# Patient Record
Sex: MALE | Race: WHITE | Employment: FULL TIME | URBAN - METROPOLITAN AREA
[De-identification: names, ages, dates, MRNs, and addresses within clinical notes are randomized per-mention and may not be internally consistent; named-entity substitution may affect disease eponyms.]

---

## 2020-02-10 ENCOUNTER — APPOINTMENT (OUTPATIENT)
Dept: GENERAL RADIOLOGY | Age: 50
End: 2020-02-10
Payer: COMMERCIAL

## 2020-02-10 ENCOUNTER — HOSPITAL ENCOUNTER (EMERGENCY)
Age: 50
Discharge: HOME | End: 2020-02-10
Attending: EMERGENCY MEDICINE
Payer: COMMERCIAL

## 2020-02-10 VITALS
DIASTOLIC BLOOD PRESSURE: 78 MMHG | BODY MASS INDEX: 25.77 KG/M2 | TEMPERATURE: 98 F | WEIGHT: 180 LBS | HEIGHT: 70 IN | OXYGEN SATURATION: 96 % | HEART RATE: 84 BPM | SYSTOLIC BLOOD PRESSURE: 128 MMHG | RESPIRATION RATE: 21 BRPM

## 2020-02-10 LAB
ALBUMIN SERPL-MCNC: 4.1 G/DL (ref 3.4–5)
ALP BLD-CCNC: 93 U/L (ref 40–129)
ALT SERPL-CCNC: 14 U/L (ref 10–40)
ANION GAP SERPL CALCULATED.3IONS-SCNC: 13 MMOL/L (ref 3–16)
AST SERPL-CCNC: 25 U/L (ref 15–37)
BASOPHILS ABSOLUTE: 0 K/UL (ref 0–0.2)
BASOPHILS RELATIVE PERCENT: 1 %
BILIRUB SERPL-MCNC: 0.9 MG/DL (ref 0–1)
BILIRUBIN DIRECT: <0.2 MG/DL (ref 0–0.3)
BILIRUBIN, INDIRECT: NORMAL MG/DL (ref 0–1)
BUN BLDV-MCNC: 12 MG/DL (ref 7–20)
CALCIUM SERPL-MCNC: 8.8 MG/DL (ref 8.3–10.6)
CHLORIDE BLD-SCNC: 103 MMOL/L (ref 99–110)
CO2: 21 MMOL/L (ref 21–32)
CREAT SERPL-MCNC: 1.1 MG/DL (ref 0.9–1.3)
D DIMER: 218 NG/ML DDU (ref 0–229)
EKG ATRIAL RATE: 85 BPM
EKG DIAGNOSIS: NORMAL
EKG P AXIS: 77 DEGREES
EKG P-R INTERVAL: 108 MS
EKG Q-T INTERVAL: 360 MS
EKG QRS DURATION: 96 MS
EKG QTC CALCULATION (BAZETT): 428 MS
EKG R AXIS: 78 DEGREES
EKG T AXIS: 73 DEGREES
EKG VENTRICULAR RATE: 85 BPM
EOSINOPHILS ABSOLUTE: 0 K/UL (ref 0–0.6)
EOSINOPHILS RELATIVE PERCENT: 0.4 %
GFR AFRICAN AMERICAN: >60
GFR NON-AFRICAN AMERICAN: >60
GLUCOSE BLD-MCNC: 109 MG/DL (ref 70–99)
HCT VFR BLD CALC: 48.3 % (ref 40.5–52.5)
HEMATOLOGY PATH CONSULT: YES
HEMOGLOBIN: 17 G/DL (ref 13.5–17.5)
LIPASE: 19 U/L (ref 13–60)
LYMPHOCYTES ABSOLUTE: 0.8 K/UL (ref 1–5.1)
LYMPHOCYTES RELATIVE PERCENT: 44.2 %
MCH RBC QN AUTO: 31.6 PG (ref 26–34)
MCHC RBC AUTO-ENTMCNC: 35.2 G/DL (ref 31–36)
MCV RBC AUTO: 89.7 FL (ref 80–100)
MONOCYTES ABSOLUTE: 0.3 K/UL (ref 0–1.3)
MONOCYTES RELATIVE PERCENT: 17.8 %
NEUTROPHILS ABSOLUTE: 0.7 K/UL (ref 1.7–7.7)
NEUTROPHILS RELATIVE PERCENT: 36.6 %
PDW BLD-RTO: 13.1 % (ref 12.4–15.4)
PLATELET # BLD: 151 K/UL (ref 135–450)
PMV BLD AUTO: 7.5 FL (ref 5–10.5)
POTASSIUM SERPL-SCNC: 3.7 MMOL/L (ref 3.5–5.1)
PRO-BNP: 19 PG/ML (ref 0–124)
RAPID INFLUENZA  B AGN: POSITIVE
RAPID INFLUENZA A AGN: NEGATIVE
RBC # BLD: 5.38 M/UL (ref 4.2–5.9)
SLIDE REVIEW: ABNORMAL
SODIUM BLD-SCNC: 137 MMOL/L (ref 136–145)
TOTAL PROTEIN: 7.4 G/DL (ref 6.4–8.2)
TROPONIN: <0.01 NG/ML
WBC # BLD: 1.9 K/UL (ref 4–11)

## 2020-02-10 PROCEDURE — 93005 ELECTROCARDIOGRAM TRACING: CPT | Performed by: EMERGENCY MEDICINE

## 2020-02-10 PROCEDURE — 83880 ASSAY OF NATRIURETIC PEPTIDE: CPT

## 2020-02-10 PROCEDURE — 83690 ASSAY OF LIPASE: CPT

## 2020-02-10 PROCEDURE — 93010 ELECTROCARDIOGRAM REPORT: CPT | Performed by: INTERNAL MEDICINE

## 2020-02-10 PROCEDURE — 85025 COMPLETE CBC W/AUTO DIFF WBC: CPT

## 2020-02-10 PROCEDURE — 2580000003 HC RX 258: Performed by: PHYSICIAN ASSISTANT

## 2020-02-10 PROCEDURE — 84484 ASSAY OF TROPONIN QUANT: CPT

## 2020-02-10 PROCEDURE — 85379 FIBRIN DEGRADATION QUANT: CPT

## 2020-02-10 PROCEDURE — 87804 INFLUENZA ASSAY W/OPTIC: CPT

## 2020-02-10 PROCEDURE — 99285 EMERGENCY DEPT VISIT HI MDM: CPT

## 2020-02-10 PROCEDURE — 80048 BASIC METABOLIC PNL TOTAL CA: CPT

## 2020-02-10 PROCEDURE — 71046 X-RAY EXAM CHEST 2 VIEWS: CPT

## 2020-02-10 PROCEDURE — 80076 HEPATIC FUNCTION PANEL: CPT

## 2020-02-10 RX ORDER — DEXTROMETHORPHAN HYDROBROMIDE AND PROMETHAZINE HYDROCHLORIDE 15; 6.25 MG/5ML; MG/5ML
5 SYRUP ORAL 4 TIMES DAILY PRN
Qty: 100 ML | Refills: 0 | Status: SHIPPED | OUTPATIENT
Start: 2020-02-10 | End: 2020-02-17

## 2020-02-10 RX ORDER — 0.9 % SODIUM CHLORIDE 0.9 %
1000 INTRAVENOUS SOLUTION INTRAVENOUS ONCE
Status: COMPLETED | OUTPATIENT
Start: 2020-02-10 | End: 2020-02-10

## 2020-02-10 RX ORDER — OSELTAMIVIR PHOSPHATE 75 MG/1
75 CAPSULE ORAL 2 TIMES DAILY
Qty: 10 CAPSULE | Refills: 0 | Status: SHIPPED | OUTPATIENT
Start: 2020-02-10 | End: 2020-02-15

## 2020-02-10 RX ORDER — IBUPROFEN 200 MG
600 TABLET ORAL EVERY 8 HOURS PRN
Qty: 60 TABLET | Refills: 0 | Status: SHIPPED | OUTPATIENT
Start: 2020-02-10 | End: 2021-12-24

## 2020-02-10 RX ADMIN — SODIUM CHLORIDE 1000 ML: 9 INJECTION, SOLUTION INTRAVENOUS at 14:38

## 2020-02-10 ASSESSMENT — PAIN DESCRIPTION - LOCATION: LOCATION: CHEST

## 2020-02-10 ASSESSMENT — ENCOUNTER SYMPTOMS
COUGH: 0
SHORTNESS OF BREATH: 0
ABDOMINAL PAIN: 0
DIARRHEA: 0
NAUSEA: 0
VOMITING: 0
RHINORRHEA: 0

## 2020-02-10 ASSESSMENT — PAIN SCALES - GENERAL: PAINLEVEL_OUTOF10: 5

## 2020-02-10 ASSESSMENT — PAIN DESCRIPTION - PAIN TYPE: TYPE: ACUTE PAIN

## 2020-02-10 NOTE — ED NOTES
Bed: 01  Expected date:   Expected time:   Means of arrival: Walk In  Comments:     Erica Cazares RN  02/10/20 1075

## 2020-02-10 NOTE — ED PROVIDER NOTES
I independently performed a history and physical on Florentin Garcia. All diagnostic, treatment, and disposition decisions were made by myself in conjunction with the advanced practice provider. I have participated in the medical decision making and directed the treatment plan and disposition of the patient. For further details of [facility] emergency department encounter, please see the advanced practice provider's documentation. CHIEF COMPLAINT  Chief Complaint   Patient presents with    Chest Pain     pt has had fever, body aches since Thurs. cp that began yesterday, feels like he is gonna pass out     Briefly, Florentin Garcia is a 51 y.o. male  who presents to the ED complaining of fevers body aches chills and malaise. He has felt a little lightheaded. He has some chest pain with coughing. He had symptoms onset on Thursday. No history of immunosuppression. He had tactile/subjective but no objective fevers. Cough is been nonproductive. FOCUSED PHYSICAL EXAMINATION  /78   Pulse 84   Temp 98 °F (36.7 °C) (Oral)   Resp 21   Ht 5' 10\" (1.778 m)   Wt 180 lb (81.6 kg)   SpO2 96%   BMI 25.83 kg/m²    Focused physical examination notable for no acute distress, well-appearing, well-nourished, normal speech and mentation without obvious facial droop, no obvious rash. No obvious cranial nerve deficits on my initial exam.  Regular rate and rhythm. Mild scattered rhonchi, overall no wheezes or rales.     The 12 lead EKG was interpreted by me as follows:  Rate: normal with a rate of 85  Rhythm: sinus  Axis: normal  Intervals: narrow QRS, normal QTc  ST segments: no ST elevations or depressions  T waves: no abnormal inversions  Non-specific T wave changes: present  Prior EKG comparison: No prior is currently available for comparison    MDM:  Diagnostic considerations included pulmonary embolism, COPD/asthma, pneumonia, viral URI, nasal congestion, bacterial sinusitis, viral/strep pharyngitis, otitis media, otitis externa    ED course was notable for influenza B with neutropenia, which could be secondary to his infection. His vitals are otherwise reassuring and he is not hypoxic or in respiratory distress. Given the neutropenia despite being outside of the Tamiflu window we did discuss this and he will be started on this antiviral medicine as well as primary care follow-up in the next 2 days for recheck CBC and chest x-ray is clear. No evidence of bacterial superinfection. Low threshold to return to the ED for new or worsening symptoms extensively discussed considering the neutropenia. During the patient's ED course, the patient was given:  Medications   0.9 % sodium chloride bolus (0 mLs Intravenous Stopped 2/10/20 1602)        CLINICAL IMPRESSION  1. Influenza B    2. Lightheadedness    3. Neutropenia, unspecified type ([address]        DISPOSITION  Laddavi Black was discharged to home in stable condition. I have discussed the findings of today's workup with the patient and addressed the patient's questions and concerns. Important warning signs as well as new or worsening symptoms which would necessitate immediate return to the ED were discussed. The plan is to discharge from the ED at this time, and the patient is in stable condition. The patient acknowledged understanding is agreeable with this plan. Patient was given scripts for the following medications. I counseled patient how to take these medications.    New Prescriptions    IBUPROFEN (ADVIL) 200 MG TABLET    Take 3 tablets by mouth every 8 hours as needed for Pain    OSELTAMIVIR (TAMIFLU) 75 MG CAPSULE    Take 1 capsule by mouth 2 times daily for 5 days    PROMETHAZINE-DEXTROMETHORPHAN (PROMETHAZINE-DM) 6.25-15 MG/5ML SYRUP    Take 5 mLs by mouth 4 times daily as needed for Cough     Follow-up with:  Juan Diego Negrete [facility] [address] [address]  [phone]    Schedule an appointment as soon as possible for a visit in 2

## 2020-02-10 NOTE — ED NOTES
Pt alert and oriented, complaints of chest pain, chills, and body aches, heart rate regular, S1, S2 heard. Cap refill less than three seconds, radial pulse 2+, no signs of edema or JVD and lungs sounds clear. Patient had panic labs in waiting room. Patient is stable at this time. IV established. SARBJIT Reaves at bedside for orders    Family at bedside, agreeable to plan of care, denies needs at this time. Will continue to monitor.       Tonia Lanza RN  02/10/20 3954

## 2020-02-10 NOTE — ED PROVIDER NOTES
addressed in the HPI. REVIEW OF SYSTEMS    (2-9 systems for level 4, 10 or more for level 5)     Review of Systems   Constitutional: Negative for activity change, appetite change, chills and fever. HENT: Positive for congestion. Negative for rhinorrhea. Respiratory: Negative for cough and shortness of breath. Cardiovascular: Negative for chest pain. Gastrointestinal: Negative for abdominal pain, diarrhea, nausea and vomiting. Genitourinary: Negative for difficulty urinating, dysuria and hematuria. Musculoskeletal: Positive for myalgias. Neurological: Positive for light-headedness. Negative for dizziness, weakness and headaches. Positives and Pertinent negatives as per HPI. Except as noted above in the ROS, all other systems were reviewed and negative. PAST MEDICAL HISTORY   History reviewed. No pertinent past medical history. SURGICAL HISTORY   History reviewed. No pertinent surgical history. Bhupinder Khan [address]       Discharge Medication List as of 2/10/2020  4:55 PM            ALLERGIES     Iodine    FAMILYHISTORY     History reviewed. No pertinent family history. SOCIAL HISTORY       Social History     Tobacco Use    Smoking status: Former Smoker     Types: Cigarettes     Last attempt to quit: 2010     Years since quittin.5   Substance Use Topics    Alcohol use: Yes     Comment: social    Drug use: No       SCREENINGS             PHYSICAL EXAM    (up to 7 for level 4, 8 or more for level 5)     ED Triage Vitals [02/10/20 1215]   BP Temp Temp Source Pulse Resp SpO2 Height Weight   125/83 98 °F (36.7 °C) Oral 82 18 98 % 5' 10\" (1.778 m) 180 lb (81.6 kg)       Physical Exam  Vitals signs and nursing note reviewed. Constitutional:       Appearance: He is well-developed. He is not diaphoretic. HENT:      Head: Normocephalic and atraumatic.       Right Ear: External ear normal.      Left Ear: External ear normal.      Nose: Nose normal.   Eyes:      General: Right eye: No discharge. Left eye: No discharge. Neck:      Musculoskeletal: Normal range of motion and neck supple. Trachea: No tracheal deviation. Cardiovascular:      Rate and Rhythm: Normal rate and regular rhythm. Heart sounds: No murmur. Pulmonary:      Effort: Pulmonary effort is normal. No respiratory distress. Breath sounds: Normal breath sounds. No wheezing or rales. Abdominal:      General: Bowel sounds are normal. There is no distension. Tenderness: There is no abdominal tenderness. There is no guarding. Musculoskeletal: Normal range of motion. Skin:     General: Skin is warm and dry. Neurological:      Mental Status: He is alert and oriented to person, place, and time.    Psychiatric:         Behavior: Behavior normal.         DIAGNOSTIC RESULTS   LABS:    Labs Reviewed   RAPID INFLUENZA A/B ANTIGENS - Abnormal; Notable for the following components:       Result Value    Rapid Influenza B Ag POSITIVE (*)     All other components within normal limits    Narrative:     Performed at:  [facility] [address], [place], [facility] [employer]   Phone [phone]   BASIC METABOLIC PANEL - Abnormal; Notable for the following components:    Glucose 109 (*)     All other components within normal limits    Narrative:     Performed at:  [facility] [address], [place], [address] [place]   Phone [phone]   CBC WITH AUTO DIFFERENTIAL - Abnormal; Notable for the following components:    WBC 1.9 (*)     Neutrophils Absolute 0.7 (*)     Lymphocytes Absolute 0.8 (*)     All other components within normal limits    Narrative:     Alfonzo Maradiaga  [facility] tel. [phone],  Coag results called to and read back by RUTH Salamanca, 02/10/2020 12:57,  by [facility]  Performed at:  [facility] [address], [place], [address]   Phone [phone]   TROPONIN    Narrative:     Performed at:  [facility]  [address], [address]   Phone [phone]   HEPATIC FUNCTION PANEL    Narrative:     Performed at:  [facility]  [address], [address]   Phone [phone]   LIPASE    Narrative:     Performed at:  [facility]  [address],  [place], [address]   Phone [phone] PEPTIDE    Narrative:     Performed at:  [facility]  [address],  [place], [address]   Phone [phone]   D-DIMER, QUANTITATIVE    Narrative:     Performed at:  [facility] [address], [address]   Phone [phone]   URINE RT REFLEX TO CULTURE       All other labs were within normal range or not returned as of this dictation. EKG: All EKG's are interpreted by the Emergency Department Physician in the absence of a cardiologist.  Please see their note for interpretation of EKG. RADIOLOGY:   Non-plain film images such as CT, Ultrasound and MRI are read by the radiologist. Plain radiographic images are visualized and preliminarily interpreted by the ED Provider with the below findings:        Interpretation per the Radiologist below, if available at the time of this note:    XR CHEST STANDARD (2 VW)   Final Result   1. No acute abnormality. Xr Chest Standard (2 Vw)    Result Date: 2/10/2020  EXAMINATION: TWO XRAY VIEWS OF THE CHEST 2/10/2020 1:20 pm COMPARISON: 02/29/2016 HISTORY: ORDERING SYSTEM PROVIDED HISTORY: cp TECHNOLOGIST PROVIDED HISTORY: Reason for exam:->cp Reason for Exam: Chest Pain (pt has had fever, body aches since Thurs. cp that began yesterday, feels like he is gonna pass out Acuity: Acute Type of Exam: Initial FINDINGS: The lungs are clear. The cardiac silhouette is within normal limits. There is no pneumothorax or pleural effusion.      1.  No acute abnormality. PROCEDURES   Unless otherwise noted below, none     Procedures    CRITICAL CARE TIME   N/A    CONSULTS:  None      EMERGENCY DEPARTMENT COURSE and DIFFERENTIAL DIAGNOSIS/MDM:   Vitals:    Vitals:    02/10/20 1215 02/10/20 1430 02/10/20 1433   BP: 125/83  128/78   Pulse: 82 84    Resp: 18 21    Temp: 98 °F (36.7 °C)     TempSrc: Oral     SpO2: 98% 96%    Weight: 180 lb (81.6 kg)     Height: 5' 10\" (1.778 m)         Patient was given the following medications:  Medications   0.9 % sodium chloride bolus (0 mLs Intravenous Stopped 2/10/20 1602)       The patient  presents to the emergency department today for evaluation for chest pain, body aches, nasal congestion, and fevers. The patient states that he started to have the body aches, lightheadedness, congestion and tactile fevers and he states that this has been Thursday. The patient is noted to be afebrile when he arrives to the ED. The patient states that he has not really had much of a cough. The patient states that since yesterday he has been experiencing intermittent substernal chest pain, he denies any known alleviating or aggravating factors. He is rating his pain as a 5/10. He states his pain is dull. And is nonradiating. The patient denies feeling diaphoretic, dizzy, lightheaded or nauseous with the pain. The patient denies any history of hypertension, diabetes or hyperlipidemia. No family history of any cardiac events. Non-smoker. The patient denies any recent travels, immobilizations or surgeries. No history of DVT or PE. No lower leg pain or swelling. He has no abdominal pain. He has no nausea, vomiting or diarrhea. He states that overall he is feeling a little better, but continued to have symptoms,which prompts his visit to the ED     Physical exam is unremarkable. EKG documented by my attending, please see his note for further details. CBC shows a leukopenia and neutropenia which is new.   His BMP is unremarkable hepatic function panel. Lipase is normal.  Troponin negative. D-dimer negative. BNP is normal.  X-ray shows no acute process    Patient was given fluids in the ED, and is overall feeling much better. I feel that his symptoms today are likely due to the flu as he is positive for influenza B. Although he is outside the window for Tamiflu he does have neutropenia and therefore this will be prescribed. The patient is otherwise stable for discharge and I feel that he can be managed as an outpatient. He is to obtain follow-up within 2 to 3 days for reevaluation. He is to return to the ED for any new or worsening symptoms. Patient voiced understanding and is agreeable plan. Stable for discharge. My suspicion is low at this time for ACS, pneumonia, pleural effusion, pneumothorax, myocarditis, pericarditis, cardiac tamponade, life-threatening arrhythmia, TAA, acute failure, respiratory distress, acute dissection or other emergent etiology at this time. FINAL IMPRESSION      1. Influenza B    2. Lightheadedness    3.  Neutropenia, unspecified type [facility])          DISPOSITION/PLAN   DISPOSITION Decision To Discharge 02/10/2020 04:26:31 PM      PATIENT REFERREDTO:  Pratibha Ayon  [address] [facility]  [phone]    Schedule an appointment as soon as possible for a visit in 2 days      [facility] Emergency Department  [address] [facility]  [phone]    As needed, If symptoms worsen      DISCHARGE MEDICATIONS:  Discharge Medication List as of 2/10/2020  4:55 PM      START taking these medications    Details   oseltamivir (TAMIFLU) 75 MG capsule Take 1 capsule by mouth 2 times daily for 5 days, Disp-10 capsule, R-0Print      promethazine-dextromethorphan (PROMETHAZINE-DM) 6.25-15 MG/5ML syrup Take 5 mLs by mouth 4 times daily as needed for Cough, Disp-100 mL, R-0Print      ibuprofen (ADVIL) 200 MG tablet Take 3 tablets by mouth every 8 hours as needed for

## 2020-02-10 NOTE — LETTER
[facility] Emergency Department  [address] [address]  Phone: [phone]               February 10, 2020    Patient: Brianna Huber   YOB: 1970   Date of Visit: 2/10/2020       To Whom It May Concern:    Brianna Huber was seen and treated in our emergency department on 2/10/2020. He may return to work on 02/13/20.       Sincerely,       Javier Keen RN         Signature:__________________________________

## 2020-02-11 LAB — HEMATOLOGY PATH CONSULT: NORMAL

## 2021-12-24 ENCOUNTER — APPOINTMENT (OUTPATIENT)
Dept: GENERAL RADIOLOGY | Age: 51
End: 2021-12-24
Payer: COMMERCIAL

## 2021-12-24 ENCOUNTER — HOSPITAL ENCOUNTER (EMERGENCY)
Age: 51
Discharge: HOME | End: 2021-12-24
Payer: COMMERCIAL

## 2021-12-24 VITALS
TEMPERATURE: 97.2 F | BODY MASS INDEX: 25.56 KG/M2 | HEIGHT: 70 IN | HEART RATE: 62 BPM | WEIGHT: 178.5 LBS | SYSTOLIC BLOOD PRESSURE: 118 MMHG | OXYGEN SATURATION: 97 % | RESPIRATION RATE: 16 BRPM | DIASTOLIC BLOOD PRESSURE: 82 MMHG

## 2021-12-24 DIAGNOSIS — S62.632B OPEN DISPLACED FRACTURE OF DISTAL PHALANX OF RIGHT MIDDLE FINGER, INITIAL ENCOUNTER: Primary | ICD-10-CM

## 2021-12-24 PROCEDURE — 6360000002 HC RX W HCPCS: Performed by: PHYSICIAN ASSISTANT

## 2021-12-24 PROCEDURE — 73140 X-RAY EXAM OF FINGER(S): CPT

## 2021-12-24 PROCEDURE — 96372 THER/PROPH/DIAG INJ SC/IM: CPT

## 2021-12-24 PROCEDURE — 90715 TDAP VACCINE 7 YRS/> IM: CPT

## 2021-12-24 PROCEDURE — 12001 RPR S/N/AX/GEN/TRNK 2.5CM/<: CPT

## 2021-12-24 PROCEDURE — 90471 IMMUNIZATION ADMIN: CPT

## 2021-12-24 PROCEDURE — 6360000002 HC RX W HCPCS

## 2021-12-24 PROCEDURE — 99283 EMERGENCY DEPT VISIT LOW MDM: CPT

## 2021-12-24 RX ORDER — IBUPROFEN 800 MG/1
800 TABLET ORAL EVERY 8 HOURS PRN
Qty: 20 TABLET | Refills: 0 | Status: SHIPPED | OUTPATIENT
Start: 2021-12-24 | End: 2022-01-03

## 2021-12-24 RX ORDER — HYDROCODONE BITARTRATE AND ACETAMINOPHEN 5; 325 MG/1; MG/1
1 TABLET ORAL EVERY 6 HOURS PRN
Qty: 10 TABLET | Refills: 0 | Status: SHIPPED | OUTPATIENT
Start: 2021-12-24 | End: 2021-12-27

## 2021-12-24 RX ORDER — CEFAZOLIN SODIUM 1 G/3ML
1000 INJECTION, POWDER, FOR SOLUTION INTRAMUSCULAR; INTRAVENOUS ONCE
Status: COMPLETED | OUTPATIENT
Start: 2021-12-24 | End: 2021-12-24

## 2021-12-24 RX ORDER — CEPHALEXIN 500 MG/1
500 CAPSULE ORAL 4 TIMES DAILY
Qty: 28 CAPSULE | Refills: 0 | Status: SHIPPED | OUTPATIENT
Start: 2021-12-24 | End: 2021-12-31

## 2021-12-24 RX ADMIN — TETANUS TOXOID, REDUCED DIPHTHERIA TOXOID AND ACELLULAR PERTUSSIS VACCINE, ADSORBED 0.5 ML: 5; 2.5; 8; 8; 2.5 SUSPENSION INTRAMUSCULAR at 08:58

## 2021-12-24 RX ADMIN — CEFAZOLIN SODIUM 1000 MG: 1 INJECTION, POWDER, FOR SOLUTION INTRAMUSCULAR; INTRAVENOUS at 08:58

## 2021-12-24 ASSESSMENT — PAIN SCALES - GENERAL: PAINLEVEL_OUTOF10: 5

## 2021-12-24 NOTE — ED PROVIDER NOTES
**ADVANCED PRACTICE PROVIDER, I HAVE EVALUATED THIS PATIENT**        [address] ENCOUNTER      Pt Name: Zach Mail  RNQ:[id number]  Nando 1970  Date of evaluation: 12/24/2021  Provider: Meagan Khan PA-C      Chief Complaint:    Chief Complaint   Patient presents with    Trauma     to right 3rd finger in car door at 0200         Nursing Notes, Past Medical Hx, Past Surgical Hx, Social Hx, Allergies, and Family Hx were all reviewed and agreed with or any disagreements were addressed in the HPI.    HPI: (Location, Duration, Timing, Severity, Quality, Assoc Sx, Context, Modifying factors)    Chief Complaint of third finger pain    This is a  49 y.o. male who presents to the emergency department, patient states that it was a door that came down onto the top of his finger, and he could not get his finger out of the way and he smashed it. This happened at 2:00 in the morning. He admits to pain and disfigurement. He wrapped the area up, he states his pain level is 5/10, less than last night. States his tetanus is not up-to-date. Denies any other injury to his hand. PastMedical/Surgical History:  History reviewed. No pertinent past medical history. History reviewed. No pertinent surgical history. Medications:  Discharge Medication List as of 12/24/2021  8:59 AM            Review of Systems:  (2-9 systems needed)  Review of Systems    \"Positives and Pertinent negatives as per HPI\"    Physical Exam:  Physical Exam  Vitals and nursing note reviewed. Constitutional:       Appearance: Normal appearance. He is well-developed. He is not ill-appearing or diaphoretic. HENT:      Head: Normocephalic and atraumatic. Right Ear: External ear normal.      Left Ear: External ear normal.      Nose: Nose normal.   Eyes:      General:         Right eye: No discharge. Left eye: No discharge.    Pulmonary:      Effort: Pulmonary effort is normal. No respiratory distress. Breath sounds: No stridor. Musculoskeletal:      Left hand: Swelling, deformity, laceration, tenderness and bony tenderness present. Decreased range of motion. Hands:       Cervical back: Normal range of motion and neck supple. Skin:     General: Skin is warm and dry. Coloration: Skin is not pale. Neurological:      General: No focal deficit present. Mental Status: He is alert and oriented to person, place, and time. Psychiatric:         Mood and Affect: Mood normal.         Behavior: Behavior normal.         MEDICAL DECISION MAKING    Vitals:    Vitals:    12/24/21 0738   BP: 118/82   Pulse: 62   Resp: 16   Temp: 97.2 °F (36.2 °C)   TempSrc: Temporal   SpO2: 97%   Weight: 178 lb 8 oz (81 kg)   Height: 5' 10\" (1.778 m)       LABS:Labs Reviewed - No data to display     Remainder of labs reviewed and were negative at this time or not returned at the time of this note. RADIOLOGY:   Non-plain film images such as CT, Ultrasound and MRI are read by the radiologist. Libra Tafoya PA-C have directly visualized the radiologic plain film image(s) with the below findings:      Interpretation per the Radiologist below, if available at the time of this note:    XR FINGER RIGHT (MIN 2 VIEWS)   Final Result   Comminuted distal 3rd phalangeal tuft fracture with dorsal displacement and   overlying soft tissue injury. XR FINGER RIGHT (MIN 2 VIEWS)    Result Date: 12/24/2021  EXAMINATION: THREE XRAY VIEWS OF THE RIGHT FINGERS 12/24/2021 7:42 am COMPARISON: None. HISTORY: ORDERING SYSTEM PROVIDED HISTORY: injury. 3rd finger TECHNOLOGIST PROVIDED HISTORY: Reason for exam:->injury. 3rd finger Reason for Exam: Trauma (to right 3rd finger in car door at 0200) FINDINGS: Crush injury of the 3rd distal phalanx through the tuft is evident with soft tissue injury and surrounding bandage.   There is comminuted fracture involving the tuft extending through the base of the tuft primarily. No additional fracture. No dislocation. On the lateral projection, tuft fragment is displaced dorsally. Comminuted distal 3rd phalangeal tuft fracture with dorsal displacement and overlying soft tissue injury. MEDICAL DECISION MAKING / ED COURSE:      PROCEDURES:   Lac Repair    Date/Time: 12/24/2021 5:51 PM  Performed by: Isabell Recio PA-C  Authorized by: Isabell Recio PA-C     Consent:     Consent obtained:  Verbal    Consent given by:  Patient    Risks discussed:  Pain  Anesthesia (see MAR for exact dosages): Anesthesia method:  Nerve block    Block needle gauge:  27 G    Block anesthetic:  Bupivacaine 0.5% w/o epi    Block injection procedure:  Anatomic landmarks identified, introduced needle, incremental injection and anatomic landmarks palpated    Block outcome:  Anesthesia achieved  Laceration details:     Location:  Finger    Finger location:  R long finger    Length (cm):  2    Depth (mm):  1  Repair type:     Repair type:   Intermediate  Pre-procedure details:     Preparation:  Patient was prepped and draped in usual sterile fashion  Exploration:     Hemostasis achieved with:  Direct pressure    Wound exploration: entire depth of wound probed and visualized      Wound extent: no fascia violation noted, no foreign bodies/material noted, no muscle damage noted, no nerve damage noted, no tendon damage noted and no vascular damage noted    Treatment:     Area cleansed with:  Saline and Hibiclens    Amount of cleaning:  Standard    Irrigation solution:  Sterile saline    Irrigation volume:  1000    Irrigation method:  Tap    Visualized foreign bodies/material removed: no    Skin repair:     Repair method:  Sutures    Suture size:  4-0    Suture material:  Nylon    Suture technique:  Simple interrupted    Number of sutures:  2  Approximation:     Approximation:  Close  Post-procedure details:     Dressing:  Bulky dressing, splint for protection, non-adherent dressing and sterile dressing    Patient tolerance of procedure: Tolerated well, no immediate complications  Comments:      Fingernail was tucked back in an stone. None    Patient was given:  Medications   Tetanus-Diphth-Acell Pertussis (BOOSTRIX) injection 0.5 mL (0.5 mLs IntraMUSCular Given 12/24/21 0858)   ceFAZolin (ANCEF) injection 1,000 mg (1,000 mg IntraMUSCular Given 12/24/21 0858)       This is an open fracture, antibiotics and tetanus were given, patient will be given the name of the hand surgeon. The patient tolerated their visit well. I evaluated the patient. The physician was available for consultation as needed. The patient and / or the family were informed of the results of any tests, a time was given to answer questions, a plan was proposed and they agreed with plan. CLINICAL IMPRESSION:  1. Open displaced fracture of distal phalanx of right middle finger, initial encounter        DISPOSITION Decision To Discharge 12/24/2021 08:51:43 AM      PATIENT REFERRED TO:  Nilton Simon MD  [address] [place] [address], [address] [place] [address],[address]  [address] [place] [address]  [phone]    Call today  to be seen by this MD or one of their partners, For a recheck in 5-7 days      DISCHARGE MEDICATIONS:  Discharge Medication List as of 12/24/2021  8:59 AM      START taking these medications    Details   HYDROcodone-acetaminophen (NORCO) 5-325 MG per tablet Take 1 tablet by mouth every 6 hours as needed for Pain for up to 3 days. , Disp-10 tablet, R-0Print      cephALEXin (KEFLEX) 500 MG capsule Take 1 capsule by mouth 4 times daily for 7 days, Disp-28 capsule, R-0Print             DISCONTINUED MEDICATIONS:  Discharge Medication List as of 12/24/2021  8:59 AM                 (Please note the MDM and HPI sections of this note were completed with a voice recognition program.  Efforts were made to edit the dictations but occasionally words are mis-transcribed.)    Electronically signed, Abhi Ledezma PA-C,          Tonia Lemnos Yamila Doyle, [place]  12/24/21 4017

## 2021-12-27 ENCOUNTER — TELEPHONE (OUTPATIENT)
Dept: ORTHOPEDIC SURGERY | Age: 51
End: 2021-12-27

## 2022-01-07 ENCOUNTER — OFFICE VISIT (OUTPATIENT)
Dept: ORTHOPEDIC SURGERY | Age: 52
End: 2022-01-07
Payer: COMMERCIAL

## 2022-01-07 VITALS — BODY MASS INDEX: 25.48 KG/M2 | RESPIRATION RATE: 15 BRPM | HEIGHT: 70 IN | WEIGHT: 178 LBS

## 2022-01-07 DIAGNOSIS — S69.91XA INJURY OF TIP OF FINGER OF RIGHT HAND, INITIAL ENCOUNTER: Primary | ICD-10-CM

## 2022-01-07 PROCEDURE — G8484 FLU IMMUNIZE NO ADMIN: HCPCS | Performed by: ORTHOPAEDIC SURGERY

## 2022-01-07 PROCEDURE — G8419 CALC BMI OUT NRM PARAM NOF/U: HCPCS | Performed by: ORTHOPAEDIC SURGERY

## 2022-01-07 PROCEDURE — 99204 OFFICE O/P NEW MOD 45 MIN: CPT | Performed by: ORTHOPAEDIC SURGERY

## 2022-01-07 PROCEDURE — 26750 TREAT FINGER FRACTURE EACH: CPT | Performed by: ORTHOPAEDIC SURGERY

## 2022-01-07 PROCEDURE — G8427 DOCREV CUR MEDS BY ELIG CLIN: HCPCS | Performed by: ORTHOPAEDIC SURGERY

## 2022-01-07 RX ORDER — HYDROCODONE BITARTRATE AND ACETAMINOPHEN 5; 325 MG/1; MG/1
1 TABLET ORAL EVERY 6 HOURS PRN
Qty: 10 TABLET | Refills: 0 | Status: SHIPPED | OUTPATIENT
Start: 2022-01-07 | End: 2022-01-14

## 2022-01-07 NOTE — Clinical Note
Dear  Parth Bender,    Thank you very much for your referral or . Jacob Hendricks to me for evaluation and treatment of his Hand & Wrist condition. I appreciate your confidence in me and thank you for allowing me the opportunity to care for your patients. If I can be of any further assistance to you on this or any other patient, please do not hesitate to contact me. Sincerely,    Silvio Nava.  Stewart Wright MD

## 2022-01-07 NOTE — PROGRESS NOTES
Mr. Luh Carlin is a 49 y.o. right handed man  who is seen today in Hand Surgical Consultation at the request of Armando Davis. He is seen today regarding an injury occurring on December 24th, 2021. He reports injuring his right Middle Finger, having slammed it in a door . At the time of injury, there was clear dislocation or malposition of the finger. He was seen for Emergency evaluation elsewhere, radiographs were obtained & he has been immobilized. By report, his skin injury was repaired after the wound was thoroughly cleansed. The nail structures were involved in the injury and did require repair at the time. He reports moderate pain located in the Distal aspect of the Middle Finger, no tenderness of the wrist or elbow. He notes today, moderate neurologic symptoms in the Middle Finger tip. Symptoms show no change over time. I have today reviewed with Luh Carlin the clinically relevant, past medical history, medications, allergies,  family history, social history, and Review Of Systems & I have documented any details relevant to today's presenting complaints in my history above. Mr. Perry Khan self-reported past medical history, medications, allergies,  family history, social history, and Review Of Systems have been scanned into the chart under the \"Media\" tab. Physical Exam:  Mr. Perry Khan most recent vitals:  Vitals  Resp: 15  Height: 5' 10\" (177.8 cm)  Weight: 178 lb (80.7 kg)    He is well nourished, oriented to person, place & time. He demonstrates appropriate mood and affect as well as normal gait and station. Skin: There is Crescentic 1.5cm laceration on the Dorsal right Middle Finger closest to the paronychial margin which has been sutured. The nail plate is present. No other digits show sign of skin injury bilaterally. Digital range of motion is limited by pain in the injured digit on the Right, normal on the Left.  FDS function is Intact, FDP function is Intact, common extensor function is Intact. Wrist range of motion is without significant limitation bilaterally. Sensation is subjectively decreased in the zone of injury, objectively present. All other digits demonstrate normal sensation bilaterally. Vascular examination reveals normal and good capillary refill bilaterally. There is moderate acute ecchymosis. Swelling is moderate in the Middle Finger, all other digits demonstrate no evidence of swelling bilaterally. There is no evidence of gross joint instability bilaterally. Muscular strength is clinically appropriate bilaterally. Maximal pain is elicited with palpation of the distal region of the Middle Finger about the Distal Phalanx . The base of the hand & wrist are not tender to palpation. There is a 10 degree angular deformity and no clinical evidence of  mal-rotation of the injured digit. Radiographic Evaluation:  Radiographs, taken From a Hospital location outside of my practice were Personally Reviewed & Interpreted by myself today (2 views of the right Middle Finger). They do demonstrate evidence of an acute fracture of the Distal Phalanx of the Middle Finger. There is moderate comminution, 10 degrees of angular deformity and no  mal-rotation. The distal fragment is dorsally displaced. There is not evidence of other injury or bony fracture. Impression:  Mr. Phillip Hinds has sustained recent right Middle Finger injury with associated fracture. He  presents requesting further treatment. Plan:    I  have discussed with Mr. Phillip Hinds the various treatment options for treatment of his right Middle Finger tip injury.   We discussed the options of Conservative management allowing the soft tissues and bony structures to heal as well as possible and the functional consequences thereof, and Surgical Management in the form of Irrigation and debridement of the wound and associated injuries, and the repair of injured structures as possible (including skin, bone, nail structures, etc.). He has elected to proceed conservatively, voicing an understanding of the other options available to him. I have explained the complications, limitations, expectations, alternatives, & risks of his chosen treatment. We discussed the possibility of residual symptoms as may be related to conservative treatment of fractures including the possibilities of: infection, poor healing of bone, skin, nail bed, and other structures, persistant deformity, persistant pain, persistent sensory loss, limitation of motion, future arthritic symptoms & the possible need for further treatment. He understood our discussion and was comfortable with his decision; he was provided with appropriate expectations. He is today fitted with a carefully applied, well padded & appropriately molded STACK SPLINT. He was given a Patient Instruction Sheet related to cast care. I have asked him to schedule a follow-up appointment for 3 weeks from now at which time we will obtain repeat radiographs of the injured finger out of splint/cast.    He is specifically instructed to contact the office between now & his scheduled appointment if he has concerns related to the underlying injury. He is welcome to call for an appointment sooner if he has any additional concerns or questions.

## 2023-09-05 ENCOUNTER — HOSPITAL ENCOUNTER (INPATIENT)
Age: 53
LOS: 2 days | Discharge: HOME | End: 2023-09-07
Attending: EMERGENCY MEDICINE | Admitting: STUDENT IN AN ORGANIZED HEALTH CARE EDUCATION/TRAINING PROGRAM
Payer: COMMERCIAL

## 2023-09-05 ENCOUNTER — APPOINTMENT (OUTPATIENT)
Dept: GENERAL RADIOLOGY | Age: 53
End: 2023-09-05
Payer: COMMERCIAL

## 2023-09-05 ENCOUNTER — APPOINTMENT (OUTPATIENT)
Dept: CT IMAGING | Age: 53
End: 2023-09-05
Payer: COMMERCIAL

## 2023-09-05 ENCOUNTER — APPOINTMENT (OUTPATIENT)
Dept: MRI IMAGING | Age: 53
End: 2023-09-05
Payer: COMMERCIAL

## 2023-09-05 DIAGNOSIS — E87.20 LACTIC ACIDOSIS: ICD-10-CM

## 2023-09-05 DIAGNOSIS — R56.9 SEIZURE (HCC): ICD-10-CM

## 2023-09-05 DIAGNOSIS — R41.82 ALTERED MENTAL STATUS, UNSPECIFIED ALTERED MENTAL STATUS TYPE: Primary | ICD-10-CM

## 2023-09-05 PROBLEM — N17.9 AKI (ACUTE KIDNEY INJURY) (HCC): Status: ACTIVE | Noted: 2023-09-05

## 2023-09-05 PROBLEM — G93.41 ACUTE METABOLIC ENCEPHALOPATHY: Status: ACTIVE | Noted: 2023-09-05

## 2023-09-05 LAB
ALBUMIN SERPL-MCNC: 4.1 G/DL (ref 3.4–5)
ALBUMIN SERPL-MCNC: 4.7 G/DL (ref 3.4–5)
ALBUMIN/GLOB SERPL: 1.4 {RATIO} (ref 1.1–2.2)
ALBUMIN/GLOB SERPL: 1.5 {RATIO} (ref 1.1–2.2)
ALP SERPL-CCNC: 102 U/L (ref 40–129)
ALP SERPL-CCNC: 115 U/L (ref 40–129)
ALT SERPL-CCNC: 14 U/L (ref 10–40)
ALT SERPL-CCNC: 20 U/L (ref 10–40)
AMMONIA PLAS-SCNC: 33 UMOL/L (ref 16–60)
AMPHETAMINES UR QL SCN>1000 NG/ML: ABNORMAL
ANION GAP SERPL CALCULATED.3IONS-SCNC: 12 MMOL/L (ref 3–16)
ANION GAP SERPL CALCULATED.3IONS-SCNC: 25 MMOL/L (ref 3–16)
AST SERPL-CCNC: 18 U/L (ref 15–37)
AST SERPL-CCNC: 25 U/L (ref 15–37)
BACTERIA URNS QL MICRO: NORMAL /HPF
BARBITURATES UR QL SCN>200 NG/ML: ABNORMAL
BASE EXCESS BLDV CALC-SCNC: -10.6 MMOL/L (ref -3–3)
BASE EXCESS BLDV CALC-SCNC: -4.3 MMOL/L (ref -3–3)
BASOPHILS # BLD: 0.1 K/UL (ref 0–0.2)
BASOPHILS NFR BLD: 0.9 %
BENZODIAZ UR QL SCN>200 NG/ML: ABNORMAL
BILIRUB SERPL-MCNC: 1.2 MG/DL (ref 0–1)
BILIRUB SERPL-MCNC: 1.6 MG/DL (ref 0–1)
BILIRUB UR QL STRIP.AUTO: NEGATIVE
BUN SERPL-MCNC: 11 MG/DL (ref 7–20)
BUN SERPL-MCNC: 9 MG/DL (ref 7–20)
CALCIUM SERPL-MCNC: 9.2 MG/DL (ref 8.3–10.6)
CALCIUM SERPL-MCNC: 9.8 MG/DL (ref 8.3–10.6)
CANNABINOIDS UR QL SCN>50 NG/ML: POSITIVE
CHLORIDE SERPL-SCNC: 107 MMOL/L (ref 99–110)
CHLORIDE SERPL-SCNC: 99 MMOL/L (ref 99–110)
CLARITY UR: CLEAR
CO2 BLDV-SCNC: 34 MMOL/L
CO2 BLDV-SCNC: 52 MMOL/L
CO2 SERPL-SCNC: 12 MMOL/L (ref 21–32)
CO2 SERPL-SCNC: 19 MMOL/L (ref 21–32)
COCAINE UR QL SCN: ABNORMAL
COHGB MFR BLDV: 3.3 % (ref 0–1.5)
COHGB MFR BLDV: 3.5 % (ref 0–1.5)
COLOR UR: YELLOW
CREAT SERPL-MCNC: 1 MG/DL (ref 0.9–1.3)
CREAT SERPL-MCNC: 1.4 MG/DL (ref 0.9–1.3)
DEPRECATED RDW RBC AUTO: 12.9 % (ref 12.4–15.4)
DO-HGB MFR BLDV: 6 %
DRUG SCREEN COMMENT UR-IMP: ABNORMAL
EKG ATRIAL RATE: 82 BPM
EKG DIAGNOSIS: NORMAL
EKG P AXIS: 68 DEGREES
EKG P-R INTERVAL: 118 MS
EKG Q-T INTERVAL: 412 MS
EKG QRS DURATION: 102 MS
EKG QTC CALCULATION (BAZETT): 481 MS
EKG R AXIS: 75 DEGREES
EKG T AXIS: 34 DEGREES
EKG VENTRICULAR RATE: 82 BPM
EOSINOPHIL # BLD: 0.3 K/UL (ref 0–0.6)
EOSINOPHIL NFR BLD: 3.3 %
EPI CELLS #/AREA URNS AUTO: 0 /HPF (ref 0–5)
ETHANOLAMINE SERPL-MCNC: NORMAL MG/DL (ref 0–0.08)
FENTANYL SCREEN, URINE: ABNORMAL
FOLATE SERPL-MCNC: 5.41 NG/ML (ref 4.78–24.2)
GFR SERPLBLD CREATININE-BSD FMLA CKD-EPI: >60 ML/MIN/{1.73_M2}
GFR SERPLBLD CREATININE-BSD FMLA CKD-EPI: >60 ML/MIN/{1.73_M2}
GLUCOSE SERPL-MCNC: 130 MG/DL (ref 70–99)
GLUCOSE SERPL-MCNC: 160 MG/DL (ref 70–99)
GLUCOSE UR STRIP.AUTO-MCNC: NEGATIVE MG/DL
HCO3 BLDV-SCNC: 14.3 MMOL/L (ref 23–29)
HCO3 BLDV-SCNC: 21.9 MMOL/L (ref 23–29)
HCT VFR BLD AUTO: 48.8 % (ref 40.5–52.5)
HGB BLD-MCNC: 16.6 G/DL (ref 13.5–17.5)
HGB UR QL STRIP.AUTO: ABNORMAL
HYALINE CASTS #/AREA URNS AUTO: 1 /LPF (ref 0–8)
KETONES UR STRIP.AUTO-MCNC: ABNORMAL MG/DL
LACTATE BLDV-SCNC: 0.9 MMOL/L (ref 0.4–2)
LACTATE BLDV-SCNC: 13 MMOL/L (ref 0.4–2)
LACTATE BLDV-SCNC: 2.2 MMOL/L (ref 0.4–2)
LEUKOCYTE ESTERASE UR QL STRIP.AUTO: NEGATIVE
LYMPHOCYTES # BLD: 3.7 K/UL (ref 1–5.1)
LYMPHOCYTES NFR BLD: 38.8 %
MCH RBC QN AUTO: 31.5 PG (ref 26–34)
MCHC RBC AUTO-ENTMCNC: 34 G/DL (ref 31–36)
MCV RBC AUTO: 92.8 FL (ref 80–100)
METHADONE UR QL SCN>300 NG/ML: ABNORMAL
METHGB MFR BLDV: 0.2 %
METHGB MFR BLDV: 0.4 %
MONOCYTES # BLD: 1 K/UL (ref 0–1.3)
MONOCYTES NFR BLD: 10 %
NEUTROPHILS # BLD: 4.5 K/UL (ref 1.7–7.7)
NEUTROPHILS NFR BLD: 47 %
NITRITE UR QL STRIP.AUTO: NEGATIVE
O2 CT VFR BLDV CALC: 20 VOL %
O2 CT VFR BLDV CALC: 23 VOL %
O2 THERAPY: ABNORMAL
O2 THERAPY: ABNORMAL
OPIATES UR QL SCN>300 NG/ML: ABNORMAL
OXYCODONE UR QL SCN: ABNORMAL
PCO2 BLDV: 29.4 MMHG (ref 40–50)
PCO2 BLDV: 43.4 MMHG (ref 40–50)
PCP UR QL SCN>25 NG/ML: ABNORMAL
PH BLDV: 7.3 [PH] (ref 7.35–7.45)
PH BLDV: 7.31 [PH] (ref 7.35–7.45)
PH UR STRIP.AUTO: 5.5 [PH] (ref 5–8)
PH UR STRIP: 5.5 [PH]
PLATELET # BLD AUTO: 368 K/UL (ref 135–450)
PLATELET BLD QL SMEAR: ADEQUATE
PMV BLD AUTO: 7.3 FL (ref 5–10.5)
PO2 BLDV: 166 MMHG (ref 25–40)
PO2 BLDV: 70.9 MMHG (ref 25–40)
POTASSIUM SERPL-SCNC: 3.8 MMOL/L (ref 3.5–5.1)
POTASSIUM SERPL-SCNC: 3.9 MMOL/L (ref 3.5–5.1)
PROT SERPL-MCNC: 7 G/DL (ref 6.4–8.2)
PROT SERPL-MCNC: 7.8 G/DL (ref 6.4–8.2)
PROT UR STRIP.AUTO-MCNC: ABNORMAL MG/DL
RBC # BLD AUTO: 5.26 M/UL (ref 4.2–5.9)
RBC CLUMPS #/AREA URNS AUTO: 0 /HPF (ref 0–4)
SAO2 % BLDV: 100 %
SAO2 % BLDV: 94 %
SLIDE REVIEW: NORMAL
SODIUM SERPL-SCNC: 136 MMOL/L (ref 136–145)
SODIUM SERPL-SCNC: 138 MMOL/L (ref 136–145)
SP GR UR STRIP.AUTO: 1.01 (ref 1–1.03)
TSH SERPL DL<=0.005 MIU/L-ACNC: 0.62 UIU/ML (ref 0.27–4.2)
UA COMPLETE W REFLEX CULTURE PNL UR: ABNORMAL
UA DIPSTICK W REFLEX MICRO PNL UR: YES
URN SPEC COLLECT METH UR: ABNORMAL
UROBILINOGEN UR STRIP-ACNC: 0.2 E.U./DL
VIT B12 SERPL-MCNC: 624 PG/ML (ref 211–911)
WBC # BLD AUTO: 9.6 K/UL (ref 4–11)
WBC #/AREA URNS AUTO: 0 /HPF (ref 0–5)

## 2023-09-05 PROCEDURE — 71045 X-RAY EXAM CHEST 1 VIEW: CPT

## 2023-09-05 PROCEDURE — 6370000000 HC RX 637 (ALT 250 FOR IP): Performed by: EMERGENCY MEDICINE

## 2023-09-05 PROCEDURE — 70450 CT HEAD/BRAIN W/O DYE: CPT

## 2023-09-05 PROCEDURE — 95819 EEG AWAKE AND ASLEEP: CPT

## 2023-09-05 PROCEDURE — 96374 THER/PROPH/DIAG INJ IV PUSH: CPT

## 2023-09-05 PROCEDURE — 2580000003 HC RX 258: Performed by: EMERGENCY MEDICINE

## 2023-09-05 PROCEDURE — 2580000003 HC RX 258: Performed by: STUDENT IN AN ORGANIZED HEALTH CARE EDUCATION/TRAINING PROGRAM

## 2023-09-05 PROCEDURE — 6370000000 HC RX 637 (ALT 250 FOR IP): Performed by: STUDENT IN AN ORGANIZED HEALTH CARE EDUCATION/TRAINING PROGRAM

## 2023-09-05 PROCEDURE — 93010 ELECTROCARDIOGRAM REPORT: CPT | Performed by: INTERNAL MEDICINE

## 2023-09-05 PROCEDURE — 82607 VITAMIN B-12: CPT

## 2023-09-05 PROCEDURE — 80053 COMPREHEN METABOLIC PANEL: CPT

## 2023-09-05 PROCEDURE — 92610 EVALUATE SWALLOWING FUNCTION: CPT

## 2023-09-05 PROCEDURE — 82803 BLOOD GASES ANY COMBINATION: CPT

## 2023-09-05 PROCEDURE — 96361 HYDRATE IV INFUSION ADD-ON: CPT

## 2023-09-05 PROCEDURE — 99223 1ST HOSP IP/OBS HIGH 75: CPT | Performed by: PSYCHIATRY & NEUROLOGY

## 2023-09-05 PROCEDURE — 83036 HEMOGLOBIN GLYCOSYLATED A1C: CPT

## 2023-09-05 PROCEDURE — APPSS45 APP SPLIT SHARED TIME 31-45 MINUTES

## 2023-09-05 PROCEDURE — 80307 DRUG TEST PRSMV CHEM ANLYZR: CPT

## 2023-09-05 PROCEDURE — 82140 ASSAY OF AMMONIA: CPT

## 2023-09-05 PROCEDURE — 92526 ORAL FUNCTION THERAPY: CPT

## 2023-09-05 PROCEDURE — 99285 EMERGENCY DEPT VISIT HI MDM: CPT

## 2023-09-05 PROCEDURE — 2060000000 HC ICU INTERMEDIATE R&B

## 2023-09-05 PROCEDURE — 93005 ELECTROCARDIOGRAM TRACING: CPT | Performed by: EMERGENCY MEDICINE

## 2023-09-05 PROCEDURE — 82077 ASSAY SPEC XCP UR&BREATH IA: CPT

## 2023-09-05 PROCEDURE — 83605 ASSAY OF LACTIC ACID: CPT

## 2023-09-05 PROCEDURE — 36415 COLL VENOUS BLD VENIPUNCTURE: CPT

## 2023-09-05 PROCEDURE — 85025 COMPLETE CBC W/AUTO DIFF WBC: CPT

## 2023-09-05 PROCEDURE — 1200000000 HC SEMI PRIVATE

## 2023-09-05 PROCEDURE — 6360000002 HC RX W HCPCS: Performed by: EMERGENCY MEDICINE

## 2023-09-05 PROCEDURE — APPNB30 APP NON BILLABLE TIME 0-30 MINS

## 2023-09-05 PROCEDURE — 82746 ASSAY OF FOLIC ACID SERUM: CPT

## 2023-09-05 PROCEDURE — 84443 ASSAY THYROID STIM HORMONE: CPT

## 2023-09-05 PROCEDURE — 81001 URINALYSIS AUTO W/SCOPE: CPT

## 2023-09-05 PROCEDURE — 6360000002 HC RX W HCPCS: Performed by: STUDENT IN AN ORGANIZED HEALTH CARE EDUCATION/TRAINING PROGRAM

## 2023-09-05 RX ORDER — SODIUM CHLORIDE 9 MG/ML
INJECTION, SOLUTION INTRAVENOUS PRN
Status: DISCONTINUED | OUTPATIENT
Start: 2023-09-05 | End: 2023-09-07 | Stop reason: HOSPADM

## 2023-09-05 RX ORDER — SODIUM CHLORIDE, SODIUM LACTATE, POTASSIUM CHLORIDE, CALCIUM CHLORIDE 600; 310; 30; 20 MG/100ML; MG/100ML; MG/100ML; MG/100ML
INJECTION, SOLUTION INTRAVENOUS CONTINUOUS
Status: DISCONTINUED | OUTPATIENT
Start: 2023-09-05 | End: 2023-09-06

## 2023-09-05 RX ORDER — 0.9 % SODIUM CHLORIDE 0.9 %
1000 INTRAVENOUS SOLUTION INTRAVENOUS ONCE
Status: COMPLETED | OUTPATIENT
Start: 2023-09-05 | End: 2023-09-05

## 2023-09-05 RX ORDER — SODIUM CHLORIDE 0.9 % (FLUSH) 0.9 %
5-40 SYRINGE (ML) INJECTION EVERY 12 HOURS SCHEDULED
Status: DISCONTINUED | OUTPATIENT
Start: 2023-09-05 | End: 2023-09-07 | Stop reason: HOSPADM

## 2023-09-05 RX ORDER — POTASSIUM CHLORIDE 20 MEQ/1
40 TABLET, EXTENDED RELEASE ORAL PRN
Status: DISCONTINUED | OUTPATIENT
Start: 2023-09-05 | End: 2023-09-07 | Stop reason: HOSPADM

## 2023-09-05 RX ORDER — ACETAMINOPHEN 325 MG/1
650 TABLET ORAL EVERY 6 HOURS PRN
Status: DISCONTINUED | OUTPATIENT
Start: 2023-09-05 | End: 2023-09-07 | Stop reason: HOSPADM

## 2023-09-05 RX ORDER — POTASSIUM CHLORIDE 7.45 MG/ML
10 INJECTION INTRAVENOUS PRN
Status: DISCONTINUED | OUTPATIENT
Start: 2023-09-05 | End: 2023-09-07 | Stop reason: HOSPADM

## 2023-09-05 RX ORDER — ACETAMINOPHEN 650 MG/1
650 SUPPOSITORY RECTAL EVERY 6 HOURS PRN
Status: DISCONTINUED | OUTPATIENT
Start: 2023-09-05 | End: 2023-09-07 | Stop reason: HOSPADM

## 2023-09-05 RX ORDER — LIDOCAINE HYDROCHLORIDE 20 MG/ML
15 SOLUTION OROPHARYNGEAL ONCE
Status: COMPLETED | OUTPATIENT
Start: 2023-09-05 | End: 2023-09-05

## 2023-09-05 RX ORDER — ONDANSETRON 4 MG/1
4 TABLET, ORALLY DISINTEGRATING ORAL EVERY 8 HOURS PRN
Status: DISCONTINUED | OUTPATIENT
Start: 2023-09-05 | End: 2023-09-07 | Stop reason: HOSPADM

## 2023-09-05 RX ORDER — HYDRALAZINE HYDROCHLORIDE 20 MG/ML
20 INJECTION INTRAMUSCULAR; INTRAVENOUS EVERY 6 HOURS PRN
Status: DISCONTINUED | OUTPATIENT
Start: 2023-09-05 | End: 2023-09-07 | Stop reason: HOSPADM

## 2023-09-05 RX ORDER — MAGNESIUM SULFATE IN WATER 40 MG/ML
2000 INJECTION, SOLUTION INTRAVENOUS PRN
Status: DISCONTINUED | OUTPATIENT
Start: 2023-09-05 | End: 2023-09-07 | Stop reason: HOSPADM

## 2023-09-05 RX ORDER — ENOXAPARIN SODIUM 100 MG/ML
40 INJECTION SUBCUTANEOUS DAILY
Status: DISCONTINUED | OUTPATIENT
Start: 2023-09-05 | End: 2023-09-07 | Stop reason: HOSPADM

## 2023-09-05 RX ORDER — POLYETHYLENE GLYCOL 3350 17 G/17G
17 POWDER, FOR SOLUTION ORAL DAILY PRN
Status: DISCONTINUED | OUTPATIENT
Start: 2023-09-05 | End: 2023-09-07 | Stop reason: HOSPADM

## 2023-09-05 RX ORDER — ONDANSETRON 2 MG/ML
4 INJECTION INTRAMUSCULAR; INTRAVENOUS EVERY 6 HOURS PRN
Status: DISCONTINUED | OUTPATIENT
Start: 2023-09-05 | End: 2023-09-07 | Stop reason: HOSPADM

## 2023-09-05 RX ORDER — SODIUM CHLORIDE 0.9 % (FLUSH) 0.9 %
5-40 SYRINGE (ML) INJECTION PRN
Status: DISCONTINUED | OUTPATIENT
Start: 2023-09-05 | End: 2023-09-07 | Stop reason: HOSPADM

## 2023-09-05 RX ADMIN — SODIUM CHLORIDE 1000 ML: 9 INJECTION, SOLUTION INTRAVENOUS at 01:52

## 2023-09-05 RX ADMIN — SODIUM CHLORIDE, PRESERVATIVE FREE 1 MG: 5 INJECTION INTRAVENOUS at 05:09

## 2023-09-05 RX ADMIN — HYDRALAZINE HYDROCHLORIDE 20 MG: 20 INJECTION, SOLUTION INTRAMUSCULAR; INTRAVENOUS at 11:50

## 2023-09-05 RX ADMIN — SODIUM CHLORIDE, PRESERVATIVE FREE 10 ML: 5 INJECTION INTRAVENOUS at 11:10

## 2023-09-05 RX ADMIN — SODIUM CHLORIDE, POTASSIUM CHLORIDE, SODIUM LACTATE AND CALCIUM CHLORIDE: 600; 310; 30; 20 INJECTION, SOLUTION INTRAVENOUS at 22:59

## 2023-09-05 RX ADMIN — ACETAMINOPHEN 650 MG: 325 TABLET ORAL at 18:13

## 2023-09-05 RX ADMIN — ENOXAPARIN SODIUM 40 MG: 100 INJECTION SUBCUTANEOUS at 11:51

## 2023-09-05 RX ADMIN — LIDOCAINE HYDROCHLORIDE 15 ML: 20 SOLUTION ORAL; TOPICAL at 02:27

## 2023-09-05 RX ADMIN — SODIUM CHLORIDE, POTASSIUM CHLORIDE, SODIUM LACTATE AND CALCIUM CHLORIDE: 600; 310; 30; 20 INJECTION, SOLUTION INTRAVENOUS at 10:59

## 2023-09-05 RX ADMIN — SODIUM CHLORIDE 1000 ML: 9 INJECTION, SOLUTION INTRAVENOUS at 02:29

## 2023-09-05 ASSESSMENT — PAIN DESCRIPTION - DESCRIPTORS
DESCRIPTORS: ACHING
DESCRIPTORS: ACHING

## 2023-09-05 ASSESSMENT — PAIN SCALES - GENERAL
PAINLEVEL_OUTOF10: 3
PAINLEVEL_OUTOF10: 3
PAINLEVEL_OUTOF10: 0

## 2023-09-05 ASSESSMENT — PAIN DESCRIPTION - LOCATION
LOCATION: HEAD
LOCATION: HEAD

## 2023-09-05 ASSESSMENT — PAIN DESCRIPTION - ORIENTATION: ORIENTATION: MID

## 2023-09-05 NOTE — H&P
Hospital Medicine History & Physical        Name:  Rick Sousa  /Age/Sex: 1970  (47 y.o. male)  MRN & CSN:  [id number] & [id number]     PCP: Miracle Paul    Date of Admission: 2023    Date of Service: Patient seen/examined on 2023    Patient Status:  Inpatient - Patient will most likely require more than 48 hours of treatment and requires intensive medical treatment/monitoring     Chief Complaint:    Chief Complaint   Patient presents with    Dizziness    Altered Mental Status     Pt not alert to time. Unsure of year and president and does not remember what happened tonight. He was found unresponsive in his bathroom naked by his wife. Complaining of tongue pain         History Of Present Illness:     47 y.o. male with no significant past medical history who presented to [facility] with complaints of syncope and altered mental status. Patient states he felt hot and just passed out. He does not remember any events that brought him to the hospital prior to this event. According to chart, patient was found unresponsive in the bathroom by his wife complaining of tongue pain and passed out. Patient denies any history of seizures or strokes. He has never had this happen to him before. He is feeling some nausea in the ED and did throw up. No blood in vomit. Patient denies chest pain, shortness of breath, GI/ symptoms, edema, fever, or chills. Past Medical History:      No past medical history on file. Past Surgical History:      No past surgical history on file. Medications Prior to Admission:      Prior to Admission medications    Medication Sig Start Date End Date Taking? Authorizing Provider   ibuprofen (IBU) 800 MG tablet Take 1 tablet by mouth every 8 hours as needed for Pain 12/24/21 1/3/22  Maldonado Dubon PA-C       Allergies:  Iodine    Social History:      TOBACCO:   reports that he has been smoking cigarettes.  His smokeless tobacco use includes

## 2023-09-05 NOTE — ED NOTES
ED TO INPATIENT SBAR HANDOFF    Patient Name: Lili Fried   :  1970  47 y.o. MRN:  [id number]  Preferred Name  [address] [place] [address]  ED Room #:  ED-0001/01  Family/Caregiver Present no   Restraints no   Sitter no   Sepsis Risk Score Sepsis Risk Score: 0.66    Situation  Code Status: No Order No additional code details. Allergies: Iodine  Weight: No data found. Arrived from: home  Chief Complaint:   Chief Complaint   Patient presents with    Dizziness    Altered Mental Status     Pt not alert to time. Unsure of year and president and does not remember what happened tonight. He was found unresponsive in his bathroom naked by his wife. Complaining of tongue pain     Hospital Problem/Diagnosis:  Active Problems:    * No active hospital problems. *  Resolved Problems:    * No resolved hospital problems. *    Imaging:   XR CHEST PORTABLE   Final Result   No acute process. CT Head W/O Contrast   Final Result   No acute intracranial abnormality.            Abnormal labs:   Abnormal Labs Reviewed   LACTIC ACID - Abnormal; Notable for the following components:       Result Value    Lactic Acid 13.0 (*)     All other components within normal limits    Narrative:     [facility] tel. [phone],  Chemistry results called to and read back by RUTH Lee, 2023  01:55, by [facility]   COMPREHENSIVE METABOLIC PANEL W/ REFLEX TO MG FOR LOW K - Abnormal; Notable for the following components:    CO2 12 (*)     Anion Gap 25 (*)     Glucose 160 (*)     Creatinine 1.4 (*)     Total Bilirubin 1.2 (*)     All other components within normal limits    Narrative:     [facility] tel. [phone],  Chemistry results called to and read back by RUTH Lee, 2023  01:55, by [address] - Abnormal; Notable for the following components:    Ketones, Urine TRACE (*)     Blood, Urine TRACE (*)     Protein, UA TRACE (*)     All other components within normal limits   URINE DRUG SCREEN

## 2023-09-05 NOTE — PROGRESS NOTES
Patient was unable to hold still for MRI. Can attempt at a later time when the patient is able to cooperate.

## 2023-09-05 NOTE — PROGRESS NOTES
Facility/Department: [address]  Speech Language Pathology  DYSPHAGIA BEDSIDE SWALLOW EVALUATION     Patient: Abhishek Olvera   : 1970   MRN: [id number]      Evaluation Date: 2023   Admitting Diagnosis: Lactic acidosis [E87.20]  Seizure ([address]) [R56.9]  Altered mental status, unspecified altered mental status type [R41.82]  Pain: Denies                                                       H&P: Abhishek Olvera is a 47 y.o. male who presents to the ED for evaluation of altered mental status. Per EMS the patient had been complaining of not feeling very well tonight but not really giving specifics. Wife went to bed and was awoken by the dogs barking. She went into the bathroom and found him unresponsive laying in front of the toilet completely naked. Did have some blood coming from his mouth. She called EMS. On EMS arrival they did notice that he had some blood near his mouth and did have a few episodes of vomiting. He had no blood in the vomit. At that time he was responsive to painful stimuli. In route here he started to become more alert but still fairly confused. Patient is not able to give much history. Per squad he was very diaphoretic the whole time and somewhat agitated and combative initially as well. Patient has no significant medical history as far as they know. Imaging:  Chest X-ray:   IMPRESSION:  No acute process. Head CT: IMPRESSION:  No acute intracranial abnormality. MRI: ordered/pending    History/Prior Level of Function:   Living Status: Home  Prior Dysphagia History: No prior dysphagia history noted per chart review or per Pt report. Currently the Pt does report nausea  Reason for referral: SLP evaluation orders received due to CVA protocol  and altered mental status    Dysphagia Impressions/Diagnosis: Oropharyngeal Dysphagia   Pt alert and verbally responsive on RA. No overt facial asymmetry noted.  Upon oral motor assessment, anterior L tongue laceration and

## 2023-09-05 NOTE — ED PROVIDER NOTES
EMERGENCY MEDICINE ATTENDING NOTE  [place]. Nacho Allen., , Marilynn Martinez        CHIEF COMPLAINT  Chief Complaint   Patient presents with    Dizziness    Altered Mental Status     Pt not alert to time. Unsure of year and president and does not remember what happened tonight. He was found unresponsive in his bathroom naked by his wife. Complaining of tongue pain        HISTORY OF PRESENT ILLNESS  Abhishek Olvera is a 47 y.o. male who presents to the ED for evaluation of altered mental status. Per EMS the patient had been complaining of not feeling very well tonight but not really giving specifics. Wife went to bed and was awoken by the dogs barking. She went into the bathroom and found him unresponsive laying in front of the toilet completely naked. Did have some blood coming from his mouth. She called EMS. On EMS arrival they did notice that he had some blood near his mouth and did have a few episodes of vomiting. He had no blood in the vomit. At that time he was responsive to painful stimuli. In route here he started to become more alert but still fairly confused. Patient is not able to give much history. Per squad he was very diaphoretic the whole time and somewhat agitated and combative initially as well. Patient has no significant medical history as far as they know. Nursing/triage notes reviewed. No other complaints, modifying factors or associated symptoms. REVIEW OF SYSTEMS:  All systems are reviewed and are negative unless noted in the HPI. PAST MEDICAL HISTORY  No past medical history on file. SURGICAL HISTORY  No past surgical history on file. FAMILY HISTORY  No family history on file.     SOCIAL HISTORY  Social History     Socioeconomic History    Marital status: Single     Spouse name: Not on file    Number of children: Not on file    Years of education: Not on file    Highest education level: Not on file   Occupational History    Not on file   Tobacco Use    Smoking

## 2023-09-05 NOTE — ACP (ADVANCE CARE PLANNING)
Advanced Care Planning Note. Purpose of Encounter: Advanced care planning in light of seizure  Parties In Attendance: Patient,   Decisional Capacity: Yes  Subjective: syncope  Objective: Cr 1.4  Goals of Care Determination: Patient/POA wishes to seek full treatment  Plan:  Neurology consult. IVF. Labs. Code Status: Full code    Time spent on Advanced care Plannin minutes  Advanced Care Planning Documents: Completed advanced directives on chart, Kevin Valdez, is the POA.     Piotr Duarte DO  2023 7:43 AM

## 2023-09-06 ENCOUNTER — APPOINTMENT (OUTPATIENT)
Dept: MRI IMAGING | Age: 53
End: 2023-09-06
Payer: COMMERCIAL

## 2023-09-06 PROBLEM — N17.9 AKI (ACUTE KIDNEY INJURY) (HCC): Status: RESOLVED | Noted: 2023-09-05 | Resolved: 2023-09-06

## 2023-09-06 PROBLEM — G93.41 ACUTE METABOLIC ENCEPHALOPATHY: Status: RESOLVED | Noted: 2023-09-05 | Resolved: 2023-09-06

## 2023-09-06 PROBLEM — R41.82 ALTERED MENTAL STATUS: Status: RESOLVED | Noted: 2023-09-05 | Resolved: 2023-09-06

## 2023-09-06 LAB
ALBUMIN SERPL-MCNC: 3.6 G/DL (ref 3.4–5)
ANION GAP SERPL CALCULATED.3IONS-SCNC: 13 MMOL/L (ref 3–16)
BASOPHILS # BLD: 0.1 K/UL (ref 0–0.2)
BASOPHILS NFR BLD: 0.8 %
BUN SERPL-MCNC: 8 MG/DL (ref 7–20)
CALCIUM SERPL-MCNC: 9.4 MG/DL (ref 8.3–10.6)
CHLORIDE SERPL-SCNC: 108 MMOL/L (ref 99–110)
CO2 SERPL-SCNC: 17 MMOL/L (ref 21–32)
CREAT SERPL-MCNC: 0.9 MG/DL (ref 0.9–1.3)
DEPRECATED RDW RBC AUTO: 12.7 % (ref 12.4–15.4)
EOSINOPHIL # BLD: 0 K/UL (ref 0–0.6)
EOSINOPHIL NFR BLD: 0.2 %
EST. AVERAGE GLUCOSE BLD GHB EST-MCNC: 93.9 MG/DL
GFR SERPLBLD CREATININE-BSD FMLA CKD-EPI: >60 ML/MIN/{1.73_M2}
GLUCOSE SERPL-MCNC: 125 MG/DL (ref 70–99)
HBA1C MFR BLD: 4.9 %
HCT VFR BLD AUTO: 43.9 % (ref 40.5–52.5)
HGB BLD-MCNC: 15.3 G/DL (ref 13.5–17.5)
LYMPHOCYTES # BLD: 1 K/UL (ref 1–5.1)
LYMPHOCYTES NFR BLD: 8.9 %
MAGNESIUM SERPL-MCNC: 2.5 MG/DL (ref 1.8–2.4)
MCH RBC QN AUTO: 31.6 PG (ref 26–34)
MCHC RBC AUTO-ENTMCNC: 34.8 G/DL (ref 31–36)
MCV RBC AUTO: 90.8 FL (ref 80–100)
MONOCYTES # BLD: 0.9 K/UL (ref 0–1.3)
MONOCYTES NFR BLD: 8.3 %
NEUTROPHILS # BLD: 8.9 K/UL (ref 1.7–7.7)
NEUTROPHILS NFR BLD: 81.8 %
PHOSPHATE SERPL-MCNC: 2.4 MG/DL (ref 2.5–4.9)
PLATELET # BLD AUTO: 254 K/UL (ref 135–450)
PMV BLD AUTO: 7.6 FL (ref 5–10.5)
POTASSIUM SERPL-SCNC: 3.5 MMOL/L (ref 3.5–5.1)
RBC # BLD AUTO: 4.84 M/UL (ref 4.2–5.9)
REASON FOR REJECTION: NORMAL
REJECTED TEST: NORMAL
SODIUM SERPL-SCNC: 138 MMOL/L (ref 136–145)
WBC # BLD AUTO: 10.9 K/UL (ref 4–11)

## 2023-09-06 PROCEDURE — 1200000000 HC SEMI PRIVATE

## 2023-09-06 PROCEDURE — 97165 OT EVAL LOW COMPLEX 30 MIN: CPT

## 2023-09-06 PROCEDURE — A9577 INJ MULTIHANCE: HCPCS | Performed by: OBSTETRICS & GYNECOLOGY

## 2023-09-06 PROCEDURE — 93306 TTE W/DOPPLER COMPLETE: CPT

## 2023-09-06 PROCEDURE — 80069 RENAL FUNCTION PANEL: CPT

## 2023-09-06 PROCEDURE — 6360000002 HC RX W HCPCS: Performed by: STUDENT IN AN ORGANIZED HEALTH CARE EDUCATION/TRAINING PROGRAM

## 2023-09-06 PROCEDURE — 2580000003 HC RX 258: Performed by: STUDENT IN AN ORGANIZED HEALTH CARE EDUCATION/TRAINING PROGRAM

## 2023-09-06 PROCEDURE — 70552 MRI BRAIN STEM W/DYE: CPT

## 2023-09-06 PROCEDURE — APPNB30 APP NON BILLABLE TIME 0-30 MINS

## 2023-09-06 PROCEDURE — 6360000004 HC RX CONTRAST MEDICATION: Performed by: OBSTETRICS & GYNECOLOGY

## 2023-09-06 PROCEDURE — 70551 MRI BRAIN STEM W/O DYE: CPT

## 2023-09-06 PROCEDURE — 6360000002 HC RX W HCPCS: Performed by: SURGERY

## 2023-09-06 PROCEDURE — 85025 COMPLETE CBC W/AUTO DIFF WBC: CPT

## 2023-09-06 PROCEDURE — 36415 COLL VENOUS BLD VENIPUNCTURE: CPT

## 2023-09-06 PROCEDURE — 99233 SBSQ HOSP IP/OBS HIGH 50: CPT | Performed by: PSYCHIATRY & NEUROLOGY

## 2023-09-06 PROCEDURE — 6370000000 HC RX 637 (ALT 250 FOR IP): Performed by: STUDENT IN AN ORGANIZED HEALTH CARE EDUCATION/TRAINING PROGRAM

## 2023-09-06 PROCEDURE — 97161 PT EVAL LOW COMPLEX 20 MIN: CPT

## 2023-09-06 PROCEDURE — 83735 ASSAY OF MAGNESIUM: CPT

## 2023-09-06 RX ORDER — AMLODIPINE BESYLATE 5 MG/1
5 TABLET ORAL DAILY
Status: DISCONTINUED | OUTPATIENT
Start: 2023-09-06 | End: 2023-09-07 | Stop reason: HOSPADM

## 2023-09-06 RX ORDER — LORAZEPAM 2 MG/ML
0.5 INJECTION INTRAMUSCULAR
Status: COMPLETED | OUTPATIENT
Start: 2023-09-06 | End: 2023-09-06

## 2023-09-06 RX ORDER — AMLODIPINE BESYLATE 5 MG/1
5 TABLET ORAL DAILY
Qty: 30 TABLET | Refills: 3 | Status: ON HOLD | OUTPATIENT
Start: 2023-09-06 | End: 2023-09-14 | Stop reason: HOSPADM

## 2023-09-06 RX ORDER — LORAZEPAM 1 MG/1
1 TABLET ORAL ONCE
Status: COMPLETED | OUTPATIENT
Start: 2023-09-06 | End: 2023-09-06

## 2023-09-06 RX ADMIN — LORAZEPAM 0.5 MG: 2 INJECTION INTRAMUSCULAR; INTRAVENOUS at 06:53

## 2023-09-06 RX ADMIN — ENOXAPARIN SODIUM 40 MG: 100 INJECTION SUBCUTANEOUS at 08:24

## 2023-09-06 RX ADMIN — GADOBENATE DIMEGLUMINE 17 ML: 529 INJECTION, SOLUTION INTRAVENOUS at 15:48

## 2023-09-06 RX ADMIN — LORAZEPAM 1 MG: 1 TABLET ORAL at 15:15

## 2023-09-06 RX ADMIN — HYDRALAZINE HYDROCHLORIDE 20 MG: 20 INJECTION, SOLUTION INTRAMUSCULAR; INTRAVENOUS at 04:52

## 2023-09-06 RX ADMIN — AMLODIPINE BESYLATE 5 MG: 5 TABLET ORAL at 08:27

## 2023-09-06 RX ADMIN — SODIUM CHLORIDE, PRESERVATIVE FREE 10 ML: 5 INJECTION INTRAVENOUS at 21:18

## 2023-09-06 RX ADMIN — ACETAMINOPHEN 650 MG: 325 TABLET ORAL at 06:44

## 2023-09-06 RX ADMIN — HYDRALAZINE HYDROCHLORIDE 20 MG: 20 INJECTION, SOLUTION INTRAMUSCULAR; INTRAVENOUS at 21:18

## 2023-09-06 ASSESSMENT — PAIN DESCRIPTION - ORIENTATION: ORIENTATION: POSTERIOR

## 2023-09-06 ASSESSMENT — PAIN DESCRIPTION - LOCATION: LOCATION: HEAD

## 2023-09-06 ASSESSMENT — PAIN DESCRIPTION - PAIN TYPE: TYPE: ACUTE PAIN

## 2023-09-06 ASSESSMENT — PAIN DESCRIPTION - DESCRIPTORS: DESCRIPTORS: ACHING;HEAVINESS

## 2023-09-06 ASSESSMENT — PAIN SCALES - WONG BAKER: WONGBAKER_NUMERICALRESPONSE: 0

## 2023-09-06 ASSESSMENT — PAIN - FUNCTIONAL ASSESSMENT: PAIN_FUNCTIONAL_ASSESSMENT: PREVENTS OR INTERFERES SOME ACTIVE ACTIVITIES AND ADLS

## 2023-09-06 ASSESSMENT — PAIN DESCRIPTION - ONSET: ONSET: GRADUAL

## 2023-09-06 ASSESSMENT — PAIN SCALES - GENERAL: PAINLEVEL_OUTOF10: 8

## 2023-09-06 ASSESSMENT — PAIN DESCRIPTION - FREQUENCY: FREQUENCY: INTERMITTENT

## 2023-09-06 NOTE — PROGRESS NOTES
[facility] Department   Phone: [phone]    Occupational Therapy    [x] Initial Evaluation            [] Daily Treatment Note         [x] Discharge Summary      Patient: Mala Starks   : 1970   MRN: [id number]   Date of Service:  2023    Admitting Diagnosis:  New onset seizure [facility])  Current Admission Summary: 47y.o.  years old male with minimal past medical history who was brought to the hospital due to altered mental status. Per patient's significant other, patient reported, yesterday evening before bed, he felt lightheaded. Reportedly symptoms resolved and patient went to bed. Later in the evening, patient's wife woken up with the dogs barking and patient not in bed. She noticed bathroom light was on and went to check on patient in the bathroom. Patient was found slumped backwards on commode with saliva coming out of his mouth. She noted patient was breathing heavily and was not responding for her. Degree severe duration unknown could be minutes to hours. Patient has no prior history of such events. no family or personal history of seizures. Significant other also noticed patient bit his tongue and had blood near his mouth. Also noted to have a few episodes of vomiting during this time. EMS was called and patient was brought to the emergency room. Patient was noted to be diaphoretic for EMS. In the emergency room CT head showed no acute intracranial abnormality. Lab work-up showed lactic acid of 13, CO2 of 12, creatinine of 1.4, UDS was positive for cannabinoids. Today patient is drowsy but arousable. He can follow some direction but closes eyes immediately falls back asleep. Past Medical History:  has no past medical history on file. Past Surgical History:  has no past surgical history on file. Discharge Recommendations: Mala Starks scored a 24/24 on the AM-PAC ADL Inpatient form.   At this time, no further OT is recommended upon above.       Therapy Session Time     Individual Group Co-treatment   Time In    4608   Time Out    1147   Minutes    11        Timed Code Treatment Minutes:   0  Total Treatment Minutes:  11       Electronically Signed By: ARACELIS Edwards/REGGIE [id number]

## 2023-09-06 NOTE — CARE COORDINATION
DISCHARGE PLANNING:    Patient is from home. Anticipate no needs at discharge. Neuro is following. CM team available if discharge needs arise.   Lona Bills, RN Case Manager  [phone]

## 2023-09-06 NOTE — PLAN OF CARE
Problem: Pain  Goal: Verbalizes/displays adequate comfort level or baseline comfort level  Outcome: Progressing     Problem: Safety - Adult  Goal: Free from fall injury  Outcome: Progressing     Problem: Discharge Planning  Goal: Discharge to home or other facility with appropriate resources  Outcome: Progressing  Flowsheets (Taken 9/5/2023 1100 by Kalina Lee RN)  Discharge to home or other facility with appropriate resources:   Identify barriers to discharge with patient and caregiver   Arrange for needed discharge resources and transportation as appropriate   Identify discharge learning needs (meds, wound care, etc)

## 2023-09-06 NOTE — PROGRESS NOTES
WFL  ROM:   (B) LE ROM WFL  Strength:   (B) LE gross strength WFL  Therapist Clinical Decision Making (Complexity): low complexity  Clinical Presentation: stable      Subjective  General: Pt asleep in side lying upon arrival, awakes to voice. Pt agreeable to PT/OT evaluation. Pain: 7/10. Location: headache  Pain Interventions: pain medication in place prior to arrival       Functional Mobility  Bed Mobility  Supine to Sit: Independent  Sit to Supine: Independent  Comments: Pt performed bed mobility with HOB flat. Pt returned to supine in bed following amb within the room. Transfers  Sit to stand transfer: Independent  Stand to sit transfer: Independent  Comments: Pt transferred to sit EOB without assistance or device. Ambulation  Surface:level surface  Assistive Device: no device  Assistance: Independent  Distance: 30'  Gait Mechanics: decreased toe clearance on L, step through pattern, slight path deviation  Comments:  Pt amb within room without assistance or device, refusing ambulation in hallway. Stair Mobility  Stair mobility not completed on this date.   Comments:  Balance  Static Sitting Balance: good: independent with functional balance in unsupported position  Dynamic Sitting Balance: good: independent with functional balance in unsupported position  Static Standing Balance: good: independent with functional balance in unsupported position  Dynamic Standing Balance: good: independent with functional balance in unsupported position  Comments:    Other Therapeutic Interventions    Functional Outcomes  AM-PAC Inpatient Mobility Raw Score : 24              Cognition  WFL  Orientation:    A&O x 4    Education  Barriers To Learning: none  Patient Education: patient educated on goals, PT role and benefits, plan of care, discharge recommendations  Learning Assessment:  Pt verbalized and demonstrates understanding    Assessment  Activity Tolerance: Good   Impairments Requiring Therapeutic Intervention: none

## 2023-09-07 VITALS
BODY MASS INDEX: 28.81 KG/M2 | DIASTOLIC BLOOD PRESSURE: 60 MMHG | SYSTOLIC BLOOD PRESSURE: 157 MMHG | HEART RATE: 88 BPM | OXYGEN SATURATION: 65 % | TEMPERATURE: 98.6 F | WEIGHT: 200.8 LBS | RESPIRATION RATE: 16 BRPM

## 2023-09-07 LAB
ALBUMIN SERPL-MCNC: 4 G/DL (ref 3.4–5)
ANION GAP SERPL CALCULATED.3IONS-SCNC: 11 MMOL/L (ref 3–16)
BASOPHILS # BLD: 0 K/UL (ref 0–0.2)
BASOPHILS NFR BLD: 0.3 %
BUN SERPL-MCNC: 8 MG/DL (ref 7–20)
CALCIUM SERPL-MCNC: 9.6 MG/DL (ref 8.3–10.6)
CHLORIDE SERPL-SCNC: 104 MMOL/L (ref 99–110)
CO2 SERPL-SCNC: 23 MMOL/L (ref 21–32)
CREAT SERPL-MCNC: 1 MG/DL (ref 0.9–1.3)
DEPRECATED RDW RBC AUTO: 12.8 % (ref 12.4–15.4)
EOSINOPHIL # BLD: 0 K/UL (ref 0–0.6)
EOSINOPHIL NFR BLD: 0.1 %
GFR SERPLBLD CREATININE-BSD FMLA CKD-EPI: >60 ML/MIN/{1.73_M2}
GLUCOSE SERPL-MCNC: 119 MG/DL (ref 70–99)
HCT VFR BLD AUTO: 45.7 % (ref 40.5–52.5)
HGB BLD-MCNC: 15.7 G/DL (ref 13.5–17.5)
LYMPHOCYTES # BLD: 0.9 K/UL (ref 1–5.1)
LYMPHOCYTES NFR BLD: 10 %
MAGNESIUM SERPL-MCNC: 2 MG/DL (ref 1.8–2.4)
MCH RBC QN AUTO: 31.4 PG (ref 26–34)
MCHC RBC AUTO-ENTMCNC: 34.2 G/DL (ref 31–36)
MCV RBC AUTO: 91.7 FL (ref 80–100)
MONOCYTES # BLD: 0.9 K/UL (ref 0–1.3)
MONOCYTES NFR BLD: 9.6 %
NEUTROPHILS # BLD: 7.6 K/UL (ref 1.7–7.7)
NEUTROPHILS NFR BLD: 80 %
PHOSPHATE SERPL-MCNC: 3.5 MG/DL (ref 2.5–4.9)
PLATELET # BLD AUTO: 250 K/UL (ref 135–450)
PMV BLD AUTO: 7.2 FL (ref 5–10.5)
POTASSIUM SERPL-SCNC: 3.8 MMOL/L (ref 3.5–5.1)
RBC # BLD AUTO: 4.99 M/UL (ref 4.2–5.9)
SODIUM SERPL-SCNC: 138 MMOL/L (ref 136–145)
WBC # BLD AUTO: 9.5 K/UL (ref 4–11)

## 2023-09-07 PROCEDURE — 99232 SBSQ HOSP IP/OBS MODERATE 35: CPT | Performed by: PSYCHIATRY & NEUROLOGY

## 2023-09-07 PROCEDURE — 83735 ASSAY OF MAGNESIUM: CPT

## 2023-09-07 PROCEDURE — 6370000000 HC RX 637 (ALT 250 FOR IP): Performed by: STUDENT IN AN ORGANIZED HEALTH CARE EDUCATION/TRAINING PROGRAM

## 2023-09-07 PROCEDURE — 2580000003 HC RX 258: Performed by: STUDENT IN AN ORGANIZED HEALTH CARE EDUCATION/TRAINING PROGRAM

## 2023-09-07 PROCEDURE — 36415 COLL VENOUS BLD VENIPUNCTURE: CPT

## 2023-09-07 PROCEDURE — 6360000002 HC RX W HCPCS: Performed by: STUDENT IN AN ORGANIZED HEALTH CARE EDUCATION/TRAINING PROGRAM

## 2023-09-07 PROCEDURE — APPNB30 APP NON BILLABLE TIME 0-30 MINS

## 2023-09-07 PROCEDURE — APPSS15 APP SPLIT SHARED TIME 0-15 MINUTES

## 2023-09-07 PROCEDURE — 80069 RENAL FUNCTION PANEL: CPT

## 2023-09-07 PROCEDURE — 85025 COMPLETE CBC W/AUTO DIFF WBC: CPT

## 2023-09-07 RX ADMIN — ENOXAPARIN SODIUM 40 MG: 100 INJECTION SUBCUTANEOUS at 08:01

## 2023-09-07 RX ADMIN — AMLODIPINE BESYLATE 5 MG: 5 TABLET ORAL at 08:01

## 2023-09-07 RX ADMIN — SODIUM CHLORIDE, PRESERVATIVE FREE 10 ML: 5 INJECTION INTRAVENOUS at 08:01

## 2023-09-07 NOTE — DISCHARGE INSTR - DIET

## 2023-09-08 ENCOUNTER — HOSPITAL ENCOUNTER (INPATIENT)
Age: 53
LOS: 6 days | Discharge: HOME | DRG: 101 | End: 2023-09-14
Attending: EMERGENCY MEDICINE | Admitting: INTERNAL MEDICINE
Payer: COMMERCIAL

## 2023-09-08 ENCOUNTER — APPOINTMENT (OUTPATIENT)
Dept: CT IMAGING | Age: 53
DRG: 101 | End: 2023-09-08
Payer: COMMERCIAL

## 2023-09-08 ENCOUNTER — APPOINTMENT (OUTPATIENT)
Dept: MRI IMAGING | Age: 53
DRG: 101 | End: 2023-09-08
Payer: COMMERCIAL

## 2023-09-08 DIAGNOSIS — N17.9 AKI (ACUTE KIDNEY INJURY) (HCC): ICD-10-CM

## 2023-09-08 DIAGNOSIS — R56.9 SEIZURE (HCC): Primary | ICD-10-CM

## 2023-09-08 PROBLEM — F12.20 CANNABIS DEPENDENCE (HCC): Status: ACTIVE | Noted: 2023-09-08

## 2023-09-08 PROBLEM — R90.89 ABNORMAL BRAIN MRI: Status: ACTIVE | Noted: 2023-09-08

## 2023-09-08 PROBLEM — F17.200 SMOKING: Status: ACTIVE | Noted: 2023-09-08

## 2023-09-08 PROBLEM — Z78.9 ALCOHOL USE: Status: ACTIVE | Noted: 2023-09-08

## 2023-09-08 PROBLEM — G04.90 ENCEPHALITIS: Status: ACTIVE | Noted: 2023-09-08

## 2023-09-08 PROBLEM — I10 PRIMARY HYPERTENSION: Status: ACTIVE | Noted: 2023-09-08

## 2023-09-08 PROBLEM — B00.4 ENCEPHALITIS DUE TO HUMAN HERPES SIMPLEX VIRUS (HSV): Status: ACTIVE | Noted: 2023-09-08

## 2023-09-08 LAB
ALBUMIN SERPL-MCNC: 4.5 G/DL (ref 3.4–5)
ALBUMIN/GLOB SERPL: 1.4 {RATIO} (ref 1.1–2.2)
ALP SERPL-CCNC: 111 U/L (ref 40–129)
ALT SERPL-CCNC: 19 U/L (ref 10–40)
ANION GAP SERPL CALCULATED.3IONS-SCNC: 13 MMOL/L (ref 3–16)
AST SERPL-CCNC: 21 U/L (ref 15–37)
BASOPHILS # BLD: 0.1 K/UL (ref 0–0.2)
BASOPHILS NFR BLD: 0.9 %
BILIRUB SERPL-MCNC: 1.5 MG/DL (ref 0–1)
BUN SERPL-MCNC: 13 MG/DL (ref 7–20)
CALCIUM SERPL-MCNC: 9.8 MG/DL (ref 8.3–10.6)
CHLORIDE SERPL-SCNC: 102 MMOL/L (ref 99–110)
CK SERPL-CCNC: 178 U/L (ref 39–308)
CO2 SERPL-SCNC: 23 MMOL/L (ref 21–32)
CREAT SERPL-MCNC: 1 MG/DL (ref 0.9–1.3)
DEPRECATED RDW RBC AUTO: 12.7 % (ref 12.4–15.4)
EOSINOPHIL # BLD: 0.1 K/UL (ref 0–0.6)
EOSINOPHIL NFR BLD: 1.3 %
ETHANOLAMINE SERPL-MCNC: NORMAL MG/DL (ref 0–0.08)
FOLATE SERPL-MCNC: 9.52 NG/ML (ref 4.78–24.2)
GFR SERPLBLD CREATININE-BSD FMLA CKD-EPI: >60 ML/MIN/{1.73_M2}
GLUCOSE SERPL-MCNC: 141 MG/DL (ref 70–99)
HCT VFR BLD AUTO: 48.9 % (ref 40.5–52.5)
HGB BLD-MCNC: 17.6 G/DL (ref 13.5–17.5)
LACTATE BLDV-SCNC: 1.3 MMOL/L (ref 0.4–1.9)
LACTATE BLDV-SCNC: 1.8 MMOL/L (ref 0.4–1.9)
LYMPHOCYTES # BLD: 1.5 K/UL (ref 1–5.1)
LYMPHOCYTES NFR BLD: 18.4 %
MCH RBC QN AUTO: 32.5 PG (ref 26–34)
MCHC RBC AUTO-ENTMCNC: 36 G/DL (ref 31–36)
MCV RBC AUTO: 90.3 FL (ref 80–100)
MONOCYTES # BLD: 0.9 K/UL (ref 0–1.3)
MONOCYTES NFR BLD: 10.6 %
NEUTROPHILS # BLD: 5.7 K/UL (ref 1.7–7.7)
NEUTROPHILS NFR BLD: 68.8 %
PLATELET # BLD AUTO: 271 K/UL (ref 135–450)
PMV BLD AUTO: 7.3 FL (ref 5–10.5)
POTASSIUM SERPL-SCNC: 4 MMOL/L (ref 3.5–5.1)
PROCALCITONIN SERPL IA-MCNC: 0.04 NG/ML (ref 0–0.15)
PROT SERPL-MCNC: 7.7 G/DL (ref 6.4–8.2)
RBC # BLD AUTO: 5.41 M/UL (ref 4.2–5.9)
SODIUM SERPL-SCNC: 138 MMOL/L (ref 136–145)
T4 FREE SERPL-MCNC: 1.5 NG/DL (ref 0.9–1.8)
TSH SERPL DL<=0.005 MIU/L-ACNC: 4.88 UIU/ML (ref 0.27–4.2)
VIT B12 SERPL-MCNC: 616 PG/ML (ref 211–911)
WBC # BLD AUTO: 8.3 K/UL (ref 4–11)

## 2023-09-08 PROCEDURE — 82077 ASSAY SPEC XCP UR&BREATH IA: CPT

## 2023-09-08 PROCEDURE — 6360000002 HC RX W HCPCS: Performed by: INTERNAL MEDICINE

## 2023-09-08 PROCEDURE — 93005 ELECTROCARDIOGRAM TRACING: CPT | Performed by: EMERGENCY MEDICINE

## 2023-09-08 PROCEDURE — A9577 INJ MULTIHANCE: HCPCS | Performed by: PHYSICIAN ASSISTANT

## 2023-09-08 PROCEDURE — 84145 PROCALCITONIN (PCT): CPT

## 2023-09-08 PROCEDURE — 6360000002 HC RX W HCPCS: Performed by: PHYSICIAN ASSISTANT

## 2023-09-08 PROCEDURE — 82746 ASSAY OF FOLIC ACID SERUM: CPT

## 2023-09-08 PROCEDURE — 6360000004 HC RX CONTRAST MEDICATION: Performed by: PHYSICIAN ASSISTANT

## 2023-09-08 PROCEDURE — 70450 CT HEAD/BRAIN W/O DYE: CPT

## 2023-09-08 PROCEDURE — 84443 ASSAY THYROID STIM HORMONE: CPT

## 2023-09-08 PROCEDURE — 2580000003 HC RX 258: Performed by: INTERNAL MEDICINE

## 2023-09-08 PROCEDURE — 36415 COLL VENOUS BLD VENIPUNCTURE: CPT

## 2023-09-08 PROCEDURE — 87529 HSV DNA AMP PROBE: CPT

## 2023-09-08 PROCEDURE — 96365 THER/PROPH/DIAG IV INF INIT: CPT

## 2023-09-08 PROCEDURE — 84157 ASSAY OF PROTEIN OTHER: CPT

## 2023-09-08 PROCEDURE — 2060000000 HC ICU INTERMEDIATE R&B

## 2023-09-08 PROCEDURE — 6360000002 HC RX W HCPCS: Performed by: REGISTERED NURSE

## 2023-09-08 PROCEDURE — 99223 1ST HOSP IP/OBS HIGH 75: CPT | Performed by: INTERNAL MEDICINE

## 2023-09-08 PROCEDURE — 86592 SYPHILIS TEST NON-TREP QUAL: CPT

## 2023-09-08 PROCEDURE — 85025 COMPLETE CBC W/AUTO DIFF WBC: CPT

## 2023-09-08 PROCEDURE — 96375 TX/PRO/DX INJ NEW DRUG ADDON: CPT

## 2023-09-08 PROCEDURE — 70553 MRI BRAIN STEM W/O & W/DYE: CPT

## 2023-09-08 PROCEDURE — 82607 VITAMIN B-12: CPT

## 2023-09-08 PROCEDURE — 84439 ASSAY OF FREE THYROXINE: CPT

## 2023-09-08 PROCEDURE — 83605 ASSAY OF LACTIC ACID: CPT

## 2023-09-08 PROCEDURE — 83916 OLIGOCLONAL BANDS: CPT

## 2023-09-08 PROCEDURE — 2500000003 HC RX 250 WO HCPCS: Performed by: INTERNAL MEDICINE

## 2023-09-08 PROCEDURE — 82042 OTHER SOURCE ALBUMIN QUAN EA: CPT

## 2023-09-08 PROCEDURE — 96376 TX/PRO/DX INJ SAME DRUG ADON: CPT

## 2023-09-08 PROCEDURE — 2500000003 HC RX 250 WO HCPCS: Performed by: REGISTERED NURSE

## 2023-09-08 PROCEDURE — 99285 EMERGENCY DEPT VISIT HI MDM: CPT

## 2023-09-08 PROCEDURE — 2580000003 HC RX 258: Performed by: PHYSICIAN ASSISTANT

## 2023-09-08 PROCEDURE — 82784 ASSAY IGA/IGD/IGG/IGM EACH: CPT

## 2023-09-08 PROCEDURE — 80053 COMPREHEN METABOLIC PANEL: CPT

## 2023-09-08 PROCEDURE — 96361 HYDRATE IV INFUSION ADD-ON: CPT

## 2023-09-08 PROCEDURE — 89050 BODY FLUID CELL COUNT: CPT

## 2023-09-08 PROCEDURE — 82550 ASSAY OF CK (CPK): CPT

## 2023-09-08 PROCEDURE — 82040 ASSAY OF SERUM ALBUMIN: CPT

## 2023-09-08 RX ORDER — 0.9 % SODIUM CHLORIDE 0.9 %
1000 INTRAVENOUS SOLUTION INTRAVENOUS ONCE
Status: COMPLETED | OUTPATIENT
Start: 2023-09-08 | End: 2023-09-08

## 2023-09-08 RX ORDER — ROSUVASTATIN CALCIUM 40 MG/1
40 TABLET, COATED ORAL NIGHTLY
Status: DISCONTINUED | OUTPATIENT
Start: 2023-09-08 | End: 2023-09-13

## 2023-09-08 RX ORDER — ASPIRIN 300 MG/1
300 SUPPOSITORY RECTAL DAILY
Status: DISCONTINUED | OUTPATIENT
Start: 2023-09-09 | End: 2023-09-13

## 2023-09-08 RX ORDER — LEVETIRACETAM 5 MG/ML
500 INJECTION INTRAVASCULAR 2 TIMES DAILY
Status: DISCONTINUED | OUTPATIENT
Start: 2023-09-09 | End: 2023-09-13

## 2023-09-08 RX ORDER — ONDANSETRON 2 MG/ML
4 INJECTION INTRAMUSCULAR; INTRAVENOUS EVERY 6 HOURS PRN
Status: DISCONTINUED | OUTPATIENT
Start: 2023-09-08 | End: 2023-09-14 | Stop reason: HOSPADM

## 2023-09-08 RX ORDER — LORAZEPAM 2 MG/ML
2 INJECTION INTRAMUSCULAR ONCE
Status: COMPLETED | OUTPATIENT
Start: 2023-09-08 | End: 2023-09-08

## 2023-09-08 RX ORDER — ENOXAPARIN SODIUM 100 MG/ML
40 INJECTION SUBCUTANEOUS
Status: DISCONTINUED | OUTPATIENT
Start: 2023-09-08 | End: 2023-09-14 | Stop reason: HOSPADM

## 2023-09-08 RX ORDER — LORAZEPAM 2 MG/ML
1 INJECTION INTRAMUSCULAR ONCE
Status: COMPLETED | OUTPATIENT
Start: 2023-09-08 | End: 2023-09-08

## 2023-09-08 RX ORDER — LORAZEPAM 2 MG/ML
2 INJECTION INTRAMUSCULAR ONCE
Status: DISCONTINUED | OUTPATIENT
Start: 2023-09-08 | End: 2023-09-13

## 2023-09-08 RX ORDER — DIPHENHYDRAMINE HYDROCHLORIDE 50 MG/ML
25 INJECTION INTRAMUSCULAR; INTRAVENOUS ONCE
Status: DISCONTINUED | OUTPATIENT
Start: 2023-09-08 | End: 2023-09-13

## 2023-09-08 RX ORDER — OLANZAPINE 10 MG/1
5 INJECTION, POWDER, LYOPHILIZED, FOR SOLUTION INTRAMUSCULAR ONCE
Status: COMPLETED | OUTPATIENT
Start: 2023-09-08 | End: 2023-09-08

## 2023-09-08 RX ORDER — KETAMINE HCL IN NACL, ISO-OSM 100MG/10ML
50 SYRINGE (ML) INJECTION ONCE
Status: DISCONTINUED | OUTPATIENT
Start: 2023-09-08 | End: 2023-09-08

## 2023-09-08 RX ORDER — LABETALOL HYDROCHLORIDE 5 MG/ML
10 INJECTION, SOLUTION INTRAVENOUS EVERY 10 MIN PRN
Status: DISCONTINUED | OUTPATIENT
Start: 2023-09-08 | End: 2023-09-10

## 2023-09-08 RX ORDER — ONDANSETRON 4 MG/1
4 TABLET, ORALLY DISINTEGRATING ORAL EVERY 8 HOURS PRN
Status: DISCONTINUED | OUTPATIENT
Start: 2023-09-08 | End: 2023-09-14 | Stop reason: HOSPADM

## 2023-09-08 RX ORDER — WATER 1000 ML/1000ML
INJECTION, SOLUTION INTRAVENOUS
Status: DISPENSED
Start: 2023-09-08 | End: 2023-09-09

## 2023-09-08 RX ORDER — SODIUM CHLORIDE 0.9 % (FLUSH) 0.9 %
5-40 SYRINGE (ML) INJECTION PRN
Status: DISCONTINUED | OUTPATIENT
Start: 2023-09-08 | End: 2023-09-14 | Stop reason: HOSPADM

## 2023-09-08 RX ORDER — LORAZEPAM 2 MG/ML
1 INJECTION INTRAMUSCULAR EVERY 4 HOURS PRN
Status: DISCONTINUED | OUTPATIENT
Start: 2023-09-08 | End: 2023-09-13

## 2023-09-08 RX ORDER — LORAZEPAM 2 MG/ML
2 INJECTION INTRAMUSCULAR EVERY 6 HOURS PRN
Status: DISCONTINUED | OUTPATIENT
Start: 2023-09-08 | End: 2023-09-13

## 2023-09-08 RX ORDER — ASPIRIN 81 MG/1
81 TABLET, CHEWABLE ORAL DAILY
Status: DISCONTINUED | OUTPATIENT
Start: 2023-09-09 | End: 2023-09-13

## 2023-09-08 RX ORDER — LEVETIRACETAM 10 MG/ML
1000 INJECTION INTRAVASCULAR ONCE
Status: COMPLETED | OUTPATIENT
Start: 2023-09-08 | End: 2023-09-08

## 2023-09-08 RX ORDER — OLANZAPINE 10 MG/1
10 INJECTION, POWDER, LYOPHILIZED, FOR SOLUTION INTRAMUSCULAR EVERY 8 HOURS PRN
Status: DISCONTINUED | OUTPATIENT
Start: 2023-09-08 | End: 2023-09-13

## 2023-09-08 RX ORDER — POLYETHYLENE GLYCOL 3350 17 G/17G
17 POWDER, FOR SOLUTION ORAL DAILY PRN
Status: DISCONTINUED | OUTPATIENT
Start: 2023-09-08 | End: 2023-09-14 | Stop reason: HOSPADM

## 2023-09-08 RX ORDER — LEVETIRACETAM 5 MG/ML
500 INJECTION INTRAVASCULAR 2 TIMES DAILY
Status: DISCONTINUED | OUTPATIENT
Start: 2023-09-08 | End: 2023-09-08

## 2023-09-08 RX ORDER — SODIUM CHLORIDE 9 MG/ML
INJECTION, SOLUTION INTRAVENOUS PRN
Status: DISCONTINUED | OUTPATIENT
Start: 2023-09-08 | End: 2023-09-14 | Stop reason: HOSPADM

## 2023-09-08 RX ORDER — SODIUM CHLORIDE 0.9 % (FLUSH) 0.9 %
5-40 SYRINGE (ML) INJECTION EVERY 12 HOURS SCHEDULED
Status: DISCONTINUED | OUTPATIENT
Start: 2023-09-08 | End: 2023-09-14 | Stop reason: HOSPADM

## 2023-09-08 RX ORDER — THIAMINE HYDROCHLORIDE 100 MG/ML
100 INJECTION, SOLUTION INTRAMUSCULAR; INTRAVENOUS DAILY
Status: DISCONTINUED | OUTPATIENT
Start: 2023-09-09 | End: 2023-09-14 | Stop reason: HOSPADM

## 2023-09-08 RX ORDER — SODIUM CHLORIDE 9 MG/ML
INJECTION, SOLUTION INTRAVENOUS PRN
Status: DISCONTINUED | OUTPATIENT
Start: 2023-09-08 | End: 2023-09-13

## 2023-09-08 RX ADMIN — LORAZEPAM 1 MG: 2 INJECTION INTRAMUSCULAR; INTRAVENOUS at 21:33

## 2023-09-08 RX ADMIN — GADOBENATE DIMEGLUMINE 17 ML: 529 INJECTION, SOLUTION INTRAVENOUS at 13:36

## 2023-09-08 RX ADMIN — SODIUM CHLORIDE 1000 ML: 9 INJECTION, SOLUTION INTRAVENOUS at 11:13

## 2023-09-08 RX ADMIN — ACYCLOVIR SODIUM 750 MG: 50 INJECTION, SOLUTION INTRAVENOUS at 20:14

## 2023-09-08 RX ADMIN — OLANZAPINE 5 MG: 10 INJECTION, POWDER, FOR SOLUTION INTRAMUSCULAR at 18:12

## 2023-09-08 RX ADMIN — LEVETIRACETAM 1000 MG: 10 INJECTION, SOLUTION INTRAVENOUS at 11:13

## 2023-09-08 RX ADMIN — LORAZEPAM 2 MG: 2 INJECTION INTRAMUSCULAR; INTRAVENOUS at 21:58

## 2023-09-08 RX ADMIN — LORAZEPAM 1 MG: 2 INJECTION INTRAMUSCULAR; INTRAVENOUS at 12:06

## 2023-09-08 RX ADMIN — ZIPRASIDONE MESYLATE 10 MG: 20 INJECTION, POWDER, LYOPHILIZED, FOR SOLUTION INTRAMUSCULAR at 15:22

## 2023-09-08 RX ADMIN — LORAZEPAM 2 MG: 2 INJECTION INTRAMUSCULAR; INTRAVENOUS at 10:47

## 2023-09-08 RX ADMIN — OLANZAPINE 10 MG: 10 INJECTION, POWDER, LYOPHILIZED, FOR SOLUTION INTRAMUSCULAR at 21:05

## 2023-09-08 RX ADMIN — LEVETIRACETAM 1000 MG: 10 INJECTION, SOLUTION INTRAVENOUS at 12:09

## 2023-09-08 ASSESSMENT — PAIN DESCRIPTION - ORIENTATION: ORIENTATION: LEFT

## 2023-09-08 ASSESSMENT — LIFESTYLE VARIABLES
HOW OFTEN DO YOU HAVE A DRINK CONTAINING ALCOHOL: MONTHLY OR LESS
HOW MANY STANDARD DRINKS CONTAINING ALCOHOL DO YOU HAVE ON A TYPICAL DAY: 1 OR 2

## 2023-09-08 ASSESSMENT — PAIN - FUNCTIONAL ASSESSMENT
PAIN_FUNCTIONAL_ASSESSMENT: PREVENTS OR INTERFERES SOME ACTIVE ACTIVITIES AND ADLS
PAIN_FUNCTIONAL_ASSESSMENT: NONE - DENIES PAIN

## 2023-09-08 ASSESSMENT — PAIN DESCRIPTION - DESCRIPTORS: DESCRIPTORS: BURNING;NUMBNESS;TINGLING

## 2023-09-08 ASSESSMENT — PAIN DESCRIPTION - LOCATION: LOCATION: ARM;LEG

## 2023-09-08 NOTE — ED PROVIDER NOTES
visualized paranasal sinuses and mastoid air cells demonstrate no acute abnormality. SOFT TISSUES/SKULL:  No acute abnormality of the visualized skull or soft tissues. No acute intracranial abnormality. MRI BRAIN W CONTRAST    Result Date: 9/7/2023  EXAMINATION: MRI OF THE BRAIN WITH CONTRAST  9/6/2023 3:32 pm TECHNIQUE: Multiplanar multisequence MRI of the head/brain was performed with the administration of intravenous contrast. COMPARISON: Imaging from earlier the same day. HISTORY: ORDERING SYSTEM PROVIDED HISTORY: seizure; prior MRI did not have contrast TECHNOLOGIST PROVIDED HISTORY: Reason for exam:->seizure; prior MRI did not have contrast Reason for Exam: Wanted MRi with contraast. WO contrast MRi done this morning FINDINGS: Only pre and postcontrast T1 weighted sequences were performed on the current exam.  These results should be interpreted in conjunction with the recent noncontrast MRI. The area of T2 hyperintensity in the posterior white matter of the right cerebral hemisphere predominantly in the parietal, temporal, occipital and insular regions does not demonstrate enhancement. On the previous exam, there was mild diffusion restriction associated with the lesion, particularly in the parietal region. T1 signal in this region is normal.     No enhancement associated with the right posterior hemisphere white matter lesion. 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 RECOMMENDATIONS: Consider follow-up MRI to evaluate for evolution of the lesion. XR CHEST PORTABLE    Result Date: 9/5/2023  EXAMINATION: ONE XRAY VIEW OF THE CHEST 9/5/2023 1:28 am COMPARISON: 02/10/2020 HISTORY: ORDERING SYSTEM PROVIDED HISTORY: seizure TECHNOLOGIST PROVIDED HISTORY: Reason for exam:->seizure Reason for Exam: Dizziness; Altered Mental Status/seizure FINDINGS: The lungs are without acute focal process. There is no effusion or pneumothorax.  The evaluation. FINDINGS: INTRACRANIAL STRUCTURES/VENTRICLES: There is no evidence of an acute infarct. There is abnormal T2 FLAIR hyperintensity with cortical thickening involving the right temporal lobe with the T2 FLAIR hyperintensity extending to involve the right parietal and occipital lobe white matter. No significant mass effect or midline shift. No evidence of an acute intracranial hemorrhage. Scattered foci of T2 FLAIR hyperintensity are seen in the periventricular and subcortical white matter, which are nonspecific, but may represent chronic microvascular ischemic change. No evidence of hydrocephalus. The normal signal voids within the major intracranial vessels appear maintained. ORBITS: The visualized portion of the orbits demonstrate no acute abnormality. SINUSES: The visualized paranasal sinuses and mastoid air cells demonstrate no acute abnormality. BONES/SOFT TISSUES: The bone marrow signal intensity appears normal. The soft tissues demonstrate no acute abnormality. 1. There is no evidence of an acute infarct. 2. T2 FLAIR hyperintensity with cortical thickening involving the right temporal lobe with the T2 FLAIR hyperintensity extending to involve the right parietal and occipital lobes. 3. No significant mass effect or midline shift. 4. Minimal chronic microvascular ischemic changes. These results were sent to the CORE Team on 9/6/2023 at 8:43 am to be communicated to the referring/covering health care provider/office. RECOMMENDATIONS: If there is clinical concern for herpes encephalitis, suggest empiric therapy with CSF sampling. Postcontrast MRI of the brain is also recommended. MDM:   Patient as above routine evaluated. She presents for seizure activity. Chart review from patient's recent admission [facility] was performed by myself and including his MRI was performed. He did have some activity in the temporal regions bilaterally.   Patient discharged without seizure medications

## 2023-09-08 NOTE — CONSULTS
Neurology Consult Note  Reason for Consult: seizures, abnormal MRI    Chief complaint: left sided numbness    Dr Nishant Frausto MD asked me to see Kristin Yin in consultation for evaluation of seizures, abnormal MRI    History of Present Illness:  Kristin Yin is a 47 y.o. male who presents with trouble using his left arm. I obtained my information via interview w/ the patient's fiance at the bedside, supplemented by chart review. The patient is dysarthric and very difficult to understand. The patient was evaluated at [facility] initially on 9/5 for an episode of LOC w/ mouth bleeding and tongue biting. Lactic acid was elevated. It was suspected he had new onset seizure. He underwent a non contrast MRI of his brain initially which showed a hyper intense lesion in the R hemisphere. No contrast enhancement on follow up scan. Clinically he was apparently doing well and he was not discharged on antiepileptic therapy. Follow up imaging in several weeks was recommended. His fiance tells me that she went to bed around 2330 last evening and the patient seemed to be doing fine. Around 1000 she noticed that he was having a lot of trouble using his LUE. Really couldn't pick anything up well. At the time he was talking OK. It appears he may have arrived to the ED a short time after 1030. I was called by the ED and told that the patient had a seizure w/ eye turning and head deviation. His mouth was twitching. He was given a total of 3 mg of lorazepam and 1g of Keppra. I was told that his mouth was continuing to twitch to recommended given another gram of Keppra and the twitching has since subsided. At the time of my evaluation, the patient is awake. There is no further seizure activity. He does, however, have significant slurring of his speech, L facial weakness, and some degree of LUE/LLE motor impairment.       Medical History:  Past Medical History:   Diagnosis Date nerves:   CN2: visual fields are challenging though no gross focal deficit. CN 3,4,6: extraocular muscles intact. Pupils are equal, round, reactive bilaterally. CN5: facial sensation symmetric   CN7: moderate L facial weakness w/ significant dysarthria. CN8: hearing grossly intact  CN11: trap full strength on shoulder shrug  CN12: tongue midline with protrusion  Strength: 4/5 LUE/LLE, 5/5 RUE/RLE. Sensory: the patient had reported some numbness on the L side though difficult to obtain details. Cerebellar/coordination: some clumsiness w/ FNF on the L. Tone: normal in all 4 extremities  Gait: deferred for safety. Labs  Glucose 141  Na 138  K 4.0  Cl 102  BUN 13  Cr 1.0  Ca 9.8    Lactic acid 1.8    ALT 19  AST 21    WBC 8.3K  Hg 17.6  Platelets 544    ETOH negative    Studies  MRI brain w/ 9/6/23, independently reviewed  No enhancement associated with the right posterior hemisphere white matter  lesion. Differential includes progressive multifocal leukoencephalopathy and  low-grade brain neoplasm. MRI brain w/o 9/6/23, independently reviewed  1. There is no evidence of an acute infarct. 2. T2 FLAIR hyperintensity with cortical thickening involving the right  temporal lobe with the T2 FLAIR hyperintensity extending to involve the right  parietal and occipital lobes. 3. No significant mass effect or midline shift. 4. Minimal chronic microvascular ischemic changes. These results were sent to the CORE Team on 9/6/2023 at 8:43 am to be  communicated to the referring/covering health care provider/office. Impression/Recommendations  Reported seizure. Dysarthria. L sided motor deficit. Recent abnormal brain MRI. He has already been loaded w/ Keppra. Start 500 mg BID. Clinically the patient has significant dysarthria and some degree of L sided deficit. His initial symptoms reported from his wife were LUE dysfunction.   While it is possible he may have been experiencing a

## 2023-09-08 NOTE — CARE COORDINATION
SW noting a possible admit in the ER. We will arrive at bedside momentarily to assess. Respectfully submitted,    Belem Jason MSW, [facility]   [phone]    Electronically signed by LYNDSAY Castrejon, LSW on 9/8/2023 at 12:04 PM

## 2023-09-08 NOTE — PROGRESS NOTES
Patient arrived to room 5102 via stretcher from ED at 1535. Patient alert and oriented x 3-4. Slurring speech. Will perform NIH as able as patient just received Geodon. Bedside telemetry applied to patient and verified normal sinus rhythm with CMU.   Electronically signed by Esther Barnhart RN on 9/8/2023 at 3:43 PM

## 2023-09-08 NOTE — ED PROVIDER NOTES
[address]\[address] [address]        Pt Name: Rick Sousa  MRN: [id number]  [address] [facility] 1970  Date of evaluation: 9/8/2023  Provider: SARBJIT Carballo  PCP: Miracle Paul  Note Started: 10:41 AM EDT 9/8/23       I have seen and evaluated this patient with my supervising physician Lauryn Larkin MD.      [address]       Chief Complaint   Patient presents with    Seizures     Pt d/c'd from [facility] yesterday after a two night visit for convulsions, pt denies seizure HX, denies any ETOH/benzo W/D or HX use. Pt's SO stated he had an episode en route, upon getting pt back into the ER, this RN witnessed another episode of approx. 1 minute. Head convulsions, eye deviate to the right side, speech becomes slurred, mouth droops to the left, drooling present and head shakes towards the left. HISTORY OF PRESENT ILLNESS: 1 or more Elements     History From: nursing staff    Rick Sousa is a 47 y.o. male who presents for seizure. Patient actively seizing. Nursing staff reports wife said he was discharged from [facility] yesterday after being admitted for seizures. Denies alcohol or benzo use. Nurses report he was talking but then head started shaking to the left, eyes deviated to the right, drooling. Last for 1 minute. Nursing Notes were reviewed and agreed with or any disagreements were addressed in the HPI. REVIEW OF SYSTEMS :      Review of Systems    Positives and Pertinent negatives as per HPI. SURGICAL HISTORY   History reviewed. No pertinent surgical history. CURRENTMEDICATIONS       Previous Medications    AMLODIPINE (NORVASC) 5 MG TABLET    Take 1 tablet by mouth daily       ALLERGIES     Iodine    FAMILYHISTORY     History reviewed. No pertinent family history.      SOCIAL HISTORY       Social History     Tobacco Use    Smoking status: Every Day     Types: Cigarettes     Last attempt to quit: 7/13/2010

## 2023-09-08 NOTE — CONSULTS
Infectious Diseases Inpatient Consult Note      Reason for Consult:  CHANGE in mentation, seizures and Abnormal Brain MRI with concern for Encephalitis     Requesting Physician:  Dereck Key     Primary Care Physician:  Joseph Meeks    History Obtained From:  Epic and Fiance     CHIEF COMPLAINT:     Chief Complaint   Patient presents with    Seizures     Pt d/c'd from [facility] yesterday after a two night visit for convulsions, pt denies seizure HX, denies any ETOH/benzo W/D or HX use. Pt's SO stated he had an episode en route, upon getting pt back into the ER, this RN witnessed another episode of approx. 1 minute. Head convulsions, eye deviate to the right side, speech becomes slurred, mouth droops to the left, drooling present and head shakes towards the left. HISTORY OF PRESENT ILLNESS:  47 y.o. man with HTN was admitted to [address] [place] [address] on 8/5 with seizure episode and had tongue bite and Lactic acid at 13 with change in mentation and was found down and brought in to hospital no warning signs no fevers and no head injury and recovered quickly as in patient and per d/c summary was back to base line mentation at d.c. He was in mild MARY GRACE with creat at 1.4, UDS + for cannabis . MRI with contrast onIMPRESSION:  No enhancement associated with the right posterior hemisphere white matter lesion. Differential includes progressive multifocal leukoencephalopathy and  low-grade brain neoplasm.: was reported and he was seen by Neurology with a plan for follow up as out patient. He now presented to ED here with another episode of seizure activity and per ED notes this was lasted for 1 minute and now he is more agitated and speech is slurred and able to follow simple commands and had some bleeding from the mouth from prior tongue bite. He has no fevers no chills no rash no exposures no sick contacts no travel and other wise History is all negative.  He had repeat MRI of the brain on 9/8 with enhancement of  RT cerebral intravenous contrast.     COMPARISON:  Imaging from earlier the same day. HISTORY:  ORDERING SYSTEM PROVIDED HISTORY: seizure; prior MRI did not have contrast  TECHNOLOGIST PROVIDED HISTORY:  Reason for exam:->seizure; prior MRI did not have contrast  Reason for Exam: Wanted MRi with contraast. WO contrast MRi done this morning     FINDINGS:  Only pre and postcontrast T1 weighted sequences were performed on the current  exam.  These results should be interpreted in conjunction with the recent  noncontrast MRI. The area of T2 hyperintensity in the posterior white matter of the right  cerebral hemisphere predominantly in the parietal, temporal, occipital and  insular regions does not demonstrate enhancement. On the previous exam,  there was mild diffusion restriction associated with the lesion, particularly  in the parietal region. T1 signal in this region is normal.     IMPRESSION:  No enhancement associated with the right posterior hemisphere white matter  lesion. Differential includes progressive multifocal leukoencephalopathy and  low-grade brain neoplasm. RECOMMENDATIONS:  Consider follow-up MRI to evaluate for evolution of the lesion. All pertinent images and reports for the current Hospitalization were reviewed by me.     IMPRESSION:    Patient Active Problem List   Diagnosis Code    New onset seizure ([address]) R56.9    Seizure ([address]) R56.9        ICD-10-CM    1. Seizure ([address])  R56.9          New onset of Seizures  Had loss of consciousness on 1st episode when admitted to [address] [place] [address]  Tongue bite++  Change in mentation  Mentation improved when d/c from [address] [place] [address]  Now admitted with in 24-48 hrs seizure episode again  No fevers  No SKIN lesions  MRI brain now reported to be abnormal with Rt cerebral hemisphere signal changes with some concern for HSV encephalitis  WBC normal   Lactic acidosis from seizure resolved    Mentation now some improvement and speech still slurred and some confusion and restlessness

## 2023-09-08 NOTE — PROGRESS NOTES
SLP NOTES    Order received; chart reviewed and patient discussed with nursing staff. Patient alert and confused and easily agitated at times; patient not managing own secretions and with actively open, bleeding lip and tongue lacerations s/p biting lips/tongue. ST to hold assessment at this time d/t current status with plan to re-attempt at a later date unless otherwise notified. Thank you. Stacey L. Clemon Severs, [place], [id number]  Speech-Language Pathologist  Portable phone: [phone]

## 2023-09-08 NOTE — PROGRESS NOTES
Pharmacy Medication Reconciliation Note     List of medications patient is currently taking is complete. Source of information:   1. Conversation with patient's family at bedside  2. EMR    Notes regarding home medications:   1. Patient did not take his amlodipine today before presenting to the ER. 2. Patient has tamsulosin at home but does not take regularly.       [facility] [facility]  9/8/2023 11:38 AM

## 2023-09-08 NOTE — H&P
History and Physical      Name:  Alexandra Rojas /Age/Sex: 1970  (47 y.o. male)   MRN & CSN:  [id number] & [id number] Admission Date/Time: 2023 10:27 AM   Location:  [address] [place] [address] PCP: Vilma Sanchez Day: 1    Assessment and Plan:   Alexandra Rojas is a 47 y.o.  male  who presents with Seizure ([address])    Assessment and plan:     Acute  Encephalopathy: Likely postictal confusion  New onset seizure  CT head was unremarkable, MRI brain with enhancement within the area of the right cerebral hemisphere  UA. TSH. Vitamin C00, Folic Acid  Start Keppra 5 mg IV twice daily  Lorazepam 2 grams IV as needed for seizure  Seizure precaution. Addendum: 438 PM    Infectious disease consulted for possible herpes encephalitis    Discussed assessment and treatment plan with the admitting and supervising physician who agrees with the plan below. Diet No diet orders on file   DVT Prophylaxis [] Lovenox, []  Heparin, [] SCDs, [] Warfarin  [] NOAC     GI Prophylaxis [] PPI,  [] H2 Blocker,  [] Carafate,  [] Diet/Tube Feeds   Code Status Prior     History of Present Illness:     Chief Complaint: Seizure ([address])  Alexandra Rojas is a 47 y.o.  male, with past medical history significant for hypertension presented hospital due to altered mental status. He was recently admitted to hospital on 2023 due to altered mental status concerning for seizure, syncope. He was also found to have acute kidney injury. He was evaluated by Neurology. MRI of the brain showed a normal FLAIR signal in the temporal region. He was discharged stable. He came back today due to altered mental status. Does not take his medications yet. No fever or chills. No nausea or vomiting. No cough, headache, lateralizing weakness or numbness. At the ED, initial blood pressure was 212/82. Work-up significant for bilirubin 1.5. Details unremarkable.   MRI of the brain with enhancement within the area of the right cerebral tablet by mouth daily 9/6/23   Barbi Worthington DO     Medications:    ziprasidone (GEODON) in sterile water injection  10 mg IntraMUSCular Once      Infusions:   PRN Meds:      Recent Labs     09/06/23  0700 09/07/23  0542 09/08/23  1050   WBC 10.9 9.5 8.3   HGB 15.3 15.7 17.6*   HCT 43.9 45.7 48.9    250 271      Recent Labs     09/06/23  0518 09/07/23  0542 09/08/23  1050    138 138   K 3.5 3.8 4.0    104 102   CO2 17* 23 23   PHOS 2.4* 3.5  --    BUN 8 8 13   CREATININE 0.9 1.0 1.0     Recent Labs     09/08/23  1050   AST 21   ALT 19   BILITOT 1.5*   ALKPHOS 111     No results for input(s): INR in the last 72 hours. Recent Labs     09/08/23  1050   CKTOTAL 178        Imaging reviewed  CT HEAD WO CONTRAST    Result Date: 9/8/2023  EXAMINATION: CT OF THE HEAD WITHOUT CONTRAST  9/8/2023 10:49 am TECHNIQUE: CT of the head was performed without the administration of intravenous contrast. Automated exposure control, iterative reconstruction, and/or weight based adjustment of the mA/kV was utilized to reduce the radiation dose to as low as reasonably achievable. COMPARISON: 09/05/2023 HISTORY: ORDERING SYSTEM PROVIDED HISTORY: seizure TECHNOLOGIST PROVIDED HISTORY: Has a \"code stroke\" or \"stroke alert\" been called? ->No Reason for exam:->seizure Decision Support Exception - unselect if not a suspected or confirmed emergency medical condition->Emergency Medical Condition (MA) Reason for Exam: ams, seizure. FINDINGS: BRAIN/VENTRICLES: Motion artifact degrades image quality. There is no acute intracerebral hemorrhage or extra-axial fluid collection. The ventricles and sulci are within normal limits. ORBITS: The orbits are unremarkable. SINUSES: Mild mucosal hypertrophy involves the right maxillary sinus. SOFT TISSUES/SKULL:  The calvarium is intact. 1. No acute intracranial abnormality.      CT Head W/O Contrast    Result Date: 9/5/2023  EXAMINATION: CT OF THE HEAD WITHOUT CONTRAST  9/5/2023 1:09 am

## 2023-09-08 NOTE — ED NOTES
Pt bit his tongue, is using suction to control bleeding and secretions. Unable to visualize injury in mouth due to patient not able to stop twitching.      Piotr Dalton RN  09/08/23 [id number]

## 2023-09-08 NOTE — PROGRESS NOTES
Call to IR to leave message regarding the need for a lumbar puncture tomorrow. Automatically directed call to x ray who states they will let the on call IR know.   Electronically signed by Dee John RN on 9/8/2023 at 7:50 PM

## 2023-09-08 NOTE — ED NOTES
Pt persistently getting out of bed, required 4 nurses to place back in bed. MD aware and ordering geodon.      Purvi Oliveira, RN  09/08/23 8781

## 2023-09-09 LAB
ALBUMIN SERPL-MCNC: 3.9 G/DL (ref 3.4–5)
ALBUMIN/GLOB SERPL: 1.4 {RATIO} (ref 1.1–2.2)
ALP SERPL-CCNC: 94 U/L (ref 40–129)
ALT SERPL-CCNC: 15 U/L (ref 10–40)
ANION GAP SERPL CALCULATED.3IONS-SCNC: 10 MMOL/L (ref 3–16)
AST SERPL-CCNC: 17 U/L (ref 15–37)
BASOPHILS # BLD: 0 K/UL (ref 0–0.2)
BASOPHILS NFR BLD: 0.4 %
BILIRUB SERPL-MCNC: 1.4 MG/DL (ref 0–1)
BUN SERPL-MCNC: 11 MG/DL (ref 7–20)
CALCIUM SERPL-MCNC: 9.2 MG/DL (ref 8.3–10.6)
CHLORIDE SERPL-SCNC: 107 MMOL/L (ref 99–110)
CHOLEST SERPL-MCNC: 137 MG/DL (ref 0–199)
CO2 SERPL-SCNC: 24 MMOL/L (ref 21–32)
CREAT SERPL-MCNC: 1 MG/DL (ref 0.9–1.3)
DEPRECATED RDW RBC AUTO: 12.4 % (ref 12.4–15.4)
EOSINOPHIL # BLD: 0.1 K/UL (ref 0–0.6)
EOSINOPHIL NFR BLD: 0.8 %
EST. AVERAGE GLUCOSE BLD GHB EST-MCNC: 96.8 MG/DL
GFR SERPLBLD CREATININE-BSD FMLA CKD-EPI: >60 ML/MIN/{1.73_M2}
GLUCOSE SERPL-MCNC: 131 MG/DL (ref 70–99)
HAV IGM SERPL QL IA: NORMAL
HBA1C MFR BLD: 5 %
HBV CORE IGM SERPL QL IA: NORMAL
HBV SURFACE AG SERPL QL IA: NORMAL
HCT VFR BLD AUTO: 41.2 % (ref 40.5–52.5)
HCV AB SERPL QL IA: NORMAL
HDLC SERPL-MCNC: 27 MG/DL (ref 40–60)
HGB BLD-MCNC: 14.5 G/DL (ref 13.5–17.5)
HIV 1+2 AB+HIV1 P24 AG SERPL QL IA: NORMAL
HIV 2 AB SERPL QL IA: NORMAL
HIV1 AB SERPL QL IA: NORMAL
HIV1 P24 AG SERPL QL IA: NORMAL
INR PPP: 1.06 (ref 0.84–1.16)
LDLC SERPL CALC-MCNC: 88 MG/DL
LYMPHOCYTES # BLD: 0.7 K/UL (ref 1–5.1)
LYMPHOCYTES NFR BLD: 9.4 %
MCH RBC QN AUTO: 31.9 PG (ref 26–34)
MCHC RBC AUTO-ENTMCNC: 35.1 G/DL (ref 31–36)
MCV RBC AUTO: 90.9 FL (ref 80–100)
MONOCYTES # BLD: 0.6 K/UL (ref 0–1.3)
MONOCYTES NFR BLD: 8.5 %
NEUTROPHILS # BLD: 6 K/UL (ref 1.7–7.7)
NEUTROPHILS NFR BLD: 80.9 %
PLATELET # BLD AUTO: 223 K/UL (ref 135–450)
PMV BLD AUTO: 7.1 FL (ref 5–10.5)
POTASSIUM SERPL-SCNC: 3.3 MMOL/L (ref 3.5–5.1)
PROT SERPL-MCNC: 6.6 G/DL (ref 6.4–8.2)
PROTHROMBIN TIME: 13.8 SEC (ref 11.5–14.8)
RBC # BLD AUTO: 4.54 M/UL (ref 4.2–5.9)
SODIUM SERPL-SCNC: 141 MMOL/L (ref 136–145)
TRIGL SERPL-MCNC: 108 MG/DL (ref 0–150)
VLDLC SERPL CALC-MCNC: 22 MG/DL
WBC # BLD AUTO: 7.4 K/UL (ref 4–11)

## 2023-09-09 PROCEDURE — 85610 PROTHROMBIN TIME: CPT

## 2023-09-09 PROCEDURE — 83036 HEMOGLOBIN GLYCOSYLATED A1C: CPT

## 2023-09-09 PROCEDURE — 6360000002 HC RX W HCPCS: Performed by: REGISTERED NURSE

## 2023-09-09 PROCEDURE — 86701 HIV-1ANTIBODY: CPT

## 2023-09-09 PROCEDURE — 2000000000 HC ICU R&B

## 2023-09-09 PROCEDURE — 80074 ACUTE HEPATITIS PANEL: CPT

## 2023-09-09 PROCEDURE — 2580000003 HC RX 258: Performed by: REGISTERED NURSE

## 2023-09-09 PROCEDURE — 80053 COMPREHEN METABOLIC PANEL: CPT

## 2023-09-09 PROCEDURE — 85025 COMPLETE CBC W/AUTO DIFF WBC: CPT

## 2023-09-09 PROCEDURE — 2500000003 HC RX 250 WO HCPCS: Performed by: REGISTERED NURSE

## 2023-09-09 PROCEDURE — 6370000000 HC RX 637 (ALT 250 FOR IP): Performed by: INTERNAL MEDICINE

## 2023-09-09 PROCEDURE — 9990000010 HC NO CHARGE VISIT: Performed by: PHYSICAL THERAPIST

## 2023-09-09 PROCEDURE — 86702 HIV-2 ANTIBODY: CPT

## 2023-09-09 PROCEDURE — 2580000003 HC RX 258: Performed by: INTERNAL MEDICINE

## 2023-09-09 PROCEDURE — 36415 COLL VENOUS BLD VENIPUNCTURE: CPT

## 2023-09-09 PROCEDURE — 94760 N-INVAS EAR/PLS OXIMETRY 1: CPT

## 2023-09-09 PROCEDURE — 6360000002 HC RX W HCPCS: Performed by: INTERNAL MEDICINE

## 2023-09-09 PROCEDURE — 80061 LIPID PANEL: CPT

## 2023-09-09 PROCEDURE — 87390 HIV-1 AG IA: CPT

## 2023-09-09 PROCEDURE — 2500000003 HC RX 250 WO HCPCS

## 2023-09-09 RX ORDER — SODIUM CHLORIDE AND POTASSIUM CHLORIDE 300; 900 MG/100ML; MG/100ML
INJECTION, SOLUTION INTRAVENOUS CONTINUOUS
Status: DISPENSED | OUTPATIENT
Start: 2023-09-09 | End: 2023-09-10

## 2023-09-09 RX ORDER — DEXMEDETOMIDINE HYDROCHLORIDE 4 UG/ML
INJECTION, SOLUTION INTRAVENOUS
Status: COMPLETED
Start: 2023-09-09 | End: 2023-09-09

## 2023-09-09 RX ORDER — POTASSIUM CHLORIDE 7.45 MG/ML
10 INJECTION INTRAVENOUS PRN
Status: DISCONTINUED | OUTPATIENT
Start: 2023-09-09 | End: 2023-09-13

## 2023-09-09 RX ORDER — POTASSIUM CHLORIDE 20 MEQ/1
40 TABLET, EXTENDED RELEASE ORAL PRN
Status: DISCONTINUED | OUTPATIENT
Start: 2023-09-09 | End: 2023-09-13

## 2023-09-09 RX ORDER — MAGNESIUM SULFATE 1 G/100ML
1000 INJECTION INTRAVENOUS PRN
Status: DISCONTINUED | OUTPATIENT
Start: 2023-09-09 | End: 2023-09-13

## 2023-09-09 RX ORDER — SODIUM CHLORIDE 9 MG/ML
INJECTION, SOLUTION INTRAVENOUS CONTINUOUS
Status: DISCONTINUED | OUTPATIENT
Start: 2023-09-09 | End: 2023-09-09

## 2023-09-09 RX ORDER — DEXMEDETOMIDINE HYDROCHLORIDE 4 UG/ML
.1-1.5 INJECTION, SOLUTION INTRAVENOUS CONTINUOUS
Status: DISCONTINUED | OUTPATIENT
Start: 2023-09-09 | End: 2023-09-13

## 2023-09-09 RX ADMIN — ZIPRASIDONE MESYLATE 15 MG: 20 INJECTION, POWDER, LYOPHILIZED, FOR SOLUTION INTRAMUSCULAR at 00:59

## 2023-09-09 RX ADMIN — LEVETIRACETAM 500 MG: 5 INJECTION, SOLUTION INTRAVENOUS at 21:30

## 2023-09-09 RX ADMIN — POTASSIUM CHLORIDE AND SODIUM CHLORIDE: 900; 300 INJECTION, SOLUTION INTRAVENOUS at 18:57

## 2023-09-09 RX ADMIN — POTASSIUM CHLORIDE AND SODIUM CHLORIDE: 900; 300 INJECTION, SOLUTION INTRAVENOUS at 08:41

## 2023-09-09 RX ADMIN — LEVETIRACETAM 500 MG: 5 INJECTION, SOLUTION INTRAVENOUS at 08:09

## 2023-09-09 RX ADMIN — THIAMINE HYDROCHLORIDE 100 MG: 100 INJECTION, SOLUTION INTRAMUSCULAR; INTRAVENOUS at 08:00

## 2023-09-09 RX ADMIN — ACYCLOVIR SODIUM 750 MG: 50 INJECTION, SOLUTION INTRAVENOUS at 14:37

## 2023-09-09 RX ADMIN — ROSUVASTATIN CALCIUM 40 MG: 40 TABLET, FILM COATED ORAL at 19:31

## 2023-09-09 RX ADMIN — SODIUM CHLORIDE, PRESERVATIVE FREE 10 ML: 5 INJECTION INTRAVENOUS at 07:59

## 2023-09-09 RX ADMIN — DEXMEDETOMIDINE HYDROCHLORIDE 0.2 MCG/KG/HR: 400 INJECTION, SOLUTION INTRAVENOUS at 03:14

## 2023-09-09 RX ADMIN — SODIUM CHLORIDE, PRESERVATIVE FREE 10 ML: 5 INJECTION INTRAVENOUS at 08:06

## 2023-09-09 RX ADMIN — DEXMEDETOMIDINE HYDROCHLORIDE 0.4 MCG/KG/HR: 400 INJECTION, SOLUTION INTRAVENOUS at 14:33

## 2023-09-09 RX ADMIN — ACYCLOVIR SODIUM 750 MG: 50 INJECTION, SOLUTION INTRAVENOUS at 04:55

## 2023-09-09 RX ADMIN — ACYCLOVIR SODIUM 750 MG: 50 INJECTION, SOLUTION INTRAVENOUS at 19:30

## 2023-09-09 RX ADMIN — SODIUM CHLORIDE: 9 INJECTION, SOLUTION INTRAVENOUS at 03:03

## 2023-09-09 RX ADMIN — LABETALOL HYDROCHLORIDE 10 MG: 5 INJECTION, SOLUTION INTRAVENOUS at 19:39

## 2023-09-09 ASSESSMENT — PAIN SCALES - GENERAL
PAINLEVEL_OUTOF10: 0

## 2023-09-09 NOTE — PLAN OF CARE
Problem: Discharge Planning  Goal: Discharge to home or other facility with appropriate resources  9/9/2023 0010 by Sravani iRvera RN  Outcome: Not Progressing  9/9/2023 0009 by Sravani Rivera RN  Outcome: Progressing  Flowsheets (Taken 9/8/2023 2349)  Discharge to home or other facility with appropriate resources: Identify barriers to discharge with patient and caregiver     Problem: Discharge Planning  Goal: Discharge to home or other facility with appropriate resources  9/9/2023 0010 by Sravani Rviera RN  Outcome: Not Progressing  9/9/2023 0009 by Sravani Rivera RN  Outcome: Progressing  Flowsheets (Taken 9/8/2023 2349)  Discharge to home or other facility with appropriate resources: Identify barriers to discharge with patient and caregiver

## 2023-09-09 NOTE — PLAN OF CARE
Problem: Discharge Planning  Goal: Discharge to home or other facility with appropriate resources  Outcome: Progressing     Problem: Pain  Goal: Verbalizes/displays adequate comfort level or baseline comfort level  Outcome: Progressing  Note: Patient was monitor for pain during this shift using numeric scale. Problem: Safety - Medical Restraint  Goal: Remains free of injury from restraints (Restraint for Interference with Medical Device)  Description: INTERVENTIONS:  1. Determine that other, less restrictive measures have been tried or would not be effective before applying the restraint  2. Evaluate the patient's condition at the time of restraint application  3. Inform patient/family regarding the reason for restraint  4. Q2H: Monitor safety, psychosocial status, comfort, nutrition and hydration  Outcome: Progressing  Flowsheets (Taken 9/9/2023 0254 by Gonzalo Angel RN)  Remains free of injury from restraints (restraint for interference with medical device):   Determine that other, less restrictive measures have been tried or would not be effective before applying the restraint   Evaluate the patient's condition at the time of restraint application   Inform patient/family regarding the reason for restraint   Every 2 hours: Monitor safety, psychosocial status, comfort, nutrition and hydration  Note: Patient on restraint for safety reasons. Problem: Safety - Adult  Goal: Free from fall injury  Outcome: Progressing     Problem: Skin/Tissue Integrity  Goal: Absence of new skin breakdown  Description: 1. Monitor for areas of redness and/or skin breakdown  2. Assess vascular access sites hourly  3. Every 4-6 hours minimum:  Change oxygen saturation probe site  4. Every 4-6 hours:  If on nasal continuous positive airway pressure, respiratory therapy assess nares and determine need for appliance change or resting period.   Outcome: Progressing

## 2023-09-09 NOTE — PROGRESS NOTES
4 Eyes Skin Assessment     NAME:  Nkechi Jenkins  YOB: 1970  MEDICAL RECORD NUMBER:  [id number]    The patient is being assessed for  Transfer to New Unit    I agree that at least one RN has performed a thorough Head to Toe Skin Assessment on the patient. ALL assessment sites listed below have been assessed. Areas assessed by both nurses:    Head, Face, Ears, Shoulders, Back, Chest, Arms, Elbows, Hands, Sacrum. Buttock, Coccyx, Ischium, Legs. Feet and Heels, and Under Medical Devices         Does the Patient have a Wound?  No noted wound(s)       Kendell Prevention initiated by RN: Yes  Wound Care Orders initiated by RN: No    Pressure Injury (Stage 3,4, Unstageable, DTI, NWPT, and Complex wounds) if present, place Wound referral order by RN under : No    New Ostomies, if present place, Ostomy referral order under : No     Nurse 1 eSignature: Electronically signed by Kay Serna RN on 9/9/23 at 6:20 AM EDT    **SHARE this note so that the co-signing nurse can place an eSignature**    Nurse 2 eSignature: Electronically signed by Che Elliott RN on 9/9/23 at 6:21 AM EDT

## 2023-09-09 NOTE — PROGRESS NOTES
Code violet initiated for this patient d/t combative with staff expressing how he wants to leave. This nurse and fellow nursing staff continuously attempted to re-orientated and calm patient down. Patient tried to hit staff in the face and continued to be verbally aggressive to staff. During, patient tore out his right forearm IV. 4 point restraint was applied for patients safety and staff safety. Plan of care still ongoing.

## 2023-09-09 NOTE — PROGRESS NOTES
After responding to a code violet on this patient  I called his patient contact Rocio Agustín to let her know that this patient was getting combative with the staff because he wants to leave. She stated that he did the same thing in  ER. She was made aware that we had to be put in restraints. This patient has no insight of why he is hospitalized and could not answer simple orientation questions. Patient was put is in 4 point soft restraints.

## 2023-09-09 NOTE — PROGRESS NOTES
Speech Therapy note regarding SLP Evaluation and treatment orders  Chart review: DON held 9/8/20923 due to agitation/medical issues    Chart reviewed and SLP discussed with RN  Hold 9/9/2023 am as pt with continued agitation and is in 5 point restraints. Plan: re-check with RN 9/9/2023 afternoon    Signed  Anjana Charlton MS,CCC,SLP [id number]  Speech and Language Pathologist  9/9/2023 at 02511 am

## 2023-09-09 NOTE — PROGRESS NOTES
[employer] LETY Scanner Results  Pt was scanned according to 's recommendations. Other       Site Reading Redness noted? Y/N   Right heel 0.5 Y   Left heel  0.4 Y   Sacrum 0.5 N       [x]  LETY Delta score < 0.6 indicates normal, healthy tissue at this time. Continue to assess daily and implement routine pressure injury prevention measures using the Kendell Order Set. Scan weekly on Wednesday. [] LETY Delta score > or = to 0.6 indicates at risk tissue. Implement target prevention interventions below and scan daily.     [x] Kendell orders reviewed and updated if indicated  [x] Educated patient/family on importance of offloading/repositioning  [x] Patient agreeable to plan for pressure offloading/repositioning    Positioning wedge, Extra pillows, and Heel boot(s)      [] Nutrition consult made  [x] Nutrition consult not indicated at this time     [x] Sacral foam border in place  [] Sacral foam border not in place, barrier cream applied    [x] Low air loss mattress (or Isoflex blue/orange mattress) ordered/placed   [x] For heels, apply liquid skin barrier twice daily and ensure offloading with pillows or boots    Every two hour repositioning, Offload heels from bed using bed pillows or heel boots, Ensure patient received adequate nutrition, and Encourage mobility

## 2023-09-09 NOTE — PROGRESS NOTES
4 Eyes Skin Assessment     NAME:  Mala Starks  YOB: 1970  MEDICAL RECORD NUMBER:  [id number]    The patient is being assessed for  Shift Handoff    I agree that at least one RN has performed a thorough Head to Toe Skin Assessment on the patient. ALL assessment sites listed below have been assessed. Areas assessed by both nurses:    Head, Face, Ears, Shoulders, Back, Chest, Arms, Elbows, Hands, Sacrum. Buttock, Coccyx, Ischium, Legs. Feet and Heels, and Under Medical Devices         Does the Patient have a Wound?  No noted wound(s)       Kendell Prevention initiated by RN: No  Wound Care Orders initiated by RN: No    Pressure Injury (Stage 3,4, Unstageable, DTI, NWPT, and Complex wounds) if present, place Wound referral order by RN under : No    New Ostomies, if present place, Ostomy referral order under : No     Nurse 1 eSignature: Electronically signed by Manuel Ramirez RN on 9/9/23 at 6:34 AM EDT    **SHARE this note so that the co-signing nurse can place an eSignature**    Nurse 2 eSignature: {Esignature:[id number]}

## 2023-09-09 NOTE — PROGRESS NOTES
Telephone report received from Maddie Remy on PCU. Pt to room 2125. Connected to ICU monitor. VSS. Pt in four point restraints. Pt only oriented to self and verbally aggressive towards RNs. [address] [facility] Yousif NP at bedside to place new orders. Posey vest applied. Precedex gtt initiated.

## 2023-09-09 NOTE — PROGRESS NOTES
Report called and given to [facility] on ICU floor. Patient transferred to 2125 via bed. No further questions from nurse at this time. Veronica Mejia RN.

## 2023-09-10 LAB
ALBUMIN SERPL-MCNC: 3.5 G/DL (ref 3.4–5)
ALBUMIN/GLOB SERPL: 1.4 {RATIO} (ref 1.1–2.2)
ALP SERPL-CCNC: 88 U/L (ref 40–129)
ALT SERPL-CCNC: 13 U/L (ref 10–40)
ANION GAP SERPL CALCULATED.3IONS-SCNC: 10 MMOL/L (ref 3–16)
ANION GAP SERPL CALCULATED.3IONS-SCNC: 11 MMOL/L (ref 3–16)
AST SERPL-CCNC: 21 U/L (ref 15–37)
BACTERIA URNS QL MICRO: NORMAL /HPF
BASOPHILS # BLD: 0 K/UL (ref 0–0.2)
BASOPHILS NFR BLD: 0.5 %
BILIRUB SERPL-MCNC: 1.2 MG/DL (ref 0–1)
BILIRUB UR QL STRIP.AUTO: NEGATIVE
BUN SERPL-MCNC: 18 MG/DL (ref 7–20)
BUN SERPL-MCNC: 21 MG/DL (ref 7–20)
CALCIUM SERPL-MCNC: 8.8 MG/DL (ref 8.3–10.6)
CALCIUM SERPL-MCNC: 9.2 MG/DL (ref 8.3–10.6)
CHLORIDE SERPL-SCNC: 113 MMOL/L (ref 99–110)
CHLORIDE SERPL-SCNC: 114 MMOL/L (ref 99–110)
CK SERPL-CCNC: 378 U/L (ref 39–308)
CLARITY UR: CLEAR
CO2 SERPL-SCNC: 19 MMOL/L (ref 21–32)
CO2 SERPL-SCNC: 20 MMOL/L (ref 21–32)
COLOR UR: YELLOW
CREAT SERPL-MCNC: 2.9 MG/DL (ref 0.9–1.3)
CREAT SERPL-MCNC: 3 MG/DL (ref 0.9–1.3)
DEPRECATED RDW RBC AUTO: 12.3 % (ref 12.4–15.4)
EOSINOPHIL # BLD: 0.1 K/UL (ref 0–0.6)
EOSINOPHIL NFR BLD: 0.9 %
EPI CELLS #/AREA URNS AUTO: 0 /HPF (ref 0–5)
GFR SERPLBLD CREATININE-BSD FMLA CKD-EPI: 24 ML/MIN/{1.73_M2}
GFR SERPLBLD CREATININE-BSD FMLA CKD-EPI: 25 ML/MIN/{1.73_M2}
GLUCOSE BLD-MCNC: 83 MG/DL (ref 70–99)
GLUCOSE SERPL-MCNC: 149 MG/DL (ref 70–99)
GLUCOSE SERPL-MCNC: 93 MG/DL (ref 70–99)
GLUCOSE UR STRIP.AUTO-MCNC: NEGATIVE MG/DL
HCT VFR BLD AUTO: 40.6 % (ref 40.5–52.5)
HGB BLD-MCNC: 14.3 G/DL (ref 13.5–17.5)
HGB UR QL STRIP.AUTO: ABNORMAL
HYALINE CASTS #/AREA URNS AUTO: 1 /LPF (ref 0–8)
KETONES UR STRIP.AUTO-MCNC: NEGATIVE MG/DL
LEUKOCYTE ESTERASE UR QL STRIP.AUTO: NEGATIVE
LYMPHOCYTES # BLD: 0.8 K/UL (ref 1–5.1)
LYMPHOCYTES NFR BLD: 10.5 %
MAGNESIUM SERPL-MCNC: 2 MG/DL (ref 1.8–2.4)
MCH RBC QN AUTO: 32 PG (ref 26–34)
MCHC RBC AUTO-ENTMCNC: 35.3 G/DL (ref 31–36)
MCV RBC AUTO: 90.7 FL (ref 80–100)
MONOCYTES # BLD: 0.8 K/UL (ref 0–1.3)
MONOCYTES NFR BLD: 10.4 %
NEUTROPHILS # BLD: 5.9 K/UL (ref 1.7–7.7)
NEUTROPHILS NFR BLD: 77.7 %
NITRITE UR QL STRIP.AUTO: NEGATIVE
PERFORMED ON: NORMAL
PH UR STRIP.AUTO: 5.5 [PH] (ref 5–8)
PHOSPHATE SERPL-MCNC: 3.7 MG/DL (ref 2.5–4.9)
PLATELET # BLD AUTO: 249 K/UL (ref 135–450)
PMV BLD AUTO: 7.6 FL (ref 5–10.5)
POTASSIUM SERPL-SCNC: 3.8 MMOL/L (ref 3.5–5.1)
POTASSIUM SERPL-SCNC: 4.4 MMOL/L (ref 3.5–5.1)
PROT SERPL-MCNC: 6 G/DL (ref 6.4–8.2)
PROT UR STRIP.AUTO-MCNC: NEGATIVE MG/DL
RBC # BLD AUTO: 4.48 M/UL (ref 4.2–5.9)
RBC CLUMPS #/AREA URNS AUTO: 1 /HPF (ref 0–4)
REPORT: NORMAL
RESP PATH DNA+RNA PNL NPH NAA+NON-PROBE: NORMAL
SODIUM SERPL-SCNC: 143 MMOL/L (ref 136–145)
SODIUM SERPL-SCNC: 144 MMOL/L (ref 136–145)
SP GR UR STRIP.AUTO: 1.01 (ref 1–1.03)
UA DIPSTICK W REFLEX MICRO PNL UR: YES
URN SPEC COLLECT METH UR: ABNORMAL
UROBILINOGEN UR STRIP-ACNC: 0.2 E.U./DL
WBC # BLD AUTO: 7.6 K/UL (ref 4–11)
WBC #/AREA URNS AUTO: 1 /HPF (ref 0–5)

## 2023-09-10 PROCEDURE — 6370000000 HC RX 637 (ALT 250 FOR IP): Performed by: REGISTERED NURSE

## 2023-09-10 PROCEDURE — 2580000003 HC RX 258: Performed by: INTERNAL MEDICINE

## 2023-09-10 PROCEDURE — 94760 N-INVAS EAR/PLS OXIMETRY 1: CPT

## 2023-09-10 PROCEDURE — 81001 URINALYSIS AUTO W/SCOPE: CPT

## 2023-09-10 PROCEDURE — 2060000000 HC ICU INTERMEDIATE R&B

## 2023-09-10 PROCEDURE — 6360000002 HC RX W HCPCS: Performed by: REGISTERED NURSE

## 2023-09-10 PROCEDURE — 6360000002 HC RX W HCPCS: Performed by: INTERNAL MEDICINE

## 2023-09-10 PROCEDURE — 6370000000 HC RX 637 (ALT 250 FOR IP): Performed by: INTERNAL MEDICINE

## 2023-09-10 PROCEDURE — 2580000003 HC RX 258: Performed by: REGISTERED NURSE

## 2023-09-10 PROCEDURE — 82550 ASSAY OF CK (CPK): CPT

## 2023-09-10 PROCEDURE — 2500000003 HC RX 250 WO HCPCS: Performed by: REGISTERED NURSE

## 2023-09-10 PROCEDURE — 83735 ASSAY OF MAGNESIUM: CPT

## 2023-09-10 PROCEDURE — 84100 ASSAY OF PHOSPHORUS: CPT

## 2023-09-10 PROCEDURE — 85025 COMPLETE CBC W/AUTO DIFF WBC: CPT

## 2023-09-10 PROCEDURE — 80053 COMPREHEN METABOLIC PANEL: CPT

## 2023-09-10 PROCEDURE — 0202U NFCT DS 22 TRGT SARS-COV-2: CPT

## 2023-09-10 PROCEDURE — 36415 COLL VENOUS BLD VENIPUNCTURE: CPT

## 2023-09-10 RX ORDER — HYDRALAZINE HYDROCHLORIDE 20 MG/ML
5 INJECTION INTRAMUSCULAR; INTRAVENOUS EVERY 4 HOURS PRN
Status: DISCONTINUED | OUTPATIENT
Start: 2023-09-10 | End: 2023-09-13

## 2023-09-10 RX ORDER — CLONIDINE HYDROCHLORIDE 0.1 MG/1
0.1 TABLET ORAL 2 TIMES DAILY
Status: DISCONTINUED | OUTPATIENT
Start: 2023-09-10 | End: 2023-09-13

## 2023-09-10 RX ORDER — SODIUM CHLORIDE 9 MG/ML
INJECTION, SOLUTION INTRAVENOUS CONTINUOUS
Status: DISCONTINUED | OUTPATIENT
Start: 2023-09-10 | End: 2023-09-13

## 2023-09-10 RX ORDER — AMLODIPINE BESYLATE 5 MG/1
5 TABLET ORAL DAILY
Status: DISCONTINUED | OUTPATIENT
Start: 2023-09-10 | End: 2023-09-11

## 2023-09-10 RX ORDER — SODIUM CHLORIDE 9 MG/ML
INJECTION, SOLUTION INTRAVENOUS CONTINUOUS
Status: DISCONTINUED | OUTPATIENT
Start: 2023-09-10 | End: 2023-09-10

## 2023-09-10 RX ORDER — LABETALOL HYDROCHLORIDE 5 MG/ML
10 INJECTION, SOLUTION INTRAVENOUS EVERY 4 HOURS PRN
Status: DISCONTINUED | OUTPATIENT
Start: 2023-09-10 | End: 2023-09-14 | Stop reason: HOSPADM

## 2023-09-10 RX ADMIN — SODIUM CHLORIDE, PRESERVATIVE FREE 10 ML: 5 INJECTION INTRAVENOUS at 20:46

## 2023-09-10 RX ADMIN — LEVETIRACETAM 500 MG: 5 INJECTION, SOLUTION INTRAVENOUS at 20:50

## 2023-09-10 RX ADMIN — LABETALOL HYDROCHLORIDE 10 MG: 5 INJECTION, SOLUTION INTRAVENOUS at 16:17

## 2023-09-10 RX ADMIN — SODIUM CHLORIDE: 9 INJECTION, SOLUTION INTRAVENOUS at 10:46

## 2023-09-10 RX ADMIN — POTASSIUM CHLORIDE AND SODIUM CHLORIDE: 900; 300 INJECTION, SOLUTION INTRAVENOUS at 05:27

## 2023-09-10 RX ADMIN — SODIUM CHLORIDE, PRESERVATIVE FREE 10 ML: 5 INJECTION INTRAVENOUS at 08:09

## 2023-09-10 RX ADMIN — ACYCLOVIR SODIUM 750 MG: 50 INJECTION, SOLUTION INTRAVENOUS at 04:26

## 2023-09-10 RX ADMIN — AMLODIPINE BESYLATE 5 MG: 5 TABLET ORAL at 12:49

## 2023-09-10 RX ADMIN — CLONIDINE HYDROCHLORIDE 0.1 MG: 0.1 TABLET ORAL at 22:51

## 2023-09-10 RX ADMIN — LEVETIRACETAM 500 MG: 5 INJECTION, SOLUTION INTRAVENOUS at 08:23

## 2023-09-10 RX ADMIN — DEXMEDETOMIDINE HYDROCHLORIDE 0.4 MCG/KG/HR: 400 INJECTION, SOLUTION INTRAVENOUS at 03:55

## 2023-09-10 RX ADMIN — LABETALOL HYDROCHLORIDE 10 MG: 5 INJECTION, SOLUTION INTRAVENOUS at 20:47

## 2023-09-10 RX ADMIN — ROSUVASTATIN CALCIUM 40 MG: 40 TABLET, FILM COATED ORAL at 20:46

## 2023-09-10 RX ADMIN — SODIUM CHLORIDE: 9 INJECTION, SOLUTION INTRAVENOUS at 17:21

## 2023-09-10 RX ADMIN — SODIUM CHLORIDE: 9 INJECTION, SOLUTION INTRAVENOUS at 10:17

## 2023-09-10 RX ADMIN — THIAMINE HYDROCHLORIDE 100 MG: 100 INJECTION, SOLUTION INTRAMUSCULAR; INTRAVENOUS at 08:08

## 2023-09-10 ASSESSMENT — PAIN SCALES - GENERAL
PAINLEVEL_OUTOF10: 0

## 2023-09-10 NOTE — CONSULTS
Interval History and plan:        Creatinine 3, CO2 20, GFR of 24 mm/min, potassium 4.4  Making good amount of urine  Getting multiple blood pressure medication for hypertension  Has not got acyclovir IV today  Getting IV fluids continue aggressive hydration to help renal recovery from acyclovir effect  Would bring the blood pressure down but not lower than 792 systolic                   Assessment :     Acute Kidney Injury  Likely due to acyclovir which has been is stopped at the time of consultation  Also blood pressure went down to as low as 94 systolic on 9/6/18659-  From 2012 on presentation  Creatinine 3 on 9/10/2023  Also variable blood pressure which could be contributory  Given that he has seizure checked for CPK- unlikely to be contributing to MARY GRACE  UA-trace blood otherwise bland  Renal ultrasound- pending    Hypertension   BP: (138-186)/()  Pulse:  []   BP goal inpatient [phone] systolic inpatient      Seizure  Altered mental status    [facility] would like to thank Eb Mendoza MD   for opportunity to serve this patient      Please call with questions at-   24 Hrs Answering service [phone] or  7 am- 5 pm via Perfect serve or cell phone  Nisreen Dao MD     HPI :     Leopoldo Finn is a 47 y.o. male presented to   the hospital on 9/8/2023 with seizure and confusion. He is not known to have seizures CT scan of the brain was done which was negative. He had MRI done which showed  Within the area of the right cerebral hemisphere. He was started on antiseizure medication. Also infectious disease physician was consulted for possible herpetic  Encephalitis. He was treated with IV acyclovir. His mental status is getting better. In the meantime he is noted to have high creatinine because of which we are consulted.       PMH/PSH/SH/Family History:     Past Medical History:   Diagnosis Date    Hypertension           Medication:   Current Facility-Administered

## 2023-09-10 NOTE — PROGRESS NOTES
This RN assumed care of patient from 1300 to 1900 today. Handoff report received from [employer]. All questions answered at this time. Upon review of patient chart, noticed pt to be hypertensive with SBP 170s-180s. Secure Message sent to Dr. Laureano Arana. New order for labetalol PRN and Amlodipine daily (see MAR). Pt also cleared for Regular diet. Upon entering room to introduce myself to pt, noticed pt had nicotine dip in his mouth. Told pt that nicotine is prohibited in the hospital and pt cannot have that in the room. nAtonella took packet of dip, placed in her pocket and stated she will take it home. Offered to get a nicotine patch ordered for pt if needed. Pt states \"I understand and I apologize. I don't need a nicotine patch\". Pt spit out dip and placed in trash.      Electronically signed by Ata Zabala RN on 9/10/2023 at 1:07 PM

## 2023-09-10 NOTE — PROGRESS NOTES
Hospital Medicine Progress Note     Date:  9/10/2023    PCP: Antony Ruiz (Tel: [phone])    Date of Admission: 9/8/2023    Chief complaint:   Chief Complaint   Patient presents with    Seizures     Pt d/c'd from [facility] yesterday after a two night visit for convulsions, pt denies seizure HX, denies any ETOH/benzo W/D or HX use. Pt's SO stated he had an episode en route, upon getting pt back into the ER, this RN witnessed another episode of approx. 1 minute. Head convulsions, eye deviate to the right side, speech becomes slurred, mouth droops to the left, drooling present and head shakes towards the left. Brief admission history: 60-year-old male with history of essential hypertension, marijuana use, tobacco use disorder, who presented to the emergency room for evaluation of confusion. He was recently admitted on 9/5/2023 following presentation with syncopal episode, at the time, there was suspicion for possible seizure. MRI-brain without contrast was concerning for T2 flair hyperintensity with cortical thickening involving the right temporal lobe extending to involve the right parietal occipital lobes. 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 He came back to the emergency room on 9/8/2023 with convulsions, with 1 episode reported by significant other, and another witnessed episode in the emergency room, after which he became confused. A repeat MRI-brain with and without contrast obtained on 9/8/2023 demonstrates enhancement within the area of right cerebral hemisphere signal abnormality, concerning for herpes encephalitis and other forms of acute encephalitis. Assessment/plan:  Generalized tonic-clonic seizure. Concerning for possible encephalitis, see below. For now, continue antiseizure medications.

## 2023-09-10 NOTE — PROGRESS NOTES
Pt hypertensive throughout shift despite starting on Amlodipine 5 mg daily. Pressure at 1617 was 180/97. PRN Labetalol given (see MAR). 1630: Repeat /92. Pt resting comfortably in bed watching TV.      Electronically signed by David Harding RN on 9/10/2023 at 5:03 PM

## 2023-09-10 NOTE — PLAN OF CARE
Problem: Discharge Planning  Goal: Discharge to home or other facility with appropriate resources  9/9/2023 2029 by Tori Douglas RN  Outcome: Progressing  Flowsheets (Taken 9/9/2023 1929)  Discharge to home or other facility with appropriate resources: Identify barriers to discharge with patient and caregiver  9/9/2023 1447 by Kelle Jordan RN  Outcome: Progressing     Problem: Pain  Goal: Verbalizes/displays adequate comfort level or baseline comfort level  9/9/2023 2029 by Tori Douglas RN  Outcome: Progressing  Flowsheets (Taken 9/9/2023 1929)  Verbalizes/displays adequate comfort level or baseline comfort level: Encourage patient to monitor pain and request assistance  9/9/2023 1447 by Kelle Jordan RN  Outcome: Progressing  Note: Patient was monitor for pain during this shift using numeric scale. Problem: Safety - Medical Restraint  Goal: Remains free of injury from restraints (Restraint for Interference with Medical Device)  Description: INTERVENTIONS:  1. Determine that other, less restrictive measures have been tried or would not be effective before applying the restraint  2. Evaluate the patient's condition at the time of restraint application  3. Inform patient/family regarding the reason for restraint  4.  Q2H: Monitor safety, psychosocial status, comfort, nutrition and hydration  9/9/2023 2029 by Tori Douglas RN  Outcome: Progressing  9/9/2023 1447 by Kelle Jordan RN  Outcome: Progressing  Flowsheets (Taken 9/9/2023 0254 by Melvin Byrd RN)  Remains free of injury from restraints (restraint for interference with medical device):   Determine that other, less restrictive measures have been tried or would not be effective before applying the restraint   Evaluate the patient's condition at the time of restraint application   Inform patient/family regarding the reason for restraint   Every 2 hours: Monitor safety, psychosocial status, comfort, nutrition and

## 2023-09-10 NOTE — PROGRESS NOTES
4 Eyes Skin Assessment     NAME:  Rico Sotelo  YOB: 1970  MEDICAL RECORD NUMBER:  [id number]    The patient is being assessed for  Shift Handoff    I agree that at least one RN has performed a thorough Head to Toe Skin Assessment on the patient. ALL assessment sites listed below have been assessed. Areas assessed by both nurses:    Head, Face, Ears, Shoulders, Back, Chest, Arms, Elbows, Hands, Sacrum. Buttock, Coccyx, Ischium, Legs. Feet and Heels, and Under Medical Devices         Does the Patient have a Wound?  No noted wound(s)       Kendell Prevention initiated by RN: Yes  Wound Care Orders initiated by RN: No    Pressure Injury (Stage 3,4, Unstageable, DTI, NWPT, and Complex wounds) if present, place Wound referral order by RN under : No    New Ostomies, if present place, Ostomy referral order under : No     Nurse 1 eSignature: Electronically signed by Spencer Pugh RN on 9/10/23 at 6:33 PM EDT    **SHARE this note so that the co-signing nurse can place an eSignature**    Nurse 2 eSignature: {Esignature:[id number]}

## 2023-09-11 ENCOUNTER — APPOINTMENT (OUTPATIENT)
Dept: ULTRASOUND IMAGING | Age: 53
DRG: 101 | End: 2023-09-11
Payer: COMMERCIAL

## 2023-09-11 ENCOUNTER — APPOINTMENT (OUTPATIENT)
Dept: INTERVENTIONAL RADIOLOGY/VASCULAR | Age: 53
DRG: 101 | End: 2023-09-11
Payer: COMMERCIAL

## 2023-09-11 LAB
ALBUMIN SERPL-MCNC: 3.4 G/DL (ref 3.4–5)
ALBUMIN/GLOB SERPL: 1.5 {RATIO} (ref 1.1–2.2)
ALP SERPL-CCNC: 83 U/L (ref 40–129)
ALT SERPL-CCNC: 13 U/L (ref 10–40)
ANION GAP SERPL CALCULATED.3IONS-SCNC: 9 MMOL/L (ref 3–16)
APPEARANCE CSF: CLEAR
AST SERPL-CCNC: 15 U/L (ref 15–37)
BASOPHILS # BLD: 0.1 K/UL (ref 0–0.2)
BASOPHILS NFR BLD: 1.1 %
BILIRUB SERPL-MCNC: 1 MG/DL (ref 0–1)
BUN SERPL-MCNC: 14 MG/DL (ref 7–20)
CALCIUM SERPL-MCNC: 8.8 MG/DL (ref 8.3–10.6)
CHLORIDE SERPL-SCNC: 114 MMOL/L (ref 99–110)
CLOT EVALUATION CSF: NORMAL
CO2 SERPL-SCNC: 21 MMOL/L (ref 21–32)
COLOR CSF: COLORLESS
CREAT SERPL-MCNC: 2.5 MG/DL (ref 0.9–1.3)
DEPRECATED RDW RBC AUTO: 12.9 % (ref 12.4–15.4)
EKG ATRIAL RATE: 86 BPM
EKG DIAGNOSIS: NORMAL
EKG P AXIS: 63 DEGREES
EKG P-R INTERVAL: 112 MS
EKG Q-T INTERVAL: 356 MS
EKG QRS DURATION: 92 MS
EKG QTC CALCULATION (BAZETT): 426 MS
EKG R AXIS: 55 DEGREES
EKG T AXIS: 64 DEGREES
EKG VENTRICULAR RATE: 86 BPM
EOSINOPHIL # BLD: 0.2 K/UL (ref 0–0.6)
EOSINOPHIL NFR BLD: 3.1 %
GFR SERPLBLD CREATININE-BSD FMLA CKD-EPI: 30 ML/MIN/{1.73_M2}
GLUCOSE CSF-MCNC: 72 MG/DL (ref 40–80)
GLUCOSE SERPL-MCNC: 108 MG/DL (ref 70–99)
HCT VFR BLD AUTO: 38.7 % (ref 40.5–52.5)
HGB BLD-MCNC: 13.6 G/DL (ref 13.5–17.5)
LYMPHOCYTES # BLD: 1 K/UL (ref 1–5.1)
LYMPHOCYTES NFR BLD: 14.7 %
MAGNESIUM SERPL-MCNC: 1.7 MG/DL (ref 1.8–2.4)
MANUAL DIF COMMENT CSF-IMP: NORMAL
MCH RBC QN AUTO: 31.9 PG (ref 26–34)
MCHC RBC AUTO-ENTMCNC: 35.1 G/DL (ref 31–36)
MCV RBC AUTO: 90.9 FL (ref 80–100)
MENING+ENC PNL CSF NAA+NON-PROBE: NORMAL
MONOCYTES # BLD: 0.8 K/UL (ref 0–1.3)
MONOCYTES NFR BLD: 11.5 %
NEUTROPHILS # BLD: 4.9 K/UL (ref 1.7–7.7)
NEUTROPHILS NFR BLD: 69.6 %
NUC CELL # FLD MANUAL: 1 /CUMM (ref 0–5)
PHOSPHATE SERPL-MCNC: 2.8 MG/DL (ref 2.5–4.9)
PLATELET # BLD AUTO: 237 K/UL (ref 135–450)
PMV BLD AUTO: 7.2 FL (ref 5–10.5)
POTASSIUM SERPL-SCNC: 3.9 MMOL/L (ref 3.5–5.1)
PROT SERPL-MCNC: 5.7 G/DL (ref 6.4–8.2)
RBC # BLD AUTO: 4.26 M/UL (ref 4.2–5.9)
RBC # FLD MANUAL: 0 /CUMM
REPORT: NORMAL
SODIUM SERPL-SCNC: 144 MMOL/L (ref 136–145)
TUBE # CSF: NORMAL
WBC # BLD AUTO: 7.1 K/UL (ref 4–11)

## 2023-09-11 PROCEDURE — 97530 THERAPEUTIC ACTIVITIES: CPT

## 2023-09-11 PROCEDURE — 82945 GLUCOSE OTHER FLUID: CPT

## 2023-09-11 PROCEDURE — 87070 CULTURE OTHR SPECIMN AEROBIC: CPT

## 2023-09-11 PROCEDURE — 2709999900 IR LUMBAR PUNCTURE FOR DIAGNOSIS

## 2023-09-11 PROCEDURE — 36415 COLL VENOUS BLD VENIPUNCTURE: CPT

## 2023-09-11 PROCEDURE — 2580000003 HC RX 258: Performed by: INTERNAL MEDICINE

## 2023-09-11 PROCEDURE — 86341 ISLET CELL ANTIBODY: CPT

## 2023-09-11 PROCEDURE — 87205 SMEAR GRAM STAIN: CPT

## 2023-09-11 PROCEDURE — 62328 DX LMBR SPI PNXR W/FLUOR/CT: CPT

## 2023-09-11 PROCEDURE — 88112 CYTOPATH CELL ENHANCE TECH: CPT

## 2023-09-11 PROCEDURE — 6360000002 HC RX W HCPCS: Performed by: INTERNAL MEDICINE

## 2023-09-11 PROCEDURE — 92610 EVALUATE SWALLOWING FUNCTION: CPT

## 2023-09-11 PROCEDURE — 2580000003 HC RX 258: Performed by: REGISTERED NURSE

## 2023-09-11 PROCEDURE — 97165 OT EVAL LOW COMPLEX 30 MIN: CPT

## 2023-09-11 PROCEDURE — 85025 COMPLETE CBC W/AUTO DIFF WBC: CPT

## 2023-09-11 PROCEDURE — 99233 SBSQ HOSP IP/OBS HIGH 50: CPT | Performed by: INTERNAL MEDICINE

## 2023-09-11 PROCEDURE — 94760 N-INVAS EAR/PLS OXIMETRY 1: CPT

## 2023-09-11 PROCEDURE — 76770 US EXAM ABDO BACK WALL COMP: CPT

## 2023-09-11 PROCEDURE — 87483 CNS DNA AMP PROBE TYPE 12-25: CPT

## 2023-09-11 PROCEDURE — 80053 COMPREHEN METABOLIC PANEL: CPT

## 2023-09-11 PROCEDURE — 83735 ASSAY OF MAGNESIUM: CPT

## 2023-09-11 PROCEDURE — 86255 FLUORESCENT ANTIBODY SCREEN: CPT

## 2023-09-11 PROCEDURE — 6370000000 HC RX 637 (ALT 250 FOR IP): Performed by: NURSE PRACTITIONER

## 2023-09-11 PROCEDURE — 93010 ELECTROCARDIOGRAM REPORT: CPT | Performed by: INTERNAL MEDICINE

## 2023-09-11 PROCEDURE — 6370000000 HC RX 637 (ALT 250 FOR IP): Performed by: INTERNAL MEDICINE

## 2023-09-11 PROCEDURE — 2060000000 HC ICU INTERMEDIATE R&B

## 2023-09-11 PROCEDURE — 00JU3ZZ INSPECTION OF SPINAL CANAL, PERCUTANEOUS APPROACH: ICD-10-PCS | Performed by: PSYCHIATRY & NEUROLOGY

## 2023-09-11 PROCEDURE — 84157 ASSAY OF PROTEIN OTHER: CPT

## 2023-09-11 PROCEDURE — 97161 PT EVAL LOW COMPLEX 20 MIN: CPT

## 2023-09-11 PROCEDURE — 6360000002 HC RX W HCPCS: Performed by: REGISTERED NURSE

## 2023-09-11 PROCEDURE — 87899 AGENT NOS ASSAY W/OPTIC: CPT

## 2023-09-11 PROCEDURE — 84100 ASSAY OF PHOSPHORUS: CPT

## 2023-09-11 RX ORDER — ACETAMINOPHEN 325 MG/1
650 TABLET ORAL EVERY 4 HOURS PRN
Status: DISCONTINUED | OUTPATIENT
Start: 2023-09-11 | End: 2023-09-14 | Stop reason: HOSPADM

## 2023-09-11 RX ORDER — MAGNESIUM SULFATE IN WATER 40 MG/ML
2000 INJECTION, SOLUTION INTRAVENOUS ONCE
Status: COMPLETED | OUTPATIENT
Start: 2023-09-11 | End: 2023-09-11

## 2023-09-11 RX ORDER — NIFEDIPINE 30 MG/1
90 TABLET, EXTENDED RELEASE ORAL DAILY
Status: DISCONTINUED | OUTPATIENT
Start: 2023-09-11 | End: 2023-09-14 | Stop reason: HOSPADM

## 2023-09-11 RX ORDER — AMLODIPINE BESYLATE 10 MG/1
10 TABLET ORAL DAILY
Status: DISCONTINUED | OUTPATIENT
Start: 2023-09-11 | End: 2023-09-11

## 2023-09-11 RX ADMIN — SODIUM CHLORIDE: 9 INJECTION, SOLUTION INTRAVENOUS at 13:15

## 2023-09-11 RX ADMIN — MAGNESIUM SULFATE HEPTAHYDRATE 2000 MG: 40 INJECTION, SOLUTION INTRAVENOUS at 10:07

## 2023-09-11 RX ADMIN — LEVETIRACETAM 500 MG: 5 INJECTION, SOLUTION INTRAVENOUS at 21:15

## 2023-09-11 RX ADMIN — ACETAMINOPHEN 650 MG: 325 TABLET ORAL at 20:22

## 2023-09-11 RX ADMIN — SODIUM CHLORIDE: 9 INJECTION, SOLUTION INTRAVENOUS at 20:25

## 2023-09-11 RX ADMIN — LEVETIRACETAM 500 MG: 5 INJECTION, SOLUTION INTRAVENOUS at 09:56

## 2023-09-11 RX ADMIN — THIAMINE HYDROCHLORIDE 100 MG: 100 INJECTION, SOLUTION INTRAMUSCULAR; INTRAVENOUS at 09:59

## 2023-09-11 RX ADMIN — ROSUVASTATIN CALCIUM 40 MG: 40 TABLET, FILM COATED ORAL at 20:22

## 2023-09-11 RX ADMIN — NIFEDIPINE 90 MG: 30 TABLET, FILM COATED, EXTENDED RELEASE ORAL at 10:02

## 2023-09-11 RX ADMIN — CLONIDINE HYDROCHLORIDE 0.1 MG: 0.1 TABLET ORAL at 10:00

## 2023-09-11 RX ADMIN — SODIUM CHLORIDE, PRESERVATIVE FREE 10 ML: 5 INJECTION INTRAVENOUS at 20:05

## 2023-09-11 RX ADMIN — SODIUM CHLORIDE, PRESERVATIVE FREE 10 ML: 5 INJECTION INTRAVENOUS at 10:00

## 2023-09-11 RX ADMIN — SODIUM CHLORIDE, PRESERVATIVE FREE 10 ML: 5 INJECTION INTRAVENOUS at 21:11

## 2023-09-11 RX ADMIN — CLONIDINE HYDROCHLORIDE 0.1 MG: 0.1 TABLET ORAL at 20:22

## 2023-09-11 ASSESSMENT — PAIN SCALES - GENERAL: PAINLEVEL_OUTOF10: 3

## 2023-09-11 ASSESSMENT — PAIN DESCRIPTION - LOCATION: LOCATION: HEAD

## 2023-09-11 NOTE — CARE COORDINATION
Case Management Assessment  Initial Evaluation    Date/Time of Evaluation: 9/11/2023 11:28 AM  Assessment Completed by: LYNDSAY Fuller, KUSUM    If patient is discharged prior to next notation, then this note serves as note for discharge by case management. Patient Name: Reji Chaves                   YOB: 1970  Diagnosis: Seizure [facility]) [R56.9]                   Date / Time: 9/8/2023 10:27 AM    Patient Admission Status: Inpatient   Readmission Risk (Low < 19, Mod (19-27), High > 27): Readmission Risk Score: 14.9    [facility]  PCP verified by CM? Yes    Chart Reviewed: Yes      History Provided by: Patient  Patient Orientation: Alert and Oriented    Patient Cognition: Alert    Hospitalization in the last 30 days (Readmission):  Yes    If yes, Readmission Assessment in CM Navigator will be completed. Advance Directives:      Code Status: Full Code   Patient's Primary Decision Maker is: Legal Next of Kin      Discharge Planning:    Patient lives with: Spouse/Significant Other, Children, Other (Comment) (1 dog.) Type of Home: House  Primary Care Giver: Self  Patient Support Systems include: Spouse/Significant Other, Children, Family Members   Current Financial resources: Medicaid  Current community resources: None  Current services prior to admission: None            Current DME:              Type of Home Care services:  None    ADLS  Prior functional level: Independent in ADLs/IADLs  Current functional level: Independent in ADLs/IADLs    PT AM-PAC:   /24  OT AM-PAC:   /24    Family can provide assistance at DC: Yes  Would you like Case Management to discuss the discharge plan with any other family members/significant others, and if so, who? No  Plans to Return to Present Housing: Yes  Other Identified Issues/Barriers to RETURNING to current housing: None noted at this time. Potential Assistance needed at discharge:  Other (Comment) (Alcoholism treatment.)            Potential

## 2023-09-11 NOTE — PLAN OF CARE
Problem: Discharge Planning  Goal: Discharge to home or other facility with appropriate resources  Outcome: Progressing  Flowsheets (Taken 9/11/2023 0797)  Discharge to home or other facility with appropriate resources:   Identify barriers to discharge with patient and caregiver   Arrange for needed discharge resources and transportation as appropriate   Identify discharge learning needs (meds, wound care, etc)   Refer to discharge planning if patient needs post-hospital services based on physician order or complex needs related to functional status, cognitive ability or social support system     Problem: Pain  Goal: Verbalizes/displays adequate comfort level or baseline comfort level  Outcome: Progressing  Flowsheets (Taken 9/11/2023 0901)  Verbalizes/displays adequate comfort level or baseline comfort level:   Encourage patient to monitor pain and request assistance   Assess pain using appropriate pain scale   Administer analgesics based on type and severity of pain and evaluate response   Implement non-pharmacological measures as appropriate and evaluate response     Problem: Skin/Tissue Integrity  Goal: Absence of new skin breakdown  Description: 1. Monitor for areas of redness and/or skin breakdown  2. Assess vascular access sites hourly  3. Every 4-6 hours minimum:  Change oxygen saturation probe site  4. Every 4-6 hours:  If on nasal continuous positive airway pressure, respiratory therapy assess nares and determine need for appliance change or resting period.   Outcome: Progressing     Electronically signed by Benny Little RN on 9/11/2023 at 10:41 AM

## 2023-09-11 NOTE — PROGRESS NOTES
Facility/Department: [address] ICU  Initial Assessment  DYSPHAGIA BEDSIDE SWALLOW EVALUATION     Patient: Andrew Hammer   : 1970   MRN: [id number]      Evaluation Date: 2023   Admitting Diagnosis: Seizure [facility]) [R56.9]  Pain: Did not state                                                         CHART REVIEW:  2023 admitted with c/o seizures  MD ADMISSION H&P HPI:  Andrew Hammer is a 47 y.o.  male, with past medical history significant for hypertension presented hospital due to altered mental status. He was recently admitted to hospital on 2023 due to altered mental status concerning for seizure, syncope. He was also found to have acute kidney injury. He was evaluated by Neurology. MRI of the brain showed a normal FLAIR signal in the temporal region. He was discharged stable. He came back today due to altered mental status. Does not take his medications yet. No fever or chills. No nausea or vomiting. No cough, headache, lateralizing weakness or numbness. At the ED, initial blood pressure was 212/82. Work-up significant for bilirubin 1.5. Details unremarkable. MRI of the brain with enhancement within the area of the right cerebral hemisphere. He was given Ativan, Keppra, IV fluid and Geodon. Ten point ROS reviewed negative, unless as noted above    Neuro consult and notes reviewed    2023: transferred to ICU    IMAGING:  CXR: 2023  IMPRESSION:  No acute process. CT HEAD: 2023  IMPRESSION:  1. No acute intracranial abnormality. BRAIN MRI: 2023  IMPRESSION:  Enhancement within the area of right cerebral hemisphere signal abnormality. Differential continues to favor herpes encephalitis and other forms of acute  encephalitis.       Current Diet Level (prior to evaluation): Regular texture diet  Thin liquids    Respiratory Status:   [x]Room Air   []O2 via nasal cannula   []Other:    Reason for referral: SLP evaluation orders received due to altered mental

## 2023-09-11 NOTE — PROGRESS NOTES
Interval History and plan: On IV fluids 150 mill per hour  CO2 better at 21  Creatinine 2.5  Made at least 2.7 L of urine yesterday  Does not have Mckeon  Blood pressure continues to vary  Still very high today  Start on nifedipine which is more potent than amlodipine  Also would continue on aggressive hydration to avoid crystallization of the psych reviewed which was given to him few days ago  If the renal function is significantly better getting better by tomorrow need to decrease the fluid rate                     Assessment :     Acute Kidney Injury  Likely due to acyclovir which has been is stopped at the time of consultation  Also blood pressure went down to as low as 94 systolic on 2/4/96580-  From 2012 on presentation  Creatinine 3 on 9/10/2023  Also variable blood pressure which could be contributory  Given that he has seizure checked for CPK- unlikely to be contributing to MARY GRACE  UA-trace blood otherwise bland  Renal ultrasound- pending    Hypertension   BP: (144-181)/()  Pulse:  [70-83]   BP goal inpatient 018-520 systolic inpatient      Seizure  Altered mental status    [facility] would like to thank Axel Lujan MD   for opportunity to serve this patient      Please call with questions at-   24 Hrs Answering service [phone] or  7 am- 5 pm via Perfect serve or cell phone  Fred Pandey MD     HPI :     Eric Arciniega is a 47 y.o. male presented to   the hospital on 9/8/2023 with seizure and confusion. He is not known to have seizures CT scan of the brain was done which was negative. He had MRI done which showed  Within the area of the right cerebral hemisphere. He was started on antiseizure medication. Also infectious disease physician was consulted for possible herpetic  Encephalitis. He was treated with IV acyclovir. His mental status is getting better.   In the meantime he is noted to have high creatinine because of which we are

## 2023-09-11 NOTE — PROGRESS NOTES
Patient returned to room via stretcher. IVF infusing without difficulty. Band aid intact to mid lower back, no drainage noted. Denies any c/o pain or discomfort at present time.      .Electronically signed by Luis Rosario RN on 9/11/2023 at 4:04 PM

## 2023-09-11 NOTE — PROGRESS NOTES
Physical Therapy  PT referral received and chart reviewed. Pt transfer to ICU 9/9/2023; please reconsult PT when approp.   Agnes Mei, PT

## 2023-09-11 NOTE — PROGRESS NOTES
Physical Therapy  Facility/Department: [facility] 6U ICU  Physical Therapy Initial Assessment/Discharge Summary    Name: Joao Carmona  : 1970  MRN: [id number]  Date of Service: 2023    Discharge Recommendations:  Home with assist PRN   PT Equipment Recommendations  Equipment Needed: No          Assessment   Assessment: 48 y/o male admit 2023 with Acute Encephalopathy, Seizure, H/O ETOH. MRI : Enhance within area of R Cerebral Hemisphere Signal Abnormal, concern for Herpes Encephalopathy and other forms of Encephalopathy. PTA pt living with wife/dtr in 1 story home with level entry/access; independent daily care and functional mobility/working. Pt currently alert/cooperative with PT Eval; thasi oob/amb functional distances without LOB or deviations. Pt denies any d/c concerns. PT to sign off at this time. Therapy Prognosis: Good  Decision Making: Low Complexity  History: 48 y/o male admit 2023 with Acute Encephalopathy, Seizure, H/O ETOH. MRI : Enhance within area of R Cerebral Hemisphere Signal Abnormal, concern for Herpes Encephalopathy and other forms of Encephalopathy. Exam: See above. Clinical Presentation: See above. Barriers to Learning: None. Requires PT Follow-Up: No  Activity Tolerance  Activity Tolerance: Patient tolerated treatment well     Plan   Physcial Therapy Plan  General Plan: Discharge with evaluation only  Safety Devices  Type of Devices: Bed alarm in place, Call light within reach, Left in bed, Nurse notified     Restrictions  Restrictions/Precautions  Restrictions/Precautions: Up as Tolerated     Subjective   General  Chart Reviewed: Yes  Patient assessed for rehabilitation services?: Yes  Additional Pertinent Hx: 48 y/o male admit 2023 with Acute Encephalopathy, Seizure, H/O ETOH. MRI : Enhance within area of R Cerebral Hemisphere Signal Abnormal, concern for Herpes Encephalopathy and other forms of Encephalopathy.    Family / Caregiver Present: No  Referring Practitioner: Dr. Rodger Lyons: Within Functional Limits  Subjective  Subjective: Pt agreeable to PT Eval/Rx. Social/Functional History  Social/Functional History  Lives With: Spouse, Daughter (7 y/o dtr)  Type of Home: House  Home Layout: One level  Home Access: Level entry  Bathroom Shower/Tub: Walk-in shower  Bathroom Toilet: Standard  Bathroom Accessibility: Accessible  Home Equipment: None  Has the patient had two or more falls in the past year or any fall with injury in the past year?: No  ADL Assistance: Independent  Homemaking Assistance: Independent  Ambulation Assistance: Independent (Without assist device pta.)  Transfer Assistance: Independent  Active : Yes  Occupation: Full time employment  Type of Occupation: [employer]  Vision/Hearing  Vision  Vision: Within Functional Limits  Hearing  Hearing: Within functional limits    Cognition   Orientation  Overall Orientation Status: Within Functional Limits  Cognition  Overall Cognitive Status: WFL     Objective           Gross Assessment  AROM: Within functional limits  Strength: Within functional limits                    Bed mobility  Supine to Sit: Independent     Ambulation  Surface: Level tile  Device: No Device  Distance: Pt amb within hospital room setting, additonal 200' without assist device Independently. No LE buckling/giving way; no specific deviations or LOB. AM-PAC Score  AM-PAC Inpatient Mobility Raw Score : 24 (09/11/23 1212)  AM-PAC Inpatient T-Scale Score : 61.14 (09/11/23 1212)  Mobility Inpatient CMS 0-100% Score: 0 (09/11/23 1212)  Mobility Inpatient CMS G-Code Modifier : [address] [id number] [facility] (09/11/23 1212)          Goals  Short Term Goals  Time Frame for Short Term Goals: No Acute Care PT Goals Identified. Patient Goals   Patient Goals : Return home.        Education  Patient Education  Education Given To: Patient  Education Provided Comments: Role of PT, POC, Need to call for assist.  Education

## 2023-09-11 NOTE — PROGRESS NOTES
Occupational Therapy  Facility/Department: [facility] 3W ICU  Occupational Therapy Initial Assessment    Name: Hansa Valadez  : 1970  MRN: [id number]  Date of Service: 2023    Discharge Recommendations:  Home independently  OT Equipment Recommendations  Equipment Needed: No     Hansa Valadez scored a 24/24 on the AM-PAC ADL Inpatient form. At this time, no further OT is recommended upon discharge. Recommend patient returns to prior setting with prior services. Patient Diagnosis(es): The encounter diagnosis was Seizure [facility]). Past Medical History:  has a past medical history of Hypertension. Past Surgical History:  has no past surgical history on file. Assessment   Assessment: 46 y/o male admit 2023 with Acute Encephalopathy, Seizure, H/O ETOH. MRI : Enhance within area of R Cerebral Hemisphere Signal Abnormal, concern for Herpes Encephalopathy and other forms of Encephalopathy. PTA pt lives at home with family and was independent with ADLs and functional mobility. Today, pt completed ADL transfers and functional mobility around room and unit independently. Pt declined ADLs but denied concerns- anticipate will be ind. Pt safe to return home and no further OT warranted. Prognosis: Good  Decision Making: Low Complexity  No Skilled OT: At baseline function; Safe to return home  REQUIRES OT FOLLOW-UP: No  Activity Tolerance  Activity Tolerance: Patient Tolerated treatment well        Plan   Occupational Therapy Plan  Times Per Week: dc acute ot     Restrictions  Restrictions/Precautions  Restrictions/Precautions: Up as Tolerated    Subjective   General  Chart Reviewed: Yes  Patient assessed for rehabilitation services?: Yes  Additional Pertinent Hx: 46 y/o male admit 2023 with Acute Encephalopathy, Seizure, H/O ETOH. MRI : Enhance within area of R Cerebral Hemisphere Signal Abnormal, concern for Herpes Encephalopathy and other forms of Encephalopathy.   Family / Caregiver Present: No  Referring Practitioner: Gisele Coffman MD  Subjective  Subjective: Pt seen bedside and agreeable to therapy. General Comment  Comments: Per RN ok for therapy     Social/Functional History  Social/Functional History  Lives With: Spouse, Daughter (4 y/o dtr)  Type of Home: House  Home Layout: One level  Home Access: Level entry  Bathroom Shower/Tub: Walk-in shower  Bathroom Toilet: Standard  Bathroom Accessibility: Accessible  Home Equipment: None  Has the patient had two or more falls in the past year or any fall with injury in the past year?: No  ADL Assistance: Independent  Homemaking Assistance: Independent  Ambulation Assistance: Independent (Without assist device pta.)  Transfer Assistance: Independent  Active : Yes  Occupation: Full time employment  Type of Occupation: [employer]       Objective   Safety Devices  Type of Devices: Bed alarm in place;Call light within reach; Left in bed;Nurse notified           ADL  Additional Comments: Declined ADLs 2/2 already completing but reports no concerns. Anticipate will be ind for all ADLs     Activity Tolerance  Activity Tolerance: Patient tolerated treatment well    Bed mobility  Supine to Sit: Independent  Sit to Supine: Independent    Transfers  Sit to stand: Independent  Stand to sit: Independent  Transfer Comments: Pt ambulated around room and around unit independently.     Vision  Vision: Within Functional Limits  Hearing  Hearing: Within functional limits    Cognition  Overall Cognitive Status: WFL  Orientation  Overall Orientation Status: Within Functional Limits       Education Given To: Patient  Education Provided: Role of Therapy;Transfer Training;Plan of Care  Education Method: Verbal;Demonstration  Barriers to Learning: None  Education Outcome: Verbalized understanding;Demonstrated understanding    LUE AROM (degrees)  LUE AROM : WFL  Left Hand AROM (degrees)  Left Hand AROM: WFL  RUE AROM (degrees)  RUE AROM : WFL  Right Hand AROM (degrees)  Right

## 2023-09-11 NOTE — CARE COORDINATION
09/11/23 1125   Readmission Assessment   Number of Days since last admission? 1-7 days   Previous Disposition Home with Family   Who is being Interviewed Patient   What was the patient's/caregiver's perception as to why they think they needed to return back to the hospital? Other (Comment)  (not feeling well, knew something was wrong.)   Did you visit your Primary Care Physician after you left the hospital, before you returned this time? Yes   Did you see a specialist, such as Cardiac, Pulmonary, Orthopedic Physician, etc. after you left the hospital? No   Who advised the patient to return to the hospital? Self-referral   Does the patient report anything that got in the way of taking their medications? No   In our efforts to provide the best possible care to you and others like you, can you think of anything that we could have done to help you after you left the hospital the first time, so that you might not have needed to return so soon? Other (Comment)  (should've kept me another day.)       Respectfully submitted,    Belem UMANA, [facility]   [phone]  Electronically signed by LYNDSAY Malhotra, LSW on 9/11/2023 at 11:25 AM

## 2023-09-12 LAB
ALBUMIN SERPL-MCNC: 4.2 G/DL (ref 3.4–5)
ALBUMIN/GLOB SERPL: 1.4 {RATIO} (ref 1.1–2.2)
ALP SERPL-CCNC: 105 U/L (ref 40–129)
ALT SERPL-CCNC: 16 U/L (ref 10–40)
ANION GAP SERPL CALCULATED.3IONS-SCNC: 10 MMOL/L (ref 3–16)
AST SERPL-CCNC: 16 U/L (ref 15–37)
BACTERIA CSF CULT: NORMAL
BASOPHILS # BLD: 0.1 K/UL (ref 0–0.2)
BASOPHILS NFR BLD: 0.9 %
BILIRUB SERPL-MCNC: 1.1 MG/DL (ref 0–1)
BUN SERPL-MCNC: 8 MG/DL (ref 7–20)
CALCIUM SERPL-MCNC: 9.5 MG/DL (ref 8.3–10.6)
CHLORIDE SERPL-SCNC: 108 MMOL/L (ref 99–110)
CO2 SERPL-SCNC: 24 MMOL/L (ref 21–32)
CREAT SERPL-MCNC: 1.9 MG/DL (ref 0.9–1.3)
CRYPTOC AG CSF QL: NEGATIVE
DEPRECATED RDW RBC AUTO: 12.7 % (ref 12.4–15.4)
EOSINOPHIL # BLD: 0.2 K/UL (ref 0–0.6)
EOSINOPHIL NFR BLD: 4.1 %
GFR SERPLBLD CREATININE-BSD FMLA CKD-EPI: 42 ML/MIN/{1.73_M2}
GLUCOSE SERPL-MCNC: 107 MG/DL (ref 70–99)
GRAM STN SPEC: NORMAL
HCT VFR BLD AUTO: 44.6 % (ref 40.5–52.5)
HGB BLD-MCNC: 15.9 G/DL (ref 13.5–17.5)
LYMPHOCYTES # BLD: 1.2 K/UL (ref 1–5.1)
LYMPHOCYTES NFR BLD: 20.6 %
MAGNESIUM SERPL-MCNC: 2 MG/DL (ref 1.8–2.4)
MCH RBC QN AUTO: 31.8 PG (ref 26–34)
MCHC RBC AUTO-ENTMCNC: 35.7 G/DL (ref 31–36)
MCV RBC AUTO: 89.1 FL (ref 80–100)
MONOCYTES # BLD: 0.6 K/UL (ref 0–1.3)
MONOCYTES NFR BLD: 11 %
NEUTROPHILS # BLD: 3.6 K/UL (ref 1.7–7.7)
NEUTROPHILS NFR BLD: 63.4 %
PHOSPHATE SERPL-MCNC: 3.7 MG/DL (ref 2.5–4.9)
PLATELET # BLD AUTO: 267 K/UL (ref 135–450)
PMV BLD AUTO: 7.4 FL (ref 5–10.5)
POTASSIUM SERPL-SCNC: 3.9 MMOL/L (ref 3.5–5.1)
PROT CSF-MCNC: 24 MG/DL (ref 15–45)
PROT SERPL-MCNC: 7.1 G/DL (ref 6.4–8.2)
RBC # BLD AUTO: 5.01 M/UL (ref 4.2–5.9)
SODIUM SERPL-SCNC: 142 MMOL/L (ref 136–145)
WBC # BLD AUTO: 5.6 K/UL (ref 4–11)

## 2023-09-12 PROCEDURE — 6370000000 HC RX 637 (ALT 250 FOR IP): Performed by: INTERNAL MEDICINE

## 2023-09-12 PROCEDURE — 2580000003 HC RX 258: Performed by: REGISTERED NURSE

## 2023-09-12 PROCEDURE — 86694 HERPES SIMPLEX NES ANTBDY: CPT

## 2023-09-12 PROCEDURE — 84100 ASSAY OF PHOSPHORUS: CPT

## 2023-09-12 PROCEDURE — 80053 COMPREHEN METABOLIC PANEL: CPT

## 2023-09-12 PROCEDURE — 94760 N-INVAS EAR/PLS OXIMETRY 1: CPT

## 2023-09-12 PROCEDURE — 86696 HERPES SIMPLEX TYPE 2 TEST: CPT

## 2023-09-12 PROCEDURE — 99233 SBSQ HOSP IP/OBS HIGH 50: CPT | Performed by: INTERNAL MEDICINE

## 2023-09-12 PROCEDURE — 92526 ORAL FUNCTION THERAPY: CPT

## 2023-09-12 PROCEDURE — 6360000002 HC RX W HCPCS: Performed by: INTERNAL MEDICINE

## 2023-09-12 PROCEDURE — 2060000000 HC ICU INTERMEDIATE R&B

## 2023-09-12 PROCEDURE — 6370000000 HC RX 637 (ALT 250 FOR IP): Performed by: NURSE PRACTITIONER

## 2023-09-12 PROCEDURE — 86695 HERPES SIMPLEX TYPE 1 TEST: CPT

## 2023-09-12 PROCEDURE — 6360000002 HC RX W HCPCS: Performed by: REGISTERED NURSE

## 2023-09-12 PROCEDURE — 85025 COMPLETE CBC W/AUTO DIFF WBC: CPT

## 2023-09-12 PROCEDURE — 2580000003 HC RX 258: Performed by: INTERNAL MEDICINE

## 2023-09-12 PROCEDURE — 36415 COLL VENOUS BLD VENIPUNCTURE: CPT

## 2023-09-12 PROCEDURE — 83735 ASSAY OF MAGNESIUM: CPT

## 2023-09-12 RX ADMIN — SODIUM CHLORIDE: 9 INJECTION, SOLUTION INTRAVENOUS at 03:24

## 2023-09-12 RX ADMIN — NIFEDIPINE 90 MG: 30 TABLET, FILM COATED, EXTENDED RELEASE ORAL at 09:50

## 2023-09-12 RX ADMIN — ROSUVASTATIN CALCIUM 40 MG: 40 TABLET, FILM COATED ORAL at 20:30

## 2023-09-12 RX ADMIN — LEVETIRACETAM 500 MG: 5 INJECTION, SOLUTION INTRAVENOUS at 09:53

## 2023-09-12 RX ADMIN — LEVETIRACETAM 500 MG: 5 INJECTION, SOLUTION INTRAVENOUS at 20:32

## 2023-09-12 RX ADMIN — SODIUM CHLORIDE: 9 INJECTION, SOLUTION INTRAVENOUS at 11:16

## 2023-09-12 RX ADMIN — THIAMINE HYDROCHLORIDE 100 MG: 100 INJECTION, SOLUTION INTRAMUSCULAR; INTRAVENOUS at 09:50

## 2023-09-12 RX ADMIN — SODIUM CHLORIDE, PRESERVATIVE FREE 10 ML: 5 INJECTION INTRAVENOUS at 09:52

## 2023-09-12 RX ADMIN — CLONIDINE HYDROCHLORIDE 0.1 MG: 0.1 TABLET ORAL at 09:50

## 2023-09-12 RX ADMIN — CLONIDINE HYDROCHLORIDE 0.1 MG: 0.1 TABLET ORAL at 20:30

## 2023-09-12 RX ADMIN — Medication: at 13:15

## 2023-09-12 RX ADMIN — ACETAMINOPHEN 650 MG: 325 TABLET ORAL at 18:29

## 2023-09-12 ASSESSMENT — PAIN DESCRIPTION - DESCRIPTORS
DESCRIPTORS: SHARP
DESCRIPTORS: ACHING
DESCRIPTORS: ACHING

## 2023-09-12 ASSESSMENT — PAIN DESCRIPTION - FREQUENCY
FREQUENCY: INTERMITTENT
FREQUENCY: CONTINUOUS
FREQUENCY: INTERMITTENT

## 2023-09-12 ASSESSMENT — PAIN - FUNCTIONAL ASSESSMENT
PAIN_FUNCTIONAL_ASSESSMENT: ACTIVITIES ARE NOT PREVENTED

## 2023-09-12 ASSESSMENT — PAIN DESCRIPTION - PAIN TYPE
TYPE: ACUTE PAIN

## 2023-09-12 ASSESSMENT — PAIN SCALES - GENERAL
PAINLEVEL_OUTOF10: 0
PAINLEVEL_OUTOF10: 8
PAINLEVEL_OUTOF10: 0
PAINLEVEL_OUTOF10: 0
PAINLEVEL_OUTOF10: 6
PAINLEVEL_OUTOF10: 9

## 2023-09-12 ASSESSMENT — PAIN DESCRIPTION - ONSET
ONSET: GRADUAL
ONSET: ON-GOING
ONSET: GRADUAL

## 2023-09-12 ASSESSMENT — PAIN DESCRIPTION - LOCATION
LOCATION: HEAD
LOCATION: MOUTH
LOCATION: HEAD

## 2023-09-12 NOTE — PROGRESS NOTES
Pt resting in bed throughout afternoon. He has had no c/o nausea, numbness, or tingling, but did c/o pain on tip of tongue from biting it during previous seizure. Orajel ordered and administered with good results. No seizure activity noted. Will continue to monitor.

## 2023-09-12 NOTE — PROGRESS NOTES
Pt transferred to room 3103 on RA by stretcher. VSS. Report given to Shanel Ram RN, all questions answered.  All Belongings are with Pt

## 2023-09-12 NOTE — PROGRESS NOTES
Pt arrived to room 3103 from ICU, wife at bedside. Vital signs taken and were WNL. Assessment completed. Pt oriented to room, bed, phone, and call light. Seizure precautions in place. Call light, bedside table and phone within easy reach. Pt instructed to use call light to call for assistance.

## 2023-09-12 NOTE — PROGRESS NOTES
[facility]   Speech Therapy  Daily Dysphagia Treatment Note  DISCHARGE SUMMARY    Erwin Doss  AGE: 47 y.o. 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  male, with past medical history significant for hypertension presented hospital due to altered mental status. He was recently admitted to hospital on 2023 due to altered mental status concerning for seizure, syncope. He was also found to have acute kidney injury. He was evaluated by Neurology. MRI of the brain showed a normal FLAIR signal in the temporal region. He was discharged stable. He came back today due to altered mental status. Does not take his medications yet. No fever or chills. No nausea or vomiting. No cough, headache, lateralizing weakness or numbness. At the ED, initial blood pressure was 212/82. Work-up significant for bilirubin 1.5. Details unremarkable. MRI of the brain with enhancement within the area of the right cerebral hemisphere. He was given Ativan, Keppra, IV fluid and Geodon. Ten point ROS reviewed negative, unless as noted above     Neuro consult and notes reviewed     2023: transferred to ICU    IMAGING:  CXR: 2023  IMPRESSION:  No acute process. CT HEAD: 2023  IMPRESSION:  1. No acute intracranial abnormality. BRAIN MRI: 2023  IMPRESSION:  Enhancement within the area of right cerebral hemisphere signal abnormality.   Differential continues to favor herpes encephalitis and other forms penetration/aspiration. Recommend regular diet texture with thin liquids. Max potential for dysphagia appears achieved at this time; discontinue skilled ST    Recommended Diet and Intervention 9/12/2023:  Diet Solids Recommendation:  Regular texture diet  Liquid Consistency Recommendation: Thin liquids  Recommended form of Meds: Meds whole with water     Compensatory Swallowing Strategies:  Upright as possible with all PO intake , Remain upright 30-45 min     EDUCATION:   Provided education regarding role of SLP, results of assessment, recommendations and general speech pathology plan of care. [x] Pt verbalized understanding and agreement   [] Pt requires ongoing learning   [] No evidence of comprehension     Dysphagia Prognosis: [x] good []fair []poor []guarded  Barriers to progress:  medical comorbidities    Plan:     Continue Dysphagia Therapy: NO    Discharge Instructions:   No further follow-up appears indicated at this time. This note serves as a D/C Summary in the event that this patient is discharged prior to the next therapy session.     Coded treatment time: 0  Total treatment time: 20    Electronically signed by   AMY Alexander    Intern  on 9/12/2023 at 11:41 AM

## 2023-09-12 NOTE — PROGRESS NOTES
Interval History and plan:      Has excellent urine output  Creatinine continues to get better to 1.9 now peak of 3 this hospitalization potassium is stable at 3.9, CO2 24  Blood pressure overall is stable  Decrease the IV fluids to 100 mm/h                     Assessment :     Acute Kidney Injury  Likely due to acyclovir which has been is stopped at the time of consultation  Also blood pressure went down to as low as 94 systolic on 0/4/16906-  From 2012 on presentation  Creatinine 3 on 9/10/2023  Also variable blood pressure which could be contributory  Given that he has seizure checked for CPK- unlikely to be contributing to MARY GRACE  UA-trace blood otherwise bland  Renal ultrasound- pending    Hypertension   BP: (146-153)/(86-96)  Pulse:  [65-72]   BP goal inpatient [phone] systolic inpatient      Seizure  Altered mental status    [facility] would like to thank Eva Norwood MD   for opportunity to serve this patient      Please call with questions at-   24 Hrs Answering service [phone] or  7 am- 5 pm via Perfect serve or cell phone  Mariel Foreman MD     HPI :     Joao Carmona is a 47 y.o. male presented to   the hospital on 9/8/2023 with seizure and confusion. He is not known to have seizures CT scan of the brain was done which was negative. He had MRI done which showed  Within the area of the right cerebral hemisphere. He was started on antiseizure medication. Also infectious disease physician was consulted for possible herpetic  Encephalitis. He was treated with IV acyclovir. His mental status is getting better. In the meantime he is noted to have high creatinine because of which we are consulted.       PMH/PSH/SH/Family History:     Past Medical History:   Diagnosis Date    Hypertension           Medication:   Current Facility-Administered Medications: NIFEdipine (PROCARDIA XL) extended release tablet 90 mg, 90 mg, Oral, Daily  acetaminophen (TYLENOL) tablet 650 mg, 650 mg, Oral, Q4H PRN  0.9 % sodium chloride infusion, , IntraVENous, Continuous  labetalol (NORMODYNE;TRANDATE) injection 10 mg, 10 mg, IntraVENous, Q4H PRN  cloNIDine (CATAPRES) tablet 0.1 mg, 0.1 mg, Oral, BID  hydrALAZINE (APRESOLINE) injection 5 mg, 5 mg, IntraVENous, Q4H PRN  dexmedetomidine (PRECEDEX) 400 mcg in sodium chloride 0.9 % 100 mL infusion, 0.1-1.5 mcg/kg/hr (Order-Specific), IntraVENous, Continuous  potassium chloride (KLOR-CON M) extended release tablet 40 mEq, 40 mEq, Oral, PRN **OR** potassium bicarb-citric acid (EFFER-K) effervescent tablet 40 mEq, 40 mEq, Oral, PRN **OR** potassium chloride 10 mEq/100 mL IVPB (Peripheral Line), 10 mEq, IntraVENous, PRN  magnesium sulfate 1000 mg in dextrose 5% 100 mL IVPB, 1,000 mg, IntraVENous, PRN  sodium phosphate 12.87 mmol in sodium chloride 0.9 % 250 mL IVPB, 0.16 mmol/kg, IntraVENous, PRN **OR** sodium phosphate 25.74 mmol in sodium chloride 0.9 % 250 mL IVPB, 0.32 mmol/kg, IntraVENous, PRN  sodium chloride flush 0.9 % injection 5-40 mL, 5-40 mL, IntraVENous, 2 times per day  sodium chloride flush 0.9 % injection 5-40 mL, 5-40 mL, IntraVENous, PRN  0.9 % sodium chloride infusion, , IntraVENous, PRN  ondansetron (ZOFRAN-ODT) disintegrating tablet 4 mg, 4 mg, Oral, Q8H PRN **OR** ondansetron (ZOFRAN) injection 4 mg, 4 mg, IntraVENous, Q6H PRN  polyethylene glycol (GLYCOLAX) packet 17 g, 17 g, Oral, Daily PRN  [Held by provider] enoxaparin (LOVENOX) injection 40 mg, 40 mg, SubCUTAneous, QHS  rosuvastatin (CRESTOR) tablet 40 mg, 40 mg, Oral, Nightly  [Held by provider] aspirin chewable tablet 81 mg, 81 mg, Oral, Daily **OR** [Held by provider] aspirin suppository 300 mg, 300 mg, Rectal, Daily  LORazepam (ATIVAN) injection 2 mg, 2 mg, IntraVENous, Q6H PRN  levETIRAcetam (KEPPRA) 500 mg/100 mL IVPB, 500 mg, IntraVENous, BID  ziprasidone (GEODON) 10 mg in sterile water 0.5 mL injection, 10 mg, IntraMUSCular, Once  diphenhydrAMINE (BENADRYL) injection 25 mg, 25

## 2023-09-12 NOTE — PROGRESS NOTES
seconds   Peripheral Pulses: +2 palpable, equal bilaterally      24HR INTAKE/OUTPUT:    Intake/Output Summary (Last 24 hours) at 9/12/2023 0839  Last data filed at 9/12/2023 0745  Gross per 24 hour   Intake 3817.6 ml   Output 6900 ml   Net -3082.4 ml       I/O last 3 completed shifts: In: 5625.1 [P.O.:200;  I.V.:5066.9; IV Piggyback:358.2]  Out: 8100 [Urine:8100]  I/O this shift:  In: -   Out: 300 [Urine:300]      Meds:    NIFEdipine  90 mg Oral Daily    cloNIDine  0.1 mg Oral BID    sodium chloride flush  5-40 mL IntraVENous 2 times per day    [Held by provider] enoxaparin  40 mg SubCUTAneous QHS    rosuvastatin  40 mg Oral Nightly    [Held by provider] aspirin  81 mg Oral Daily    Or    [Held by provider] aspirin  300 mg Rectal Daily    levETIRAcetam  500 mg IntraVENous BID    ziprasidone (GEODON) 10 mg in sterile water 0.5 mL injection  10 mg IntraMUSCular Once    diphenhydrAMINE  25 mg IntraVENous Once    sodium chloride flush  5-40 mL IntraVENous 2 times per day    thiamine  100 mg IntraVENous Daily    LORazepam  2 mg IntraMUSCular Once     Infusions:    sodium chloride 150 mL/hr at 09/12/23 0640    dexmedetomidine Stopped (09/10/23 0648)    sodium chloride      sodium chloride       PRN Meds: acetaminophen, labetalol, hydrALAZINE, potassium chloride **OR** potassium alternative oral replacement **OR** potassium chloride, magnesium sulfate, sodium phosphate 12.87 mmol in sodium chloride 0.9 % 250 mL IVPB **OR** sodium phosphate 25.74 mmol in sodium chloride 0.9 % 250 mL IVPB, sodium chloride flush, sodium chloride, ondansetron **OR** ondansetron, polyethylene glycol, LORazepam, OLANZapine, sodium chloride flush, sodium chloride, LORazepam    Labs/imaging:  CBC:   Recent Labs     09/10/23  0508 09/11/23  0500 09/12/23  0405   WBC 7.6 7.1 5.6   HGB 14.3 13.6 15.9    237 267       BMP:    Recent Labs     09/10/23  1729 09/11/23  0500 09/12/23  0405    144 142   K 3.8 3.9 3.9   * 114* 108   CO2

## 2023-09-12 NOTE — PLAN OF CARE
Problem: Discharge Planning  Goal: Discharge to home or other facility with appropriate resources  Flowsheets  Taken 9/12/2023 1215 by Jeffery Turcios RN  Discharge to home or other facility with appropriate resources:   Identify barriers to discharge with patient and caregiver   Identify discharge learning needs (meds, wound care, etc)  Taken 9/12/2023 0745 by Juan Corona RN  Discharge to home or other facility with appropriate resources: Identify barriers to discharge with patient and caregiver     Problem: Pain  Goal: Verbalizes/displays adequate comfort level or baseline comfort level  Flowsheets  Taken 9/12/2023 1215 by Jeffery Turcios RN  Verbalizes/displays adequate comfort level or baseline comfort level:   Encourage patient to monitor pain and request assistance   Administer analgesics based on type and severity of pain and evaluate response   Consider cultural and social influences on pain and pain management   Assess pain using appropriate pain scale   Implement non-pharmacological measures as appropriate and evaluate response   Notify Licensed Independent Practitioner if interventions unsuccessful or patient reports new pain  Taken 9/12/2023 0745 by Juan Corona RN  Verbalizes/displays adequate comfort level or baseline comfort level: Encourage patient to monitor pain and request assistance     Problem: Safety - Adult  Goal: Free from fall injury  Flowsheets (Taken 9/12/2023 1215)  Free From Fall Injury: Instruct family/caregiver on patient safety     Problem: Skin/Tissue Integrity  Goal: Absence of new skin breakdown  Description: 1. Monitor for areas of redness and/or skin breakdown  2. Assess vascular access sites hourly  3. Every 4-6 hours minimum:  Change oxygen saturation probe site  4. Every 4-6 hours:  If on nasal continuous positive airway pressure, respiratory therapy assess nares and determine need for appliance change or resting period.   Note: No areas of skin breakdown

## 2023-09-12 NOTE — PROGRESS NOTES
[facility] LETY Scanner Results  Pt was scanned according to 's recommendations. Transfer to new unit      Site Reading Redness noted? Y/N   Right heel 0.5 N   Left heel  0.2 N   Sacrum 0.3 N       [x]  LETY Delta score < 0.6 indicates normal, healthy tissue at this time. Continue to assess daily and implement routine pressure injury prevention measures using the Kendell Order Set. Scan weekly on Wednesday. [] LETY Delta score > or = to 0.6 indicates at risk tissue. Implement target prevention interventions below and scan daily. [] Kendell orders reviewed and updated if indicated  [] Educated patient/family on importance of offloading/repositioning  [] Patient agreeable to plan for pressure offloading/repositioning    Other None needed      [] Nutrition consult made  [x] Nutrition consult not indicated at this time     [] Sacral foam border in place  [] Sacral foam border not in place, barrier cream applied    [] Low air loss mattress (or Isoflex blue/orange mattress) ordered/placed   [] For heels, apply liquid skin barrier twice daily and ensure offloading with pillows or boots    Weekly Wednesday scan    Maintain Routine Pressure Ulcer Prevention Strategies;  Enter Kendell orders as appropriate\"    Continue previous interventions and monitor skin (heels & sacrum) frequently    Continue previous interventions and monitor skin (heels and sacrum) frequently

## 2023-09-12 NOTE — PROGRESS NOTES
Infectious Diseases Inpatient Progress Note    CHIEF COMPLAINT:     Seizures  MARY GRACE  Suspected  Encephalitis  Agitation   HTN      HISTORY OF PRESENT ILLNESS:  47 y.o. man with HTN was admitted to [address] [place] [address] on 8/5 with seizure episode and had tongue bite and Lactic acid at 13 with change in mentation and was found down and brought in to hospital no warning signs no fevers and no head injury and recovered quickly as in patient and per d/c summary was back to base line mentation at d.c. He was in mild MARY GRACE with creat at 1.4, UDS + for cannabis . MRI with contrast onIMPRESSION:  No enhancement associated with the right posterior hemisphere white matter lesion. Differential includes progressive multifocal leukoencephalopathy and  low-grade brain neoplasm.: was reported and he was seen by Neurology with a plan for follow up as out patient. He now presented to ED here with another episode of seizure activity and per ED notes this was lasted for 1 minute and now he is more agitated and speech is slurred and able to follow simple commands and had some bleeding from the mouth from prior tongue bite. He has no fevers no chills no rash no exposures no sick contacts no travel and other wise History is all negative. He had repeat MRI of the brain on 9/8 with enhancement of  RT cerebral hemisphere signal abnormality noted and concern for HSV encephalitis was raised and we are now consulted to provide recommendations.  Per his fiance at bed side he was other wise normal before the seizure activity no HSV lesions no tick bites no recent vaccines.    :    Interval History : NOW tx to medical floor and no seizures no head aches but having some tingling and pain on the left side he was diagnosed with pinch nerve on the Left side in the past per pt but he has more symptoms on the Left leg as well     No fevers and CSF clear and HSV PCR pending   Past Medical History:    Past Medical History:   Diagnosis Date    Hypertension        Past fluoroscopic-guided lumbar puncture. US RENAL COMPLETE   Final Result   Unremarkable ultrasound of the kidneys and urinary bladder. MRI BRAIN W WO CONTRAST   Final Result   Enhancement within the area of right cerebral hemisphere signal abnormality. Differential continues to favor herpes encephalitis and other forms of acute   encephalitis. CT HEAD WO CONTRAST   Final Result   1. No acute intracranial abnormality. Narrative & Impression  EXAMINATION:  MRI OF THE BRAIN WITH CONTRAST  9/6/2023 3:32 pm     TECHNIQUE:  Multiplanar multisequence MRI of the head/brain was performed with the  administration of intravenous contrast.     COMPARISON:  Imaging from earlier the same day. HISTORY:  ORDERING SYSTEM PROVIDED HISTORY: seizure; prior MRI did not have contrast  TECHNOLOGIST PROVIDED HISTORY:  Reason for exam:->seizure; prior MRI did not have contrast  Reason for Exam: Wanted MRi with contraast. WO contrast MRi done this morning     FINDINGS:  Only pre and postcontrast T1 weighted sequences were performed on the current  exam.  These results should be interpreted in conjunction with the recent  noncontrast MRI. The area of T2 hyperintensity in the posterior white matter of the right  cerebral hemisphere predominantly in the parietal, temporal, occipital and  insular regions does not demonstrate enhancement. On the previous exam,  there was mild diffusion restriction associated with the lesion, particularly  in the parietal region. T1 signal in this region is normal.     IMPRESSION:  No enhancement associated with the right posterior hemisphere white matter  lesion. Differential includes progressive multifocal leukoencephalopathy and  low-grade brain neoplasm. RECOMMENDATIONS:  Consider follow-up MRI to evaluate for evolution of the lesion. All pertinent images and reports for the current Hospitalization were reviewed by me.     IMPRESSION:    Patient Active

## 2023-09-12 NOTE — PROGRESS NOTES
4 Eyes Admission Assessment     I agree as the admission nurse that 2 RN's have performed a thorough Head to Toe Skin Assessment on the patient. ALL assessment sites listed below have been assessed on admission. Areas assessed by both nurses:   [x]   Head, Face, and Ears   [x]   Shoulders, Back, and Chest  [x]   Arms, Elbows, and Hands   [x]   Coccyx, Sacrum, and Ischum  [x]   Legs, Feet, and Heels        Does the Patient have Skin Breakdown?   No         Kendell Prevention initiated:  No   Wound Care Orders initiated:  No      C nurse consulted for Pressure Injury (Stage 3,4, Unstageable, DTI, NWPT, and Complex wounds):  No      Nurse 1 eSignature: Electronically signed by Liang Leon RN on 9/12/23 at 12:13 PM EDT    **SHARE this note so that the co-signing nurse is able to place an eSignature**    Nurse 2 eSignature: Electronically signed by Bear Martin RN on 9/12/23 at 12:17 PM EDT

## 2023-09-13 LAB
ALBUMIN SERPL-MCNC: 3.8 G/DL (ref 3.4–5)
ALBUMIN/GLOB SERPL: 1.4 {RATIO} (ref 1.1–2.2)
ALP SERPL-CCNC: 96 U/L (ref 40–129)
ALT SERPL-CCNC: 15 U/L (ref 10–40)
ANION GAP SERPL CALCULATED.3IONS-SCNC: 10 MMOL/L (ref 3–16)
AST SERPL-CCNC: 12 U/L (ref 15–37)
BASOPHILS # BLD: 0.1 K/UL (ref 0–0.2)
BASOPHILS NFR BLD: 1.1 %
BILIRUB SERPL-MCNC: 0.9 MG/DL (ref 0–1)
BUN SERPL-MCNC: 10 MG/DL (ref 7–20)
CALCIUM SERPL-MCNC: 9.4 MG/DL (ref 8.3–10.6)
CHLORIDE SERPL-SCNC: 108 MMOL/L (ref 99–110)
CO2 SERPL-SCNC: 24 MMOL/L (ref 21–32)
CREAT SERPL-MCNC: 1.8 MG/DL (ref 0.9–1.3)
DEPRECATED RDW RBC AUTO: 12.3 % (ref 12.4–15.4)
EOSINOPHIL # BLD: 0.2 K/UL (ref 0–0.6)
EOSINOPHIL NFR BLD: 4.4 %
GFR SERPLBLD CREATININE-BSD FMLA CKD-EPI: 45 ML/MIN/{1.73_M2}
GLUCOSE SERPL-MCNC: 103 MG/DL (ref 70–99)
HCT VFR BLD AUTO: 41.7 % (ref 40.5–52.5)
HGB BLD-MCNC: 15 G/DL (ref 13.5–17.5)
LYMPHOCYTES # BLD: 0.9 K/UL (ref 1–5.1)
LYMPHOCYTES NFR BLD: 17.1 %
MAGNESIUM SERPL-MCNC: 1.9 MG/DL (ref 1.8–2.4)
MCH RBC QN AUTO: 31.9 PG (ref 26–34)
MCHC RBC AUTO-ENTMCNC: 35.9 G/DL (ref 31–36)
MCV RBC AUTO: 89 FL (ref 80–100)
MONOCYTES # BLD: 0.6 K/UL (ref 0–1.3)
MONOCYTES NFR BLD: 11.5 %
NEUTROPHILS # BLD: 3.5 K/UL (ref 1.7–7.7)
NEUTROPHILS NFR BLD: 65.9 %
PHOSPHATE SERPL-MCNC: 3.2 MG/DL (ref 2.5–4.9)
PLATELET # BLD AUTO: 290 K/UL (ref 135–450)
PMV BLD AUTO: 7.2 FL (ref 5–10.5)
POTASSIUM SERPL-SCNC: 4 MMOL/L (ref 3.5–5.1)
PROT SERPL-MCNC: 6.6 G/DL (ref 6.4–8.2)
RBC # BLD AUTO: 4.68 M/UL (ref 4.2–5.9)
SODIUM SERPL-SCNC: 142 MMOL/L (ref 136–145)
VDRL CSF QL: NON REACTIVE
WBC # BLD AUTO: 5.3 K/UL (ref 4–11)

## 2023-09-13 PROCEDURE — 6360000002 HC RX W HCPCS: Performed by: INTERNAL MEDICINE

## 2023-09-13 PROCEDURE — 2580000003 HC RX 258: Performed by: INTERNAL MEDICINE

## 2023-09-13 PROCEDURE — 6370000000 HC RX 637 (ALT 250 FOR IP): Performed by: NURSE PRACTITIONER

## 2023-09-13 PROCEDURE — 6370000000 HC RX 637 (ALT 250 FOR IP): Performed by: INTERNAL MEDICINE

## 2023-09-13 PROCEDURE — 2580000003 HC RX 258: Performed by: REGISTERED NURSE

## 2023-09-13 PROCEDURE — 36415 COLL VENOUS BLD VENIPUNCTURE: CPT

## 2023-09-13 PROCEDURE — 2060000000 HC ICU INTERMEDIATE R&B

## 2023-09-13 PROCEDURE — 6360000002 HC RX W HCPCS: Performed by: REGISTERED NURSE

## 2023-09-13 PROCEDURE — 99232 SBSQ HOSP IP/OBS MODERATE 35: CPT | Performed by: INTERNAL MEDICINE

## 2023-09-13 PROCEDURE — 84100 ASSAY OF PHOSPHORUS: CPT

## 2023-09-13 PROCEDURE — 85025 COMPLETE CBC W/AUTO DIFF WBC: CPT

## 2023-09-13 PROCEDURE — 80053 COMPREHEN METABOLIC PANEL: CPT

## 2023-09-13 PROCEDURE — 83735 ASSAY OF MAGNESIUM: CPT

## 2023-09-13 RX ORDER — HYDRALAZINE HYDROCHLORIDE 50 MG/1
50 TABLET, FILM COATED ORAL ONCE
Status: COMPLETED | OUTPATIENT
Start: 2023-09-13 | End: 2023-09-13

## 2023-09-13 RX ORDER — HYDRALAZINE HYDROCHLORIDE 50 MG/1
50 TABLET, FILM COATED ORAL EVERY 8 HOURS SCHEDULED
Status: DISCONTINUED | OUTPATIENT
Start: 2023-09-13 | End: 2023-09-14 | Stop reason: HOSPADM

## 2023-09-13 RX ORDER — LEVETIRACETAM 500 MG/1
500 TABLET ORAL 2 TIMES DAILY
Status: DISCONTINUED | OUTPATIENT
Start: 2023-09-13 | End: 2023-09-14 | Stop reason: HOSPADM

## 2023-09-13 RX ORDER — SODIUM CHLORIDE, SODIUM LACTATE, POTASSIUM CHLORIDE, CALCIUM CHLORIDE 600; 310; 30; 20 MG/100ML; MG/100ML; MG/100ML; MG/100ML
INJECTION, SOLUTION INTRAVENOUS CONTINUOUS
Status: DISCONTINUED | OUTPATIENT
Start: 2023-09-13 | End: 2023-09-14

## 2023-09-13 RX ADMIN — SODIUM CHLORIDE, PRESERVATIVE FREE 10 ML: 5 INJECTION INTRAVENOUS at 08:25

## 2023-09-13 RX ADMIN — ACETAMINOPHEN 650 MG: 325 TABLET ORAL at 16:50

## 2023-09-13 RX ADMIN — THIAMINE HYDROCHLORIDE 100 MG: 100 INJECTION, SOLUTION INTRAMUSCULAR; INTRAVENOUS at 08:17

## 2023-09-13 RX ADMIN — HYDRALAZINE HYDROCHLORIDE 50 MG: 50 TABLET, FILM COATED ORAL at 09:13

## 2023-09-13 RX ADMIN — SODIUM CHLORIDE, POTASSIUM CHLORIDE, SODIUM LACTATE AND CALCIUM CHLORIDE: 600; 310; 30; 20 INJECTION, SOLUTION INTRAVENOUS at 16:55

## 2023-09-13 RX ADMIN — NIFEDIPINE 90 MG: 30 TABLET, FILM COATED, EXTENDED RELEASE ORAL at 08:15

## 2023-09-13 RX ADMIN — ENOXAPARIN SODIUM 40 MG: 100 INJECTION SUBCUTANEOUS at 21:24

## 2023-09-13 RX ADMIN — CLONIDINE HYDROCHLORIDE 0.1 MG: 0.1 TABLET ORAL at 08:16

## 2023-09-13 RX ADMIN — LEVETIRACETAM 500 MG: 5 INJECTION, SOLUTION INTRAVENOUS at 08:24

## 2023-09-13 RX ADMIN — LEVETIRACETAM 500 MG: 500 TABLET, FILM COATED ORAL at 21:24

## 2023-09-13 RX ADMIN — SODIUM CHLORIDE: 9 INJECTION, SOLUTION INTRAVENOUS at 08:08

## 2023-09-13 RX ADMIN — HYDRALAZINE HYDROCHLORIDE 50 MG: 50 TABLET, FILM COATED ORAL at 16:55

## 2023-09-13 RX ADMIN — HYDRALAZINE HYDROCHLORIDE 50 MG: 50 TABLET, FILM COATED ORAL at 21:24

## 2023-09-13 ASSESSMENT — PAIN DESCRIPTION - DESCRIPTORS: DESCRIPTORS: ACHING

## 2023-09-13 ASSESSMENT — PAIN DESCRIPTION - ORIENTATION: ORIENTATION: LEFT;RIGHT;ANTERIOR

## 2023-09-13 ASSESSMENT — PAIN - FUNCTIONAL ASSESSMENT: PAIN_FUNCTIONAL_ASSESSMENT: ACTIVITIES ARE NOT PREVENTED

## 2023-09-13 ASSESSMENT — PAIN DESCRIPTION - LOCATION: LOCATION: HEAD

## 2023-09-13 ASSESSMENT — PAIN SCALES - GENERAL: PAINLEVEL_OUTOF10: 0

## 2023-09-13 NOTE — PROGRESS NOTES
Component Value Date/Time    CKTOTAL 378 09/10/2023 10:27 AM     CRP: No results found for: \"CRP\"  Hepatic Function Panel:   Lab Results   Component Value Date/Time    ALKPHOS 96 09/13/2023 04:11 AM    ALT 15 09/13/2023 04:11 AM    AST 12 09/13/2023 04:11 AM    PROT 6.6 09/13/2023 04:11 AM    BILITOT 0.9 09/13/2023 04:11 AM    BILIDIR <0.2 02/10/2020 12:38 PM    IBILI see below 02/10/2020 12:38 PM    LABALBU 3.8 09/13/2023 04:11 AM     UA:  Lab Results   Component Value Date/Time    COLORU Yellow 09/10/2023 12:54 PM    CLARITYU Clear 09/10/2023 12:54 PM    GLUCOSEU Negative 09/10/2023 12:54 PM    BILIRUBINUR Negative 09/10/2023 12:54 PM    KETUA Negative 09/10/2023 12:54 PM    SPECGRAV 1.006 09/10/2023 12:54 PM    BLOODU TRACE 09/10/2023 12:54 PM    PHUR 5.5 09/10/2023 12:54 PM    PROTEINU Negative 09/10/2023 12:54 PM    UROBILINOGEN 0.2 09/10/2023 12:54 PM    NITRU Negative 09/10/2023 12:54 PM    LEUKOCYTESUR Negative 09/10/2023 12:54 PM    URINETYPE Other 09/10/2023 12:54 PM      Urine Microscopic:   Lab Results   Component Value Date/Time    BACTERIA None Seen 09/10/2023 12:54 PM    HYALCAST 1 09/10/2023 12:54 PM    WBCUA 1 09/10/2023 12:54 PM    RBCUA 1 09/10/2023 12:54 PM    EPIU 0 09/10/2023 12:54 PM     Urine Reflex to Culture:   Lab Results   Component Value Date/Time    URRFLXCULT Not Indicated 09/05/2023 03:47 AM       CK   178     9/5/23 : Lactic acid  13       Procedure Date  Date: 09/06/2023 Start: 09:48 AM     Study Location: [address] [place] [address] - Echo Lab  Technical Quality: Adequate visualization     Indications:Syncope. Height: 70 inches Weight: 178 pounds BSA: 1.99 m2 BMI: 25.54 kg/m2     BP: 190/86 mmHg      Conclusions      Summary   Normal left ventricle size, wall thickness, and systolic function with an   estimated ejection fraction of 55-60%. No regional wall motion abnormalities   are seen.       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 Seizure ([address])  R56.9          New onset of Seizures  Had loss of consciousness on 1st episode when admitted to [facility]  Tongue bite++  Change in mentation  Mentation improved when d/c from [facility]  Now admitted with in 24-48 hrs seizure episode again  No fevers  No SKIN lesions  MRI brain now reported to be abnormal with Rt cerebral hemisphere signal changes with some concern for HSV encephalitis but CSF WBC normal and Meningitis panel -ve  WBC normal   Lactic acidosis from seizure resolved    Mentation now some improvement and speech still slurred and some confusion and restlessness during exam on 9/8/23 and now mentation is better following commands        He is not immune suppressed no other exposures and history is largely negative he does drink every day per HPI and recent alcohol check negative may increase the risk for DTS and seizures as well      MARY GRACE on IV Acyclovir was stopped on 9/10 and now improving no fevers and Mentation back to normal no agitation    S/p  LP and  CSF  is clean and Meningitis panel -ve CSF WBC 1, hsv pcr PENDING     No fever and he has some on going Left side tingling Upper extremity and Lower extremity  -  but no weakness    D/w Neurology as no fevers no head aches and CSF clean infectious process less likely and may need repeat MRI of the brain         Labs, Microbiology, Radiology and pertinent results from current hospitalization and care every where were reviewed by me as a part of the consultation.     PLAN :  Off  IV Acyclovir  due to MARY GRACE  and CSF clean    Watch creat now trend down   Seen by Nephrology    PROCAL  -ve   HIV -ve  , Hepatitis screen  -ve  LP   completed on 9/11   CSF for Cell counts normal , Gluc 72 , Protein 24 , CSF cx, HSV pcr

## 2023-09-13 NOTE — PROGRESS NOTES
Client is A&Ox4. Client reported a headache 8/10. This student nurse reported headache to his RN who administered prn Tylenol 650mg. Client stated he is ok does not need anything he is expecting his wife later this evening. Call light within reach.  Student Nurse Godwin Puente

## 2023-09-13 NOTE — PLAN OF CARE
Problem: Discharge Planning  Goal: Discharge to home or other facility with appropriate resources  9/12/2023 2253 by Lamont Brown RN  Outcome: Progressing  9/12/2023 1215 by Abdoulaye France RN  Flowsheets  Taken 9/12/2023 1215 by Abdoulaye France RN  Discharge to home or other facility with appropriate resources:   Identify barriers to discharge with patient and caregiver   Identify discharge learning needs (meds, wound care, etc)  Taken 9/12/2023 0745 by Audrey Rose RN  Discharge to home or other facility with appropriate resources: Identify barriers to discharge with patient and caregiver     Problem: Pain  Goal: Verbalizes/displays adequate comfort level or baseline comfort level  9/12/2023 2253 by Lamont Brown RN  Outcome: Progressing  9/12/2023 1215 by Abdoulaye France RN  Flowsheets  Taken 9/12/2023 1215 by Abdoulaye France RN  Verbalizes/displays adequate comfort level or baseline comfort level:   Encourage patient to monitor pain and request assistance   Administer analgesics based on type and severity of pain and evaluate response   Consider cultural and social influences on pain and pain management   Assess pain using appropriate pain scale   Implement non-pharmacological measures as appropriate and evaluate response   Notify Licensed Independent Practitioner if interventions unsuccessful or patient reports new pain  Taken 9/12/2023 0745 by Audrey Rose RN  Verbalizes/displays adequate comfort level or baseline comfort level: Encourage patient to monitor pain and request assistance     Problem: Safety - Adult  Goal: Free from fall injury  9/12/2023 2253 by Lamont Brown RN  Outcome: Progressing  9/12/2023 1215 by Abdoulaye France RN  Flowsheets (Taken 9/12/2023 1215)  Free From Fall Injury: Instruct family/caregiver on patient safety     Problem: Skin/Tissue Integrity  Goal: Absence of new skin breakdown  Description: 1. Monitor for areas of redness and/or skin breakdown  2. Assess vascular access sites hourly  3. Every 4-6 hours minimum:  Change oxygen saturation probe site  4. Every 4-6 hours:  If on nasal continuous positive airway pressure, respiratory therapy assess nares and determine need for appliance change or resting period. 9/12/2023 2253 by Keiry Tidwell RN  Outcome: Progressing  9/12/2023 1215 by Marques Pisano RN  Note: No areas of skin breakdown noted. Will monitor for changes. Problem: Neurosensory - Adult  Goal: Absence of seizures  9/12/2023 2253 by Keiry Tidwell RN  Outcome: Progressing  9/12/2023 1215 by Marques Pisano RN  Flowsheets (Taken 9/12/2023 1215)  Absence of seizures:   Monitor for seizure activity.   If seizure occurs, document type and location of movements and any associated apnea   Administer anticonvulsants as ordered

## 2023-09-13 NOTE — PROGRESS NOTES
Pt BP this morning was elevated- 176/98. Hydralazine was administered. Pt BP at 1125 was 102/67. Plan of care ongoing.  Electronically signed by Martinez Lawrence RN on 9/13/2023 at 11:36 AM

## 2023-09-13 NOTE — PROGRESS NOTES
Hospital Medicine Progress Note     Date:  9/13/2023    PCP: Abimael Johnson (Tel: [phone])    Date of Admission: 9/8/2023    Chief complaint:   Chief Complaint   Patient presents with    Seizures     Pt d/c'd from [facility] yesterday after a two night visit for convulsions, pt denies seizure HX, denies any ETOH/benzo W/D or HX use. Pt's SO stated he had an episode en route, upon getting pt back into the ER, this RN witnessed another episode of approx. 1 minute. Head convulsions, eye deviate to the right side, speech becomes slurred, mouth droops to the left, drooling present and head shakes towards the left. Brief admission history: 50-year-old male with history of essential hypertension, marijuana use, tobacco use disorder, who presented to the emergency room for evaluation of confusion. He was recently admitted on 9/5/2023 following presentation with syncopal episode, at the time, there was suspicion for possible seizure. MRI-brain without contrast was concerning for T2 flair hyperintensity with cortical thickening involving the right temporal lobe extending to involve the right parietal occipital lobes. 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 He came back to the emergency room on 9/8/2023 with convulsions, with 1 episode reported by significant other, and another witnessed episode in the emergency room, after which he became confused. A repeat MRI-brain with and without contrast obtained on 9/8/2023 demonstrates enhancement within the area of right cerebral hemisphere signal abnormality, concerning for herpes encephalitis and other forms of acute encephalitis. Assessment/plan:  Generalized tonic-clonic seizure. Unclear etiology.   There is concern for possible encephalitis; however, this has been ruled Hepatic:   Recent Labs     09/11/23  0500 09/12/23  0405 09/13/23  0411   AST 15 16 12*   ALT 13 16 15   BILITOT 1.0 1.1* 0.9   ALKPHOS 83 105 96         Talia Menjivar MD  -------------------------------  [facility]

## 2023-09-13 NOTE — PLAN OF CARE
Problem: Discharge Planning  Goal: Discharge to home or other facility with appropriate resources  Outcome: Progressing  Flowsheets (Taken 9/13/2023 1100)  Discharge to home or other facility with appropriate resources: Identify barriers to discharge with patient and caregiver     Problem: Pain  Goal: Verbalizes/displays adequate comfort level or baseline comfort level  Outcome: Progressing     Problem: Safety - Adult  Goal: Free from fall injury  Outcome: Progressing  Flowsheets (Taken 9/13/2023 1745)  Free From Fall Injury: Instruct family/caregiver on patient safety     Problem: Skin/Tissue Integrity  Goal: Absence of new skin breakdown  Description: 1. Monitor for areas of redness and/or skin breakdown  2. Assess vascular access sites hourly  3. Every 4-6 hours minimum:  Change oxygen saturation probe site  4. Every 4-6 hours:  If on nasal continuous positive airway pressure, respiratory therapy assess nares and determine need for appliance change or resting period. Outcome: Progressing     Problem: Neurosensory - Adult  Goal: Absence of seizures  Outcome: Progressing  Flowsheets (Taken 9/13/2023 1100)  Absence of seizures: Monitor for seizure activity.   If seizure occurs, document type and location of movements and any associated apnea

## 2023-09-14 VITALS
SYSTOLIC BLOOD PRESSURE: 138 MMHG | DIASTOLIC BLOOD PRESSURE: 64 MMHG | RESPIRATION RATE: 17 BRPM | HEIGHT: 70 IN | BODY MASS INDEX: 24.81 KG/M2 | TEMPERATURE: 98.3 F | OXYGEN SATURATION: 93 % | HEART RATE: 93 BPM | WEIGHT: 173.28 LBS

## 2023-09-14 LAB
ALBUMIN CSF-MCNC: 17.5 MG/DL (ref 10–30)
ALBUMIN SERPL-MCNC: 4 G/DL (ref 3.4–5)
ALBUMIN SERPL-MCNC: 4140 MG/DL (ref 3400–5000)
ALBUMIN/GLOB SERPL: 1.4 {RATIO} (ref 1.1–2.2)
ALP SERPL-CCNC: 101 U/L (ref 40–129)
ALT SERPL-CCNC: 17 U/L (ref 10–40)
ANION GAP SERPL CALCULATED.3IONS-SCNC: 12 MMOL/L (ref 3–16)
AST SERPL-CCNC: 12 U/L (ref 15–37)
BASOPHILS # BLD: 0.1 K/UL (ref 0–0.2)
BASOPHILS NFR BLD: 1.5 %
BILIRUB SERPL-MCNC: 0.8 MG/DL (ref 0–1)
BUN SERPL-MCNC: 12 MG/DL (ref 7–20)
CALCIUM SERPL-MCNC: 9.6 MG/DL (ref 8.3–10.6)
CHLORIDE SERPL-SCNC: 107 MMOL/L (ref 99–110)
CO2 SERPL-SCNC: 24 MMOL/L (ref 21–32)
CREAT SERPL-MCNC: 1.8 MG/DL (ref 0.9–1.3)
DEPRECATED RDW RBC AUTO: 12.7 % (ref 12.4–15.4)
EOSINOPHIL # BLD: 0.2 K/UL (ref 0–0.6)
EOSINOPHIL NFR BLD: 4.1 %
GFR SERPLBLD CREATININE-BSD FMLA CKD-EPI: 45 ML/MIN/{1.73_M2}
GLUCOSE SERPL-MCNC: 106 MG/DL (ref 70–99)
HCT VFR BLD AUTO: 43.5 % (ref 40.5–52.5)
HGB BLD-MCNC: 15.6 G/DL (ref 13.5–17.5)
HSV1 DNA CSF QL NAA+PROBE: NOT DETECTED
HSV1 GG IGG SER-ACNC: 0.03 IV
HSV1+2 IGG SER IA-ACNC: >22.4 IV
HSV1+2 IGM SER IA-ACNC: 0.56 IV
HSV2 DNA CSF QL NAA+PROBE: NOT DETECTED
HSV2 GG IGG SER-ACNC: 9.14 IV
IGG CSF-MCNC: 2.1 MG/DL (ref 0–6)
IGG CSF/SERPL: 0.5 {RATIO} (ref 0.2–0.7)
IGG SERPL-MCNC: 999 MG/DL (ref 700–1600)
IGG SYNTH RATE SER+CSF CALC-MRATE: -2.8 MG/DAY (ref -9.9–3.3)
LYMPHOCYTES # BLD: 1.2 K/UL (ref 1–5.1)
LYMPHOCYTES NFR BLD: 20.6 %
MAGNESIUM SERPL-MCNC: 1.6 MG/DL (ref 1.8–2.4)
MCH RBC QN AUTO: 31.6 PG (ref 26–34)
MCHC RBC AUTO-ENTMCNC: 35.9 G/DL (ref 31–36)
MCV RBC AUTO: 87.9 FL (ref 80–100)
MONOCYTES # BLD: 0.6 K/UL (ref 0–1.3)
MONOCYTES NFR BLD: 11.2 %
NEUTROPHILS # BLD: 3.5 K/UL (ref 1.7–7.7)
NEUTROPHILS NFR BLD: 62.6 %
PHOSPHATE SERPL-MCNC: 3.9 MG/DL (ref 2.5–4.9)
PLATELET # BLD AUTO: 306 K/UL (ref 135–450)
PMV BLD AUTO: 6.8 FL (ref 5–10.5)
POTASSIUM SERPL-SCNC: 3.6 MMOL/L (ref 3.5–5.1)
PROT CSF-MCNC: 24 MG/DL (ref 15–45)
PROT PATTERN CSF ELPH-IMP: NORMAL
PROT SERPL-MCNC: 6.9 G/DL (ref 6.4–8.2)
RBC # BLD AUTO: 4.95 M/UL (ref 4.2–5.9)
SODIUM SERPL-SCNC: 143 MMOL/L (ref 136–145)
SPECIMEN SOURCE: NORMAL
WBC # BLD AUTO: 5.6 K/UL (ref 4–11)

## 2023-09-14 PROCEDURE — 80053 COMPREHEN METABOLIC PANEL: CPT

## 2023-09-14 PROCEDURE — 36415 COLL VENOUS BLD VENIPUNCTURE: CPT

## 2023-09-14 PROCEDURE — 6360000002 HC RX W HCPCS: Performed by: INTERNAL MEDICINE

## 2023-09-14 PROCEDURE — 6370000000 HC RX 637 (ALT 250 FOR IP): Performed by: INTERNAL MEDICINE

## 2023-09-14 PROCEDURE — 6360000002 HC RX W HCPCS: Performed by: REGISTERED NURSE

## 2023-09-14 PROCEDURE — 83735 ASSAY OF MAGNESIUM: CPT

## 2023-09-14 PROCEDURE — 2580000003 HC RX 258: Performed by: REGISTERED NURSE

## 2023-09-14 PROCEDURE — 6370000000 HC RX 637 (ALT 250 FOR IP): Performed by: NURSE PRACTITIONER

## 2023-09-14 PROCEDURE — 94760 N-INVAS EAR/PLS OXIMETRY 1: CPT

## 2023-09-14 PROCEDURE — 84100 ASSAY OF PHOSPHORUS: CPT

## 2023-09-14 PROCEDURE — 85025 COMPLETE CBC W/AUTO DIFF WBC: CPT

## 2023-09-14 PROCEDURE — 2580000003 HC RX 258: Performed by: INTERNAL MEDICINE

## 2023-09-14 RX ORDER — NIFEDIPINE 30 MG/1
90 TABLET, EXTENDED RELEASE ORAL DAILY
Qty: 90 TABLET | Refills: 3 | Status: SHIPPED | OUTPATIENT
Start: 2023-09-14

## 2023-09-14 RX ORDER — LEVETIRACETAM 500 MG/1
500 TABLET ORAL 2 TIMES DAILY
Qty: 60 TABLET | Refills: 3 | Status: SHIPPED | OUTPATIENT
Start: 2023-09-14

## 2023-09-14 RX ORDER — NIFEDIPINE 30 MG/1
90 TABLET, EXTENDED RELEASE ORAL DAILY
Qty: 30 TABLET | Refills: 3 | Status: SHIPPED | OUTPATIENT
Start: 2023-09-14 | End: 2023-09-14 | Stop reason: SDUPTHER

## 2023-09-14 RX ORDER — HYDRALAZINE HYDROCHLORIDE 50 MG/1
75 TABLET, FILM COATED ORAL EVERY 8 HOURS SCHEDULED
Qty: 135 TABLET | Refills: 3 | Status: SHIPPED | OUTPATIENT
Start: 2023-09-14

## 2023-09-14 RX ORDER — THIAMINE MONONITRATE (VIT B1) 100 MG
100 TABLET ORAL DAILY
Qty: 30 TABLET | Refills: 3 | Status: SHIPPED | OUTPATIENT
Start: 2023-09-14

## 2023-09-14 RX ORDER — LANOLIN ALCOHOL/MO/W.PET/CERES
400 CREAM (GRAM) TOPICAL DAILY
Qty: 30 TABLET | Refills: 3 | Status: SHIPPED | OUTPATIENT
Start: 2023-09-14

## 2023-09-14 RX ORDER — HYDRALAZINE HYDROCHLORIDE 50 MG/1
50 TABLET, FILM COATED ORAL EVERY 8 HOURS SCHEDULED
Qty: 90 TABLET | Refills: 3 | Status: SHIPPED | OUTPATIENT
Start: 2023-09-14 | End: 2023-09-14 | Stop reason: SDUPTHER

## 2023-09-14 RX ORDER — MAGNESIUM SULFATE IN WATER 40 MG/ML
4000 INJECTION, SOLUTION INTRAVENOUS ONCE
Status: COMPLETED | OUTPATIENT
Start: 2023-09-14 | End: 2023-09-14

## 2023-09-14 RX ORDER — LANOLIN ALCOHOL/MO/W.PET/CERES
400 CREAM (GRAM) TOPICAL DAILY
Status: DISCONTINUED | OUTPATIENT
Start: 2023-09-14 | End: 2023-09-14 | Stop reason: HOSPADM

## 2023-09-14 RX ORDER — THIAMINE HYDROCHLORIDE 100 MG/ML
100 INJECTION, SOLUTION INTRAMUSCULAR; INTRAVENOUS DAILY
Qty: 1 EACH | Refills: 0 | Status: SHIPPED | OUTPATIENT
Start: 2023-09-14 | End: 2023-09-14 | Stop reason: HOSPADM

## 2023-09-14 RX ADMIN — ACETAMINOPHEN 650 MG: 325 TABLET ORAL at 05:50

## 2023-09-14 RX ADMIN — NIFEDIPINE 90 MG: 30 TABLET, FILM COATED, EXTENDED RELEASE ORAL at 09:09

## 2023-09-14 RX ADMIN — LEVETIRACETAM 500 MG: 500 TABLET, FILM COATED ORAL at 09:09

## 2023-09-14 RX ADMIN — SODIUM CHLORIDE, PRESERVATIVE FREE 10 ML: 5 INJECTION INTRAVENOUS at 09:10

## 2023-09-14 RX ADMIN — LABETALOL HYDROCHLORIDE 10 MG: 5 INJECTION, SOLUTION INTRAVENOUS at 12:34

## 2023-09-14 RX ADMIN — THIAMINE HYDROCHLORIDE 100 MG: 100 INJECTION, SOLUTION INTRAMUSCULAR; INTRAVENOUS at 09:09

## 2023-09-14 RX ADMIN — HYDRALAZINE HYDROCHLORIDE 50 MG: 50 TABLET, FILM COATED ORAL at 05:50

## 2023-09-14 RX ADMIN — MAGNESIUM SULFATE HEPTAHYDRATE 4000 MG: 40 INJECTION, SOLUTION INTRAVENOUS at 09:21

## 2023-09-14 RX ADMIN — Medication 400 MG: at 09:09

## 2023-09-14 ASSESSMENT — PAIN DESCRIPTION - DESCRIPTORS: DESCRIPTORS: ACHING

## 2023-09-14 ASSESSMENT — PAIN DESCRIPTION - FREQUENCY: FREQUENCY: INTERMITTENT

## 2023-09-14 ASSESSMENT — PAIN - FUNCTIONAL ASSESSMENT: PAIN_FUNCTIONAL_ASSESSMENT: ACTIVITIES ARE NOT PREVENTED

## 2023-09-14 ASSESSMENT — PAIN DESCRIPTION - LOCATION: LOCATION: HEAD

## 2023-09-14 ASSESSMENT — PAIN DESCRIPTION - PAIN TYPE: TYPE: ACUTE PAIN

## 2023-09-14 ASSESSMENT — PAIN DESCRIPTION - ORIENTATION: ORIENTATION: LEFT;ANTERIOR

## 2023-09-14 ASSESSMENT — PAIN DESCRIPTION - ONSET: ONSET: GRADUAL

## 2023-09-14 ASSESSMENT — PAIN SCALES - GENERAL
PAINLEVEL_OUTOF10: 8
PAINLEVEL_OUTOF10: 0

## 2023-09-14 NOTE — PLAN OF CARE
Problem: Discharge Planning  Goal: Discharge to home or other facility with appropriate resources  9/14/2023 0222 by Melody Mccauley RN  Outcome: Progressing  Flowsheets (Taken 9/13/2023 1100 by Judi Francis RN)  Discharge to home or other facility with appropriate resources: Identify barriers to discharge with patient and caregiver  9/13/2023 1747 by Judi Francis RN  Outcome: Progressing  Flowsheets (Taken 9/13/2023 1100)  Discharge to home or other facility with appropriate resources: Identify barriers to discharge with patient and caregiver     Problem: Pain  Goal: Verbalizes/displays adequate comfort level or baseline comfort level  9/14/2023 0222 by Melody Mccauley RN  Outcome: Progressing  Flowsheets (Taken 9/12/2023 1215 by Chavez Breen RN)  Verbalizes/displays adequate comfort level or baseline comfort level:   Encourage patient to monitor pain and request assistance   Administer analgesics based on type and severity of pain and evaluate response   Consider cultural and social influences on pain and pain management   Assess pain using appropriate pain scale   Implement non-pharmacological measures as appropriate and evaluate response   Notify Licensed Independent Practitioner if interventions unsuccessful or patient reports new pain  9/13/2023 1747 by Judi Francis RN  Outcome: Progressing     Problem: Safety - Adult  Goal: Free from fall injury  9/14/2023 0222 by Melody Mccauley RN  Outcome: Tal Medrano (Taken 9/13/2023 1745 by Judi Francis RN)  Free From Fall Injury: Instruct family/caregiver on patient safety  9/13/2023 1747 by Judi Francis RN  Outcome: Progressing  Flowsheets (Taken 9/13/2023 1745)  Free From Fall Injury: Instruct family/caregiver on patient safety     Problem: Skin/Tissue Integrity  Goal: Absence of new skin breakdown  Description: 1. Monitor for areas of redness and/or skin breakdown  2. Assess vascular access sites hourly  3.   Every 4-6 hours minimum:  Change oxygen

## 2023-09-14 NOTE — PROGRESS NOTES
CLINICAL PHARMACY NOTE: MEDS TO BEDS    Total # of Prescriptions Filled: 5   The following medications were delivered to the patient:  Current Discharge Medication List        START taking these medications    Details   levETIRAcetam (KEPPRA) 500 MG tablet Take 1 tablet by mouth 2 times daily  Qty: 60 tablet, Refills: 3      hydrALAZINE (APRESOLINE) 50 MG tablet Take 1 tablet by mouth every 8 hours  Qty: 90 tablet, Refills: 3      NIFEdipine (PROCARDIA XL) 30 MG extended release tablet Take 3 tablets by mouth daily  Qty: 30 tablet, Refills: 3      magnesium oxide (MAG-OX) 400 (240 Mg) MG tablet Take 1 tablet by mouth daily  Qty: 30 tablet, Refills: 3      vitamin B-1 (THIAMINE) 100 MG tablet Take 1 tablet by mouth daily  Qty: 30 tablet, Refills: 3               Additional Documentation:  Medications delivered to pt

## 2023-09-14 NOTE — PROGRESS NOTES
Pt magnesium is infusing. Tolerated AM meds well. Pt says he is ready to d/c. Wife is at bedside. Pt and wife have no questions or concerns at this time.  Electronically signed by Windy Park RN on 9/14/2023 at 9:37 AM

## 2023-09-14 NOTE — DISCHARGE SUMMARY
Hospital Discharge Summary    Patient's PCP: Max Mera  Admit Date: 9/8/2023   Discharge Date: 9/14/2023    Admitting Physician: Dr. Wilma Beckman MD  Discharge Physician: Dr. Vern Canales MD     Consults:   IP CONSULT TO NEUROLOGY  IP CONSULT TO STROKE TEAM  IP CONSULT TO INFECTIOUS DISEASES  IP CONSULT TO INFECTIOUS DISEASES  IP CONSULT TO SOCIAL WORK  IP CONSULT TO NEPHROLOGY    Brief HPI: Patient admitted with seizure    Brief hospital course: 81-year-old male with history of essential hypertension, marijuana use, tobacco use disorder, who presented to the emergency room for evaluation of confusion. He was recently admitted on 9/5/2023 following presentation with syncopal episode, at the time, there was suspicion for possible seizure. MRI-brain without contrast was concerning for T2 flair hyperintensity with cortical thickening involving the right temporal lobe extending to involve the right parietal occipital lobes. 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 He came back to the emergency room on 9/8/2023 with convulsions, with 1 episode reported by significant other, and another witnessed episode in the emergency room, after which he became confused. A repeat MRI-brain with and without contrast obtained on 9/8/2023 demonstrates enhancement within the area of right cerebral hemisphere signal abnormality, concerning for herpes encephalitis and other forms of acute encephalitis. Assessment/plan:  Generalized tonic-clonic seizure. Unclear etiology. There is concern for possible encephalitis; however, this has been ruled out. Continue Keppra per neurology recommendation. Will need close follow up with neurology service as outpatient, especially with abnormal findings on MRI-brain.   Discussed with patient about deficit. Moves extremity spontaneously. Psychiatry: Appropriate affect. Not agitated. Skin: Warm, dry with normal turgor. No rash  Capillary refill: Brisk,< 3 seconds   Peripheral Pulses: +2 palpable, equal bilaterally     Significant diagnostic studies that may require follow up:  IR LUMBAR PUNCTURE FOR DIAGNOSIS    Result Date: 9/11/2023  EXAMINATION: FLUOROSCOPIC GUIDED LUMBAR PUNCTURE 9/11/2023 3:12 pm HISTORY: ORDERING SYSTEM PROVIDED HISTORY: change in mentation concern for HSV encephalitis TECHNOLOGIST PROVIDED HISTORY: Reason for exam:->change in mentation concern for HSV  encephalitis FLUOROSCOPY DOSE AND TYPE: Radiation Exposure Index: Kerma mGy, 8.81 mGy PROCEDURE: :  Marisel King Informed consent was obtained after the risks and benefits of the procedure were discussed with the patient and all questions were answered fully. Minco protocol was observed and a standard timeout was performed. The patient was positioned prone and the back was prepped and draped in the normal sterile fashion. 1% lidocaine was used for local anesthesia. The subarachnoid space was accessed with a 20-gauge 3.5 \"spinal needle at the L4-5 level. Free flow of clear CSF was noted. Approximately 12 ml of CSF was removed and sent for analysis. The stylet was reinserted, spinal needle was removed and brief pressure was applied at the puncture site. There were no immediate complications and the patient tolerated the procedure well. Successful fluoroscopic-guided lumbar puncture. US RENAL COMPLETE    Result Date: 9/11/2023  EXAMINATION: RETROPERITONEAL ULTRASOUND OF THE KIDNEYS AND URINARY BLADDER 9/11/2023 COMPARISON: None HISTORY: ORDERING SYSTEM PROVIDED HISTORY: MARY GRACE TECHNOLOGIST PROVIDED HISTORY: Reason for exam:->MARY GRACE FINDINGS: Kidneys: The right kidney measures 10.5 cm in length and the left kidney measures 10.3 cm in length. Kidneys demonstrate normal cortical echogenicity.   No evidence of hydronephrosis or

## 2023-09-14 NOTE — CARE COORDINATION
DISCHARGE SUMMARY     DATE OF DISCHARGE: 9/14/23    DISCHARGE DESTINATION: home    TRANSPORTATION: family transport    COMMENTS: spoke with pt who reports no dc needs and understands he isn't able to drive for the next 3 months. 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   Electronically signed by LYNDSAY Sykes on 9/14/2023 at 9:57 AM

## 2023-09-14 NOTE — PROGRESS NOTES
Data- discharge order received, pt verbalized agreement to discharge, disposition to previous residence, no needs for HHC/DME. Action- discharge instructions prepared and given to pt, pt verbalized understanding. Medication information packet given r/t NEW and/or CHANGED prescriptions emphasizing name/purpose/side effects, pt verbalized understanding. Discharge instruction summary: Diet- regular, Activity- as tolerated, no driving for 3 months, Primary Care Physician as follows: Seun Cameron [phone] f/u appointment in one week, prescription medications filled and delivered to pt. Response- Pt belongings gathered, IV removed. Disposition is home (no HHC/DME needs), transported with belongings, taken to lobby via w/c w/ wife and [facility] staff, no complications.

## 2023-09-14 NOTE — PLAN OF CARE
Problem: Discharge Planning  Goal: Discharge to home or other facility with appropriate resources  9/14/2023 0222 by Kevin Abbasi RN  Outcome: Progressing  Flowsheets (Taken 9/13/2023 1100 by Enrique Arora RN)  Discharge to home or other facility with appropriate resources: Identify barriers to discharge with patient and caregiver  9/13/2023 1747 by Enrique Arora RN  Outcome: Progressing  Flowsheets (Taken 9/13/2023 1100)  Discharge to home or other facility with appropriate resources: Identify barriers to discharge with patient and caregiver     Problem: Pain  Goal: Verbalizes/displays adequate comfort level or baseline comfort level  9/14/2023 0222 by Kevin Abbasi RN  Outcome: Progressing  Flowsheets (Taken 9/12/2023 1215 by Robbi Kennedy RN)  Verbalizes/displays adequate comfort level or baseline comfort level:   Encourage patient to monitor pain and request assistance   Administer analgesics based on type and severity of pain and evaluate response   Consider cultural and social influences on pain and pain management   Assess pain using appropriate pain scale   Implement non-pharmacological measures as appropriate and evaluate response   Notify Licensed Independent Practitioner if interventions unsuccessful or patient reports new pain  9/13/2023 1747 by Enrique Arora RN  Outcome: Progressing     Problem: Safety - Adult  Goal: Free from fall injury  9/14/2023 0234 by Kevin Abbasi RN  Outcome: Progressing  [address] (Taken 9/14/2023 0223)  Free From Fall Injury: Instruct family/caregiver on patient safety  9/14/2023 0222 by Kevin Abbasi RN  Outcome: Progressing  [address] (Taken 9/13/2023 1745 by Enrique Arora RN)  Free From Fall Injury: Instruct family/caregiver on patient safety  9/13/2023 1747 by Enrique Arora RN  Outcome: Progressing  Flowsheets (Taken 9/13/2023 1745)  Free From Fall Injury: Instruct family/caregiver on patient safety     Problem: Skin/Tissue Integrity  Goal: Absence of new skin

## 2023-09-14 NOTE — PROGRESS NOTES
Interval History and plan:      He has tingling numbness in the left half of the face  Otherwise no other complaints  He has abnormal brain MRI  CSF was unrevealing  Renal function getting better to creatinine of 1  He likely has acute tubular necrosis will take several weeks for creatinine to normalize   Okay to be discharged from renal perspective  Asked him to drink plenty of water, not to take over-the-counter pain medicine except Tylenol while his kidney function is recovering  Also asked him not to take high-dose of vitamin C pills  Labs ordered in the [employer] system to do it in about a week  We will follow-up in office    Blood pressure is getting better will get even better after stopping IV fluids with salt loads  Would keep blood pressure systolic around 448 at the time of discharge and will need to bring down gradually to normal                   Assessment :     Acute Kidney Injury  Likely due to acyclovir which has been is stopped at the time of consultation  Also blood pressure went down to as low as 94 systolic on 1/6/31996-  From 2012 on presentation  Creatinine 3 on 9/10/2023  Also variable blood pressure which could be contributory  Given that he has seizure checked for CPK- unlikely to be contributing to MARY GRACE  UA-trace blood otherwise bland  Renal ultrasound- pending    Hypertension   BP: (147-188)/(92-97)  Pulse:  [83]   BP goal inpatient [phone] systolic inpatient      Seizure  Altered mental status    [facility] would like to thank Eb Mendoza MD   for opportunity to serve this patient      Please call with questions at-   24 Hrs Answering service [phone] or  7 am- 5 pm via Perfect [employer] or cell phone  Nisreen Dao MD     HPI :     Leopoldo Finn is a 47 y.o. male presented to   the hospital on 9/8/2023 with seizure and confusion. He is not known to have seizures CT scan of the brain was done which was negative.   He had MRI done which showed  Within the

## 2023-09-14 NOTE — PLAN OF CARE
Problem: Discharge Planning  Goal: Discharge to home or other facility with appropriate resources  9/14/2023 0927 by Ap Kim RN  Outcome: Progressing  9/14/2023 0222 by Haley Taylor RN  Outcome: Progressing  Flowsheets  Taken 9/13/2023 2100 by Haley Taylor RN  Discharge to home or other facility with appropriate resources: Identify barriers to discharge with patient and caregiver  Taken 9/13/2023 1100 by Ap Kim RN  Discharge to home or other facility with appropriate resources: Identify barriers to discharge with patient and caregiver     Problem: Pain  Goal: Verbalizes/displays adequate comfort level or baseline comfort level  9/14/2023 0927 by Ap Kim RN  Outcome: Progressing  Flowsheets (Taken 9/14/2023 0550 by Haley Taylor RN)  Verbalizes/displays adequate comfort level or baseline comfort level: Encourage patient to monitor pain and request assistance  9/14/2023 0222 by Haley Taylor RN  Outcome: Progressing  Flowsheets (Taken 9/12/2023 1215 by Razia Garibay RN)  Verbalizes/displays adequate comfort level or baseline comfort level:   Encourage patient to monitor pain and request assistance   Administer analgesics based on type and severity of pain and evaluate response   Consider cultural and social influences on pain and pain management   Assess pain using appropriate pain scale   Implement non-pharmacological measures as appropriate and evaluate response   Notify Licensed Independent Practitioner if interventions unsuccessful or patient reports new pain     Problem: Safety - Adult  Goal: Free from fall injury  9/14/2023 0927 by Ap Kim RN  Outcome: Progressing  Flowsheets (Taken 9/14/2023 0925)  Free From Fall Injury: Instruct family/caregiver on patient safety  9/14/2023 0234 by Haley Taylor RN  Outcome: Progressing  [address] (Taken 9/14/2023 0223)  Free From Fall Injury: Instruct family/caregiver on patient safety  9/14/2023 0222 by Haley Taylor RN  Outcome:

## 2023-09-14 NOTE — DISCHARGE INSTRUCTIONS
No driving, swimming, operation of heavy machinery or activities that may pose danger if you were to have seizure. Do not tend to individuals alone if such individuals would not be able to call for help if you were to have seizure. From Kidney doctor    Please do not take pain medicines without physician approval, since many of them can harm kidney. Acetaminophen or Tylenol is safe for kidney in recommended dosage, as well as Aspirin up to 325 mg a day. However, do not take Motrin, Aleve, Advil, Ibuprofen, celecoxib, meloxicam, high dose aspirin etc. The list is not all inclusive. Call us at [phone] if you have any questions. Please get the blood  Drawn in in 1 week  Ordered electronically through [facility]. Please drop by the labs to do it. You were seen by Dr Cherylene Carry, MD, nephrologist for chronic kidney disease acute renal failure and hypertension. As of 9/14/23- your kidney is functioning at about 45 %. We need to work together to save the remaining kidney function to avoid or delay need for dialysis.  Please call office at [phone] for follow up visits

## 2023-09-15 LAB
OLIGOCLONAL BANDS CSF QL: 0
OLIGOCLONAL BANDS SERPL QL: 0

## 2023-09-18 LAB
BACTERIA CSF CULT: NORMAL
GRAM STN SPEC: NORMAL

## 2023-09-19 LAB — REPORT: NORMAL

## 2023-10-29 ENCOUNTER — HOSPITAL ENCOUNTER (OUTPATIENT)
Age: 53
Setting detail: OBSERVATION
Discharge: HOME | End: 2023-10-29
Attending: EMERGENCY MEDICINE | Admitting: INTERNAL MEDICINE
Payer: COMMERCIAL

## 2023-10-29 ENCOUNTER — APPOINTMENT (OUTPATIENT)
Dept: CT IMAGING | Age: 53
End: 2023-10-29
Payer: COMMERCIAL

## 2023-10-29 VITALS
SYSTOLIC BLOOD PRESSURE: 121 MMHG | WEIGHT: 181.66 LBS | TEMPERATURE: 98.6 F | HEIGHT: 71 IN | DIASTOLIC BLOOD PRESSURE: 70 MMHG | OXYGEN SATURATION: 94 % | BODY MASS INDEX: 25.43 KG/M2 | HEART RATE: 101 BPM | RESPIRATION RATE: 20 BRPM

## 2023-10-29 DIAGNOSIS — R56.9 SEIZURE (HCC): Primary | ICD-10-CM

## 2023-10-29 LAB
ALBUMIN SERPL-MCNC: 4.7 G/DL (ref 3.4–5)
ALP SERPL-CCNC: 122 U/L (ref 40–129)
ALT SERPL-CCNC: 15 U/L (ref 10–40)
ANION GAP SERPL CALCULATED.3IONS-SCNC: 31 MMOL/L (ref 3–16)
AST SERPL-CCNC: 15 U/L (ref 15–37)
BACTERIA URNS QL MICRO: ABNORMAL /HPF
BASOPHILS # BLD: 0.1 K/UL (ref 0–0.2)
BASOPHILS NFR BLD: 0.9 %
BILIRUB DIRECT SERPL-MCNC: <0.2 MG/DL (ref 0–0.3)
BILIRUB INDIRECT SERPL-MCNC: NORMAL MG/DL (ref 0–1)
BILIRUB SERPL-MCNC: 0.7 MG/DL (ref 0–1)
BILIRUB UR QL STRIP.AUTO: NEGATIVE
BUN SERPL-MCNC: 14 MG/DL (ref 7–20)
CALCIUM SERPL-MCNC: 9.8 MG/DL (ref 8.3–10.6)
CHLORIDE SERPL-SCNC: 97 MMOL/L (ref 99–110)
CK SERPL-CCNC: 59 U/L (ref 39–308)
CLARITY UR: CLEAR
CO2 SERPL-SCNC: 10 MMOL/L (ref 21–32)
COLOR UR: YELLOW
CREAT SERPL-MCNC: 1.6 MG/DL (ref 0.9–1.3)
DEPRECATED RDW RBC AUTO: 12.9 % (ref 12.4–15.4)
EOSINOPHIL # BLD: 0.4 K/UL (ref 0–0.6)
EOSINOPHIL NFR BLD: 2.3 %
EPI CELLS #/AREA URNS AUTO: 0 /HPF (ref 0–5)
ETHANOLAMINE SERPL-MCNC: NORMAL MG/DL (ref 0–0.08)
GFR SERPLBLD CREATININE-BSD FMLA CKD-EPI: 51 ML/MIN/{1.73_M2}
GLUCOSE SERPL-MCNC: 223 MG/DL (ref 70–99)
GLUCOSE UR STRIP.AUTO-MCNC: NEGATIVE MG/DL
HCT VFR BLD AUTO: 51.6 % (ref 40.5–52.5)
HGB BLD-MCNC: 16.8 G/DL (ref 13.5–17.5)
HGB UR QL STRIP.AUTO: NEGATIVE
HYALINE CASTS #/AREA URNS AUTO: 9 /LPF (ref 0–8)
INR PPP: 1.06 (ref 0.84–1.16)
KETONES UR STRIP.AUTO-MCNC: ABNORMAL MG/DL
LACTATE BLDV-SCNC: 15.5 MMOL/L (ref 0.4–2)
LACTATE BLDV-SCNC: 3.1 MMOL/L (ref 0.4–2)
LEUKOCYTE ESTERASE UR QL STRIP.AUTO: NEGATIVE
LYMPHOCYTES # BLD: 4.4 K/UL (ref 1–5.1)
LYMPHOCYTES NFR BLD: 28 %
MCH RBC QN AUTO: 31 PG (ref 26–34)
MCHC RBC AUTO-ENTMCNC: 32.6 G/DL (ref 31–36)
MCV RBC AUTO: 95.1 FL (ref 80–100)
MONOCYTES # BLD: 1.1 K/UL (ref 0–1.3)
MONOCYTES NFR BLD: 7.2 %
NEUTROPHILS # BLD: 9.7 K/UL (ref 1.7–7.7)
NEUTROPHILS NFR BLD: 61.6 %
NITRITE UR QL STRIP.AUTO: NEGATIVE
NT-PROBNP SERPL-MCNC: 41 PG/ML (ref 0–124)
PH UR STRIP.AUTO: 5 [PH] (ref 5–8)
PLATELET # BLD AUTO: 415 K/UL (ref 135–450)
PMV BLD AUTO: 7.8 FL (ref 5–10.5)
POTASSIUM SERPL-SCNC: 3.8 MMOL/L (ref 3.5–5.1)
PROT SERPL-MCNC: 7.7 G/DL (ref 6.4–8.2)
PROT UR STRIP.AUTO-MCNC: 30 MG/DL
PROTHROMBIN TIME: 13.8 SEC (ref 11.5–14.8)
RBC # BLD AUTO: 5.43 M/UL (ref 4.2–5.9)
RBC CLUMPS #/AREA URNS AUTO: 2 /HPF (ref 0–4)
SODIUM SERPL-SCNC: 138 MMOL/L (ref 136–145)
SP GR UR STRIP.AUTO: 1.01 (ref 1–1.03)
UA COMPLETE W REFLEX CULTURE PNL UR: ABNORMAL
UA DIPSTICK W REFLEX MICRO PNL UR: YES
URN SPEC COLLECT METH UR: ABNORMAL
UROBILINOGEN UR STRIP-ACNC: 0.2 E.U./DL
WBC # BLD AUTO: 15.8 K/UL (ref 4–11)
WBC #/AREA URNS AUTO: 4 /HPF (ref 0–5)

## 2023-10-29 PROCEDURE — 6360000002 HC RX W HCPCS: Performed by: EMERGENCY MEDICINE

## 2023-10-29 PROCEDURE — 85610 PROTHROMBIN TIME: CPT

## 2023-10-29 PROCEDURE — 99285 EMERGENCY DEPT VISIT HI MDM: CPT

## 2023-10-29 PROCEDURE — 96372 THER/PROPH/DIAG INJ SC/IM: CPT

## 2023-10-29 PROCEDURE — 83605 ASSAY OF LACTIC ACID: CPT

## 2023-10-29 PROCEDURE — 6360000002 HC RX W HCPCS: Performed by: INTERNAL MEDICINE

## 2023-10-29 PROCEDURE — 2580000003 HC RX 258: Performed by: EMERGENCY MEDICINE

## 2023-10-29 PROCEDURE — 80076 HEPATIC FUNCTION PANEL: CPT

## 2023-10-29 PROCEDURE — 96361 HYDRATE IV INFUSION ADD-ON: CPT

## 2023-10-29 PROCEDURE — 80048 BASIC METABOLIC PNL TOTAL CA: CPT

## 2023-10-29 PROCEDURE — 82550 ASSAY OF CK (CPK): CPT

## 2023-10-29 PROCEDURE — 6370000000 HC RX 637 (ALT 250 FOR IP): Performed by: INTERNAL MEDICINE

## 2023-10-29 PROCEDURE — 83880 ASSAY OF NATRIURETIC PEPTIDE: CPT

## 2023-10-29 PROCEDURE — G0378 HOSPITAL OBSERVATION PER HR: HCPCS

## 2023-10-29 PROCEDURE — 85025 COMPLETE CBC W/AUTO DIFF WBC: CPT

## 2023-10-29 PROCEDURE — 82077 ASSAY SPEC XCP UR&BREATH IA: CPT

## 2023-10-29 PROCEDURE — 94760 N-INVAS EAR/PLS OXIMETRY 1: CPT

## 2023-10-29 PROCEDURE — 70450 CT HEAD/BRAIN W/O DYE: CPT

## 2023-10-29 PROCEDURE — 81001 URINALYSIS AUTO W/SCOPE: CPT

## 2023-10-29 PROCEDURE — 2580000003 HC RX 258: Performed by: INTERNAL MEDICINE

## 2023-10-29 PROCEDURE — 96365 THER/PROPH/DIAG IV INF INIT: CPT

## 2023-10-29 RX ORDER — SODIUM CHLORIDE 9 MG/ML
INJECTION, SOLUTION INTRAVENOUS CONTINUOUS
Status: DISCONTINUED | OUTPATIENT
Start: 2023-10-29 | End: 2023-10-30 | Stop reason: HOSPADM

## 2023-10-29 RX ORDER — MAGNESIUM HYDROXIDE/ALUMINUM HYDROXICE/SIMETHICONE 120; 1200; 1200 MG/30ML; MG/30ML; MG/30ML
30 SUSPENSION ORAL EVERY 6 HOURS PRN
Status: DISCONTINUED | OUTPATIENT
Start: 2023-10-29 | End: 2023-10-30 | Stop reason: HOSPADM

## 2023-10-29 RX ORDER — LEVETIRACETAM 500 MG/1
500 TABLET ORAL 2 TIMES DAILY
Status: DISCONTINUED | OUTPATIENT
Start: 2023-10-29 | End: 2023-10-30 | Stop reason: HOSPADM

## 2023-10-29 RX ORDER — GAUZE BANDAGE 2" X 2"
100 BANDAGE TOPICAL DAILY
Status: DISCONTINUED | OUTPATIENT
Start: 2023-10-29 | End: 2023-10-30 | Stop reason: HOSPADM

## 2023-10-29 RX ORDER — SODIUM CHLORIDE 0.9 % (FLUSH) 0.9 %
5-40 SYRINGE (ML) INJECTION EVERY 12 HOURS SCHEDULED
Status: DISCONTINUED | OUTPATIENT
Start: 2023-10-29 | End: 2023-10-30 | Stop reason: HOSPADM

## 2023-10-29 RX ORDER — LEVETIRACETAM 10 MG/ML
1000 INJECTION INTRAVASCULAR ONCE
Status: COMPLETED | OUTPATIENT
Start: 2023-10-29 | End: 2023-10-29

## 2023-10-29 RX ORDER — ACETAMINOPHEN 325 MG/1
650 TABLET ORAL EVERY 6 HOURS PRN
Status: DISCONTINUED | OUTPATIENT
Start: 2023-10-29 | End: 2023-10-30 | Stop reason: HOSPADM

## 2023-10-29 RX ORDER — LEVETIRACETAM 500 MG/1
750 TABLET ORAL 2 TIMES DAILY
Qty: 60 TABLET | Refills: 3 | Status: SHIPPED | OUTPATIENT
Start: 2023-10-29

## 2023-10-29 RX ORDER — LANOLIN ALCOHOL/MO/W.PET/CERES
400 CREAM (GRAM) TOPICAL DAILY
Status: DISCONTINUED | OUTPATIENT
Start: 2023-10-29 | End: 2023-10-30 | Stop reason: HOSPADM

## 2023-10-29 RX ORDER — ONDANSETRON 4 MG/1
4 TABLET, ORALLY DISINTEGRATING ORAL EVERY 8 HOURS PRN
Status: DISCONTINUED | OUTPATIENT
Start: 2023-10-29 | End: 2023-10-30 | Stop reason: HOSPADM

## 2023-10-29 RX ORDER — TAMSULOSIN HYDROCHLORIDE 0.4 MG/1
0.4 CAPSULE ORAL DAILY PRN
COMMUNITY

## 2023-10-29 RX ORDER — ENOXAPARIN SODIUM 100 MG/ML
40 INJECTION SUBCUTANEOUS DAILY
Status: DISCONTINUED | OUTPATIENT
Start: 2023-10-29 | End: 2023-10-30 | Stop reason: HOSPADM

## 2023-10-29 RX ORDER — SODIUM CHLORIDE 0.9 % (FLUSH) 0.9 %
5-40 SYRINGE (ML) INJECTION PRN
Status: DISCONTINUED | OUTPATIENT
Start: 2023-10-29 | End: 2023-10-30 | Stop reason: HOSPADM

## 2023-10-29 RX ORDER — POLYETHYLENE GLYCOL 3350 17 G/17G
17 POWDER, FOR SOLUTION ORAL DAILY PRN
Status: DISCONTINUED | OUTPATIENT
Start: 2023-10-29 | End: 2023-10-30 | Stop reason: HOSPADM

## 2023-10-29 RX ORDER — 0.9 % SODIUM CHLORIDE 0.9 %
1000 INTRAVENOUS SOLUTION INTRAVENOUS ONCE
Status: COMPLETED | OUTPATIENT
Start: 2023-10-29 | End: 2023-10-29

## 2023-10-29 RX ORDER — ONDANSETRON 2 MG/ML
4 INJECTION INTRAMUSCULAR; INTRAVENOUS EVERY 6 HOURS PRN
Status: DISCONTINUED | OUTPATIENT
Start: 2023-10-29 | End: 2023-10-30 | Stop reason: HOSPADM

## 2023-10-29 RX ORDER — SODIUM CHLORIDE 9 MG/ML
INJECTION, SOLUTION INTRAVENOUS PRN
Status: DISCONTINUED | OUTPATIENT
Start: 2023-10-29 | End: 2023-10-30 | Stop reason: HOSPADM

## 2023-10-29 RX ORDER — POTASSIUM CHLORIDE 20 MEQ/1
40 TABLET, EXTENDED RELEASE ORAL PRN
Status: DISCONTINUED | OUTPATIENT
Start: 2023-10-29 | End: 2023-10-30 | Stop reason: HOSPADM

## 2023-10-29 RX ORDER — POTASSIUM CHLORIDE 7.45 MG/ML
10 INJECTION INTRAVENOUS PRN
Status: DISCONTINUED | OUTPATIENT
Start: 2023-10-29 | End: 2023-10-30 | Stop reason: HOSPADM

## 2023-10-29 RX ORDER — NIFEDIPINE 30 MG/1
90 TABLET, EXTENDED RELEASE ORAL DAILY
Status: DISCONTINUED | OUTPATIENT
Start: 2023-10-29 | End: 2023-10-30 | Stop reason: HOSPADM

## 2023-10-29 RX ORDER — ACETAMINOPHEN 650 MG/1
650 SUPPOSITORY RECTAL EVERY 6 HOURS PRN
Status: DISCONTINUED | OUTPATIENT
Start: 2023-10-29 | End: 2023-10-30 | Stop reason: HOSPADM

## 2023-10-29 RX ADMIN — Medication 100 MG: at 10:58

## 2023-10-29 RX ADMIN — SODIUM CHLORIDE 1000 ML: 9 INJECTION, SOLUTION INTRAVENOUS at 03:33

## 2023-10-29 RX ADMIN — LEVETIRACETAM 1000 MG: 10 INJECTION, SOLUTION INTRAVENOUS at 03:32

## 2023-10-29 RX ADMIN — NIFEDIPINE 90 MG: 30 TABLET, EXTENDED RELEASE ORAL at 10:58

## 2023-10-29 RX ADMIN — SODIUM CHLORIDE: 9 INJECTION, SOLUTION INTRAVENOUS at 11:05

## 2023-10-29 RX ADMIN — HYDRALAZINE HYDROCHLORIDE 75 MG: 50 TABLET, FILM COATED ORAL at 14:15

## 2023-10-29 RX ADMIN — Medication 400 MG: at 10:58

## 2023-10-29 RX ADMIN — SODIUM CHLORIDE, PRESERVATIVE FREE 10 ML: 5 INJECTION INTRAVENOUS at 10:59

## 2023-10-29 RX ADMIN — LEVETIRACETAM 500 MG: 500 TABLET, FILM COATED ORAL at 10:58

## 2023-10-29 RX ADMIN — ENOXAPARIN SODIUM 40 MG: 100 INJECTION SUBCUTANEOUS at 10:58

## 2023-10-29 ASSESSMENT — LIFESTYLE VARIABLES
HOW OFTEN DO YOU HAVE A DRINK CONTAINING ALCOHOL: MONTHLY OR LESS
HOW MANY STANDARD DRINKS CONTAINING ALCOHOL DO YOU HAVE ON A TYPICAL DAY: PATIENT DOES NOT DRINK

## 2023-10-29 NOTE — ED NOTES
Handoff received from Maddie Washburn. Pt placed in gown and vital signs reassessed. Pt will not lay still and keep monitor leads in place. Seizure precautions placed. Pt has pads on side rails, lights dimmed and suction at bedside.       Maddie Mcgarry  10/29/23 9152

## 2023-10-29 NOTE — DISCHARGE INSTRUCTIONS
Followup with your Neurologist and scheduled MRI  Seek medical attention for fever, severe headache, recurrent seizures  Increase Keppra to 750mg bid

## 2023-10-29 NOTE — PROGRESS NOTES
4 Eyes Skin Assessment     NAME:  Erin Mcginnis  YOB: 1970  MEDICAL RECORD NUMBER:  [id number]    The patient is being assessed for  Admission    I agree that at least one RN has performed a thorough Head to Toe Skin Assessment on the patient. ALL assessment sites listed below have been assessed. Areas assessed by both nurses:    Head, Face, Ears, Shoulders, Back, Chest, Arms, Elbows, Hands, Sacrum. Buttock, Coccyx, Ischium, Legs. Feet and Heels, and Under Medical Devices         Does the Patient have a Wound?  No noted wound(s)       Kendell Prevention initiated by RN: Yes  Wound Care Orders initiated by RN: No    Pressure Injury (Stage 3,4, Unstageable, DTI, NWPT, and Complex wounds) if present, place Wound referral order by RN under : No    New Ostomies, if present place, Ostomy referral order under : No     Nurse 1 eSignature: Electronically signed by Jessica Ball RN on 10/29/23 at 11:42 AM EDT    **SHARE this note so that the co-signing nurse can place an eSignature**    Nurse 2 eSignature: {Esignature:[id number]}
Called to see patient because he was demanding to leave. He was admitted early today for breakthru seizure. He was seen by Neurology consultant who recommended MRI of brain as well as increasing dose of keppra to 750mg twice daily. Pt reports that he had talked to his outpatient physician regarding ongoing seizures and had a MRI scheduled for this week as outpatient. He was also told to increase his keppra to 750mg but had not done that yet, instead presented here this am.   He is quite angry that he will be unable to get the MRI until tomorrow. He also co waiting too long for staff to stop his IV from beeping. I explained that MRI would be done tomorrow, I offered to stop the IVF so he was bothered by the beeping and asked if there was anything else that I could do that would convince him to staff. He declined and was adamant that he was leaving. I recommended that he increase his keppra as was told By Neurology - he said he had \"plenty\" of keppra at home but I sent new script to his pharmacy just in case. He expressed understanding not to drive and he will has his wife at all times with him.      Julio C Swift MD
Patient adamant to leave against medical advise. Patient did not want to stay and wait for MRI the following day, Monday. Patient stated that he could just take medications at home, the same medications that he would be taking at the hospital. Patient stated \"I just want to leave\". Patient signed AMA. After Visit Summary paper provided to patient with Dr. Osbaldo Lopez to follow up with neurologist, [facility] increased dose (750 mg) 2X/day, seek medical help if patient were to experience recurrent seizures, severe headache, fever, and no driving. Patient asked for wheelchair. Transporter took patient down to lobby, significant other accompanied patient off floor.
Patient is upset that he has to wait until Monday for MRI. Patient wants to leave and go to a different hospital that would do MRI on the weekends. Message sent to hospitalist that patient wants to leave.
Pharmacy Medication Reconciliation Note     List of medications patient is currently taking is complete. Patient's status of their home medications prior to arrival to the emergency room is known. Source of information:   1. Family interview: wife  2. List of medications in Epic  3. Medication dispensing report in Epic    Notes regarding home medications:   1. Patient has taken no medications PTA. (10-29-23 at 02:34)  2. Takes tamsulosin 0.4 mg daily as needed    No current facility-administered medications on file prior to encounter.      Current Outpatient Medications on File Prior to Encounter   Medication Sig Dispense Refill    tamsulosin (FLOMAX) 0.4 MG capsule Take 1 capsule by mouth daily as needed (prn urine flow)      levETIRAcetam (KEPPRA) 500 MG tablet Take 1 tablet by mouth 2 times daily 60 tablet 3    magnesium oxide (MAG-OX) 400 (240 Mg) MG tablet Take 1 tablet by mouth daily 30 tablet 3    vitamin B-1 (THIAMINE) 100 MG tablet Take 1 tablet by mouth daily 30 tablet 3    NIFEdipine (PROCARDIA XL) 30 MG extended release tablet Take 3 tablets by mouth daily 90 tablet 3    hydrALAZINE (APRESOLINE) 50 MG tablet Take 1.5 tablets by mouth every 8 hours 135 tablet 3       Garett Gatica RP, PRS 10/29/2023  11:03 AM
Pt refused incentive spirometry. Educated pt on benefits of IS and still declined. Pt 96% on RA with clear and diminished breath sounds.
[id number]

## 2023-10-29 NOTE — CONSULTS
NEUROLOGY CONSULT  NOTE :    Reason for Consult: Seizure    History of Present Illness:  Eric Arciniega is a 47 y.o. male who is being seen in consultation at the request of Dr Maxine Anthony, Erika Jauregui MD for evaluation of seizure. History is obtained from the patient and according to him, he had acute encephalitis in 2023 and had 2 seizures and was started on levetiracetam and patient said he is complaint with the seizure medication levetiracetam and had quickness seizure yesterday and lasted for 3 minutes and patient is awake and alert and denies any headaches, vision changes, weakness, tingling numbness, dizziness or balance issues and walking. Patient was evaluated by the Neurologist, Dr. Michelle Romano 1 week ago and patient was supposed to increase the dose of levetiracetam and did not started the higher dose yet. Medical History:  Past Medical History:   Diagnosis Date    Hypertension      No past surgical history on file. Medications Prior to Admission: levETIRAcetam (KEPPRA) 500 MG tablet, Take 1 tablet by mouth 2 times daily  magnesium oxide (MAG-OX) 400 (240 Mg) MG tablet, Take 1 tablet by mouth daily  vitamin B-1 (THIAMINE) 100 MG tablet, Take 1 tablet by mouth daily  NIFEdipine (PROCARDIA XL) 30 MG extended release tablet, Take 3 tablets by mouth daily  hydrALAZINE (APRESOLINE) 50 MG tablet, Take 1.5 tablets by mouth every 8 hours  Allergies   Allergen Reactions    Iodine Swelling     IV CONTRAST DYE     No family history on file.   Social History     Tobacco Use   Smoking Status Every Day    Types: Cigarettes    Last attempt to quit: 2010    Years since quittin.3   Smokeless Tobacco Current    Types: Snuff     Social History     Substance and Sexual Activity   Drug Use No     Social History     Substance and Sexual Activity   Alcohol Use Yes    Alcohol/week: 6.0 standard drinks of alcohol    Types: 6 Cans of beer per week    Comment: social          Current

## 2023-10-29 NOTE — H&P
Hyaline Casts, UA 9 (H) 0 - 8 /LPF    WBC, UA 4 0 - 5 /HPF    RBC, UA 2 0 - 4 /HPF    Epithelial Cells, UA 0 0 - 5 /HPF   Lactic Acid    Collection Time: 10/29/23  4:45 AM   Result Value Ref Range    Lactic Acid 3.1 (H) 0.4 - 2.0 mmol/L       Vitals:   Vitals:    10/29/23 0645   BP: (!) 145/111   Pulse:    Resp:    Temp:    SpO2:        Physical Exam:     Gen: NAD. Eye: TAVARES, No Icterus  Nose: Midline, No Rhinorrhea  Oropharynx: MoistMucus membrane. Ears: B/L Symmetrical, Normal hearing. Neck: Supple, No JVD  CVS: S1-S2, RRR. Resp: CTAB, No end expiratory wheezing, No Crackles. Normal Resp effort. Abdo: BS+, S, NT/ND, No Guarding, No rigidity, no rebound  Neuro:  nonfocal sensory/motor exam. Normal Speech, CN II-XII grossly normal  Psych: AOX3. Appropriate mood and affect, Good Insight and judgement  Extrem: No  Edema, No Cyanosis, ROM normal   Skin: No rash, no subcutaneous nodule palpated    Past Medical History:      PMH:   Past Medical History:   Diagnosis Date    Hypertension        PSHX:  has no past surgical history on file. Allergies: Allergies   Allergen Reactions    Iodine Swelling     IV CONTRAST DYE       FAM HX: family history is not on file. Soc HX:   Social History     Socioeconomic History    Marital status: Single   Tobacco Use    Smoking status: Every Day     Types: Cigarettes     Last attempt to quit: 2010     Years since quittin.3    Smokeless tobacco: Current     Types: Snuff   Vaping Use    Vaping Use: Every day   Substance and Sexual Activity    Alcohol use:  Yes     Alcohol/week: 6.0 standard drinks of alcohol     Types: 6 Cans of beer per week     Comment: social    Drug use: No       Medications:     Medications: Reviewed    Electronically signed by Ross Douglas MD on 10/29/2023 at 6:59 AM

## 2023-10-29 NOTE — ED NOTES
Patient has removed cardiac leads and SaO2 probe again. Will attempt to reattach and reeducate the patient on the importance of ongoing monitoring.      Elston Severe, RN  10/29/23 2725

## 2023-10-29 NOTE — PLAN OF CARE
Problem: Discharge Planning  Goal: Discharge to home or other facility with appropriate resources  Outcome: Progressing  Flowsheets (Taken 10/29/2023 1119)  Discharge to home or other facility with appropriate resources: Identify barriers to discharge with patient and caregiver     Problem: Safety - Adult  Goal: Free from fall injury  Outcome: Progressing

## 2023-10-29 NOTE — ED NOTES
Pt meliza advised that pt is going to be admitted and room number given.       Teo Wright RN  10/29/23 5371

## 2023-10-29 NOTE — ED NOTES
Patient's significant other is going to leave for a while. She is requesting to call when we know bed assignment. Confirmed her name and phone number and is listed as contact in chart.      Ce Mcfarlane RN  10/29/23 5080

## 2023-10-29 NOTE — ED TRIAGE NOTES
Patient to ED via EMS for seizure. EMS reports patient had witnessed seizure that lasted approximately 3 minutes. IV established and 10 mg IV Versed given, with seizure controlled. Patient has history of seizure, recent diagnosis around Labor day, is current with medications.

## 2023-10-29 NOTE — DISCHARGE SUMMARY
tablet  Commonly known as: THIAMINE  Take 1 tablet by mouth daily               Where to Get Your Medications        These medications were sent to [address]      Hours: 24-hours Phone: [phone]   levETIRAcetam 500 MG tablet           Follow up Care: Follow-up Information    None          Diet:  regular    Disposition:  Pt left AMA      CONSULTATIONS: neurology    Significant Diagnostic Studies:   10/29/2023: BUN 14 mg/dL (Ref range: 7 - 20 mg/dL); Calcium 9.8 mg/dL (Ref range: 8.3 - 10.6 mg/dL); Chloride 97 mmol/L (L; Ref range: 99 - 110 mmol/L); CO2 10 mmol/L (LL; Ref range: 21 - 32 mmol/L); Creatinine 1.6 mg/dL (H; Ref range: 0.9 - 1.3 mg/dL); Glucose 223 mg/dL (H; Ref range: 70 - 99 mg/dL); Hematocrit 51.6 % (Ref range: 40.5 - 52.5 %); Hemoglobin 16.8 g/dL (Ref range: 13.5 - 17.5 g/dL); Potassium reflex Magnesium 3.8 mmol/L (Ref range: 3.5 - 5.1 mmol/L); Sodium 138 mmol/L (Ref range: 136 - 145 mmol/L)  Recent Labs     10/29/23  0253   WBC 15.8*   HGB 16.8   HCT 51.6        Recent Labs     10/29/23  0253      K 3.8   CL 97*   CO2 10*   BUN 14   CREATININE 1.6*   GLUCOSE 223*   CALCIUM 9.8     Recent Labs     10/29/23  0253   AST 15   ALT 15   ALKPHOS 122   BILITOT 0.7   PROT 7.7   LABALBU 4.7     Recent Labs     10/29/23  0253   INR 1.06   PROTIME 13.8      No results for input(s): \"IRON\", \"TIBC\", \"FERR\" in the last 72 hours. Invalid input(s): \"PSAT\"   No results for input(s): \"PH\", \"PCO2\", \"PO2\" in the last 72 hours.   Recent Labs     10/29/23  0253   CKTOTAL 61     Lab Results   Component Value Date/Time    POCGLU 83 09/10/2023 03:51 AM       Discharge time spent 25 minutes    Signed:  Sher Meyers MD  10/29/2023  6:51 PM

## 2023-10-29 NOTE — ED PROVIDER NOTES
Casts, UA 9 (*)     All other components within normal limits   CK    Narrative:     Denzel Lindsey tel. [phone],  Chemistry results called to and read back by Sofía Laird RN, 10/29/2023  03:40, by [facility]    Narrative:     Jimbo Connolly [phone],  Chemistry results called to and read back by Sofía Laird RN, 10/29/2023  03:40, by [facility]   HEPATIC FUNCTION PANEL    Narrative:     Jimbo Connolly [phone],  Chemistry results called to and read back by Sofía Laird RN, 10/29/2023  03:40, by [facility]   PROTIME-INR   LACTIC ACID       All other labs were withinnormal range or not returned as of this dictation. EMERGENCY DEPARTMENT COURSE and DIFFERENTIAL DIAGNOSIS/MDM:     PMH, Surgical Hx, FH, Social Hx reviewed by myself (ETOH usage, Tobacco usage, Drug usage reviewed by myself, no pertinent Hx)- No Pertinent Hx     Old records were reviewed by me     MDM 60-year-old male with seizure. History of idiopathic encephalitis. Versed was given. Labs and imaging as above. Patient be admitted for further inpatient evaluation. I PERSONALLY SAW THE PATIENT AND PERFORMED A SUBSTANTIVE PORTION OF THE VISIT INCLUDING ALL ASPECTS OF THE MEDICAL DECISION MAKING PROCESS. The primary clinician of record [id number] [facility]   Total Critical Caretime was 39 minutes, excluding separately reportable procedures. There was a high probability of clinically significant/life threatening deterioration in the patient's condition which required my urgent intervention. CRITICAL CARE  I personally saw the patient and independently provided 39 minutes of non-concurrent critical care out of the total shared critical care time provided. This excludes seperately billable procedures.  Critical care time was provided for patient as above that required close evaluation and/or intervention with concern for potential patient

## 2023-10-29 NOTE — ED NOTES
Patient reattached to cardiac monitor and SaO2 probe. Educated patient on need to monitor all vital signs, not just b/p. Patient verbalizes understanding.      Dimitrios Pugh RN  10/29/23 5593

## 2023-11-27 ENCOUNTER — APPOINTMENT (OUTPATIENT)
Dept: CT IMAGING | Age: 53
End: 2023-11-27
Payer: COMMERCIAL

## 2023-11-27 ENCOUNTER — APPOINTMENT (OUTPATIENT)
Dept: MRI IMAGING | Age: 53
DRG: 025 | End: 2023-11-27
Attending: INTERNAL MEDICINE
Payer: COMMERCIAL

## 2023-11-27 ENCOUNTER — APPOINTMENT (OUTPATIENT)
Dept: CT IMAGING | Age: 53
DRG: 025 | End: 2023-11-27
Attending: INTERNAL MEDICINE
Payer: COMMERCIAL

## 2023-11-27 ENCOUNTER — HOSPITAL ENCOUNTER (INPATIENT)
Age: 53
LOS: 6 days | Discharge: HOME | DRG: 025 | End: 2023-12-03
Attending: INTERNAL MEDICINE | Admitting: INTERNAL MEDICINE
Payer: COMMERCIAL

## 2023-11-27 ENCOUNTER — HOSPITAL ENCOUNTER (EMERGENCY)
Age: 53
Discharge: ANOTHER HOSPITAL | End: 2023-11-27
Attending: EMERGENCY MEDICINE
Payer: COMMERCIAL

## 2023-11-27 ENCOUNTER — APPOINTMENT (OUTPATIENT)
Dept: GENERAL RADIOLOGY | Age: 53
End: 2023-11-27
Payer: COMMERCIAL

## 2023-11-27 VITALS
BODY MASS INDEX: 23.1 KG/M2 | SYSTOLIC BLOOD PRESSURE: 136 MMHG | DIASTOLIC BLOOD PRESSURE: 86 MMHG | HEART RATE: 90 BPM | OXYGEN SATURATION: 98 % | TEMPERATURE: 98.1 F | RESPIRATION RATE: 27 BRPM | HEIGHT: 71 IN | WEIGHT: 165 LBS

## 2023-11-27 DIAGNOSIS — I62.9 INTRACRANIAL HEMORRHAGE (HCC): Primary | ICD-10-CM

## 2023-11-27 DIAGNOSIS — Z98.890 S/P CRANIOTOMY: Primary | ICD-10-CM

## 2023-11-27 DIAGNOSIS — R56.9 SEIZURE (HCC): ICD-10-CM

## 2023-11-27 DIAGNOSIS — G93.89 BRAIN MASS: ICD-10-CM

## 2023-11-27 LAB
ALBUMIN SERPL-MCNC: 4.1 G/DL (ref 3.4–5)
ALBUMIN/GLOB SERPL: 1.8 {RATIO} (ref 1.1–2.2)
ALP SERPL-CCNC: 101 U/L (ref 40–129)
ALT SERPL-CCNC: 10 U/L (ref 10–40)
AMPHETAMINES UR QL SCN>1000 NG/ML: ABNORMAL
ANION GAP SERPL CALCULATED.3IONS-SCNC: 8 MMOL/L (ref 3–16)
AST SERPL-CCNC: 10 U/L (ref 15–37)
BARBITURATES UR QL SCN>200 NG/ML: ABNORMAL
BASOPHILS # BLD: 0.1 K/UL (ref 0–0.2)
BASOPHILS NFR BLD: 1.1 %
BENZODIAZ UR QL SCN>200 NG/ML: ABNORMAL
BILIRUB SERPL-MCNC: 0.9 MG/DL (ref 0–1)
BILIRUB UR QL STRIP.AUTO: NEGATIVE
BUN SERPL-MCNC: 11 MG/DL (ref 7–20)
CALCIUM SERPL-MCNC: 9.1 MG/DL (ref 8.3–10.6)
CANNABINOIDS UR QL SCN>50 NG/ML: POSITIVE
CHLORIDE SERPL-SCNC: 105 MMOL/L (ref 99–110)
CK SERPL-CCNC: 90 U/L (ref 39–308)
CLARITY UR: CLEAR
CO2 SERPL-SCNC: 23 MMOL/L (ref 21–32)
COCAINE UR QL SCN: ABNORMAL
COLOR UR: YELLOW
CREAT SERPL-MCNC: 1 MG/DL (ref 0.9–1.3)
DEPRECATED RDW RBC AUTO: 13.2 % (ref 12.4–15.4)
DRUG SCREEN COMMENT UR-IMP: ABNORMAL
EKG ATRIAL RATE: 59 BPM
EKG ATRIAL RATE: 59 BPM
EKG DIAGNOSIS: NORMAL
EKG DIAGNOSIS: NORMAL
EKG P AXIS: 58 DEGREES
EKG P AXIS: 72 DEGREES
EKG P-R INTERVAL: 120 MS
EKG P-R INTERVAL: 124 MS
EKG Q-T INTERVAL: 410 MS
EKG Q-T INTERVAL: 418 MS
EKG QRS DURATION: 98 MS
EKG QRS DURATION: 98 MS
EKG QTC CALCULATION (BAZETT): 405 MS
EKG QTC CALCULATION (BAZETT): 413 MS
EKG R AXIS: 51 DEGREES
EKG R AXIS: 62 DEGREES
EKG T AXIS: 66 DEGREES
EKG T AXIS: 67 DEGREES
EKG VENTRICULAR RATE: 59 BPM
EKG VENTRICULAR RATE: 59 BPM
EOSINOPHIL # BLD: 0.1 K/UL (ref 0–0.6)
EOSINOPHIL NFR BLD: 2.4 %
FENTANYL SCREEN, URINE: ABNORMAL
GFR SERPLBLD CREATININE-BSD FMLA CKD-EPI: >60 ML/MIN/{1.73_M2}
GLUCOSE BLD-MCNC: 116 MG/DL
GLUCOSE BLD-MCNC: 116 MG/DL (ref 70–99)
GLUCOSE SERPL-MCNC: 109 MG/DL (ref 70–99)
GLUCOSE UR STRIP.AUTO-MCNC: NEGATIVE MG/DL
HCT VFR BLD AUTO: 45.3 % (ref 40.5–52.5)
HGB BLD-MCNC: 15.7 G/DL (ref 13.5–17.5)
HGB UR QL STRIP.AUTO: NEGATIVE
INR PPP: 1.03 (ref 0.84–1.16)
KETONES UR STRIP.AUTO-MCNC: NEGATIVE MG/DL
LEUKOCYTE ESTERASE UR QL STRIP.AUTO: NEGATIVE
LEVETIRACETAM SERPL-MCNC: 39.8 UG/ML (ref 6–46)
LYMPHOCYTES # BLD: 1 K/UL (ref 1–5.1)
LYMPHOCYTES NFR BLD: 21.5 %
MCH RBC QN AUTO: 31.2 PG (ref 26–34)
MCHC RBC AUTO-ENTMCNC: 34.7 G/DL (ref 31–36)
MCV RBC AUTO: 89.9 FL (ref 80–100)
MEDICATION DOSE-MCNC: NORMAL
METHADONE UR QL SCN>300 NG/ML: ABNORMAL
MONOCYTES # BLD: 0.5 K/UL (ref 0–1.3)
MONOCYTES NFR BLD: 9.7 %
NEUTROPHILS # BLD: 3.1 K/UL (ref 1.7–7.7)
NEUTROPHILS NFR BLD: 65.3 %
NITRITE UR QL STRIP.AUTO: NEGATIVE
OPIATES UR QL SCN>300 NG/ML: ABNORMAL
OXYCODONE UR QL SCN: ABNORMAL
PCP UR QL SCN>25 NG/ML: ABNORMAL
PERFORMED ON: ABNORMAL
PH UR STRIP.AUTO: 6.5 [PH] (ref 5–8)
PH UR STRIP: 6.5 [PH]
PLATELET # BLD AUTO: 234 K/UL (ref 135–450)
PMV BLD AUTO: 7.8 FL (ref 5–10.5)
POTASSIUM SERPL-SCNC: 3.8 MMOL/L (ref 3.5–5.1)
PROT SERPL-MCNC: 6.4 G/DL (ref 6.4–8.2)
PROT UR STRIP.AUTO-MCNC: NEGATIVE MG/DL
PROTHROMBIN TIME: 13.5 SEC (ref 11.5–14.8)
RBC # BLD AUTO: 5.04 M/UL (ref 4.2–5.9)
SODIUM SERPL-SCNC: 136 MMOL/L (ref 136–145)
SP GR UR STRIP.AUTO: 1.01 (ref 1–1.03)
TROPONIN, HIGH SENSITIVITY: <6 NG/L (ref 0–22)
UA COMPLETE W REFLEX CULTURE PNL UR: NORMAL
UA DIPSTICK W REFLEX MICRO PNL UR: NORMAL
URN SPEC COLLECT METH UR: NORMAL
UROBILINOGEN UR STRIP-ACNC: 0.2 E.U./DL
WBC # BLD AUTO: 4.7 K/UL (ref 4–11)

## 2023-11-27 PROCEDURE — 6360000002 HC RX W HCPCS: Performed by: EMERGENCY MEDICINE

## 2023-11-27 PROCEDURE — 99292 CRITICAL CARE ADDL 30 MIN: CPT

## 2023-11-27 PROCEDURE — 70553 MRI BRAIN STEM W/O & W/DYE: CPT

## 2023-11-27 PROCEDURE — 70450 CT HEAD/BRAIN W/O DYE: CPT

## 2023-11-27 PROCEDURE — 6370000000 HC RX 637 (ALT 250 FOR IP)

## 2023-11-27 PROCEDURE — 80053 COMPREHEN METABOLIC PANEL: CPT

## 2023-11-27 PROCEDURE — 80307 DRUG TEST PRSMV CHEM ANLYZR: CPT

## 2023-11-27 PROCEDURE — 2580000003 HC RX 258

## 2023-11-27 PROCEDURE — 93005 ELECTROCARDIOGRAM TRACING: CPT | Performed by: EMERGENCY MEDICINE

## 2023-11-27 PROCEDURE — 82550 ASSAY OF CK (CPK): CPT

## 2023-11-27 PROCEDURE — 85610 PROTHROMBIN TIME: CPT

## 2023-11-27 PROCEDURE — 99291 CRITICAL CARE FIRST HOUR: CPT

## 2023-11-27 PROCEDURE — 96374 THER/PROPH/DIAG INJ IV PUSH: CPT

## 2023-11-27 PROCEDURE — 70498 CT ANGIOGRAPHY NECK: CPT

## 2023-11-27 PROCEDURE — 93010 ELECTROCARDIOGRAM REPORT: CPT | Performed by: INTERNAL MEDICINE

## 2023-11-27 PROCEDURE — 6360000004 HC RX CONTRAST MEDICATION: Performed by: EMERGENCY MEDICINE

## 2023-11-27 PROCEDURE — 2000000000 HC ICU R&B

## 2023-11-27 PROCEDURE — 80177 DRUG SCRN QUAN LEVETIRACETAM: CPT

## 2023-11-27 PROCEDURE — 84484 ASSAY OF TROPONIN QUANT: CPT

## 2023-11-27 PROCEDURE — 95813 EEG EXTND MNTR 61-119 MIN: CPT | Performed by: PSYCHIATRY & NEUROLOGY

## 2023-11-27 PROCEDURE — 71045 X-RAY EXAM CHEST 1 VIEW: CPT

## 2023-11-27 PROCEDURE — 85025 COMPLETE CBC W/AUTO DIFF WBC: CPT

## 2023-11-27 PROCEDURE — 96375 TX/PRO/DX INJ NEW DRUG ADDON: CPT

## 2023-11-27 PROCEDURE — 99285 EMERGENCY DEPT VISIT HI MDM: CPT

## 2023-11-27 PROCEDURE — 70549 MR ANGIOGRAPH NECK W/O&W/DYE: CPT

## 2023-11-27 PROCEDURE — 36415 COLL VENOUS BLD VENIPUNCTURE: CPT

## 2023-11-27 PROCEDURE — 70544 MR ANGIOGRAPHY HEAD W/O DYE: CPT

## 2023-11-27 PROCEDURE — 81003 URINALYSIS AUTO W/O SCOPE: CPT

## 2023-11-27 RX ORDER — SODIUM CHLORIDE 0.9 % (FLUSH) 0.9 %
5-40 SYRINGE (ML) INJECTION PRN
Status: DISCONTINUED | OUTPATIENT
Start: 2023-11-27 | End: 2023-12-03 | Stop reason: HOSPADM

## 2023-11-27 RX ORDER — PANTOPRAZOLE SODIUM 40 MG/1
40 TABLET, DELAYED RELEASE ORAL
Status: DISCONTINUED | OUTPATIENT
Start: 2023-11-27 | End: 2023-12-03 | Stop reason: HOSPADM

## 2023-11-27 RX ORDER — GAUZE BANDAGE 2" X 2"
100 BANDAGE TOPICAL DAILY
Status: DISCONTINUED | OUTPATIENT
Start: 2023-11-28 | End: 2023-12-03 | Stop reason: HOSPADM

## 2023-11-27 RX ORDER — ONDANSETRON 2 MG/ML
4 INJECTION INTRAMUSCULAR; INTRAVENOUS ONCE
Status: COMPLETED | OUTPATIENT
Start: 2023-11-27 | End: 2023-11-27

## 2023-11-27 RX ORDER — LEVETIRACETAM 10 MG/ML
1000 INJECTION INTRAVASCULAR ONCE
Status: COMPLETED | OUTPATIENT
Start: 2023-11-27 | End: 2023-11-27

## 2023-11-27 RX ORDER — LABETALOL HYDROCHLORIDE 5 MG/ML
10 INJECTION, SOLUTION INTRAVENOUS EVERY 4 HOURS PRN
Status: DISCONTINUED | OUTPATIENT
Start: 2023-11-27 | End: 2023-11-30

## 2023-11-27 RX ORDER — ONDANSETRON 4 MG/1
4 TABLET, ORALLY DISINTEGRATING ORAL EVERY 8 HOURS PRN
Status: DISCONTINUED | OUTPATIENT
Start: 2023-11-27 | End: 2023-12-03 | Stop reason: HOSPADM

## 2023-11-27 RX ORDER — NIFEDIPINE 30 MG/1
30 TABLET, EXTENDED RELEASE ORAL 3 TIMES DAILY
Status: DISCONTINUED | OUTPATIENT
Start: 2023-11-27 | End: 2023-11-27

## 2023-11-27 RX ORDER — SODIUM CHLORIDE 9 MG/ML
INJECTION, SOLUTION INTRAVENOUS PRN
Status: DISCONTINUED | OUTPATIENT
Start: 2023-11-27 | End: 2023-12-03 | Stop reason: HOSPADM

## 2023-11-27 RX ORDER — LEVETIRACETAM 500 MG/1
1000 TABLET ORAL 2 TIMES DAILY
Status: DISCONTINUED | OUTPATIENT
Start: 2023-11-27 | End: 2023-12-01

## 2023-11-27 RX ORDER — ZIPRASIDONE MESYLATE 20 MG/ML
20 INJECTION, POWDER, LYOPHILIZED, FOR SOLUTION INTRAMUSCULAR ONCE
Status: DISCONTINUED | OUTPATIENT
Start: 2023-11-27 | End: 2023-11-27

## 2023-11-27 RX ORDER — ONDANSETRON 2 MG/ML
4 INJECTION INTRAMUSCULAR; INTRAVENOUS EVERY 6 HOURS PRN
Status: DISCONTINUED | OUTPATIENT
Start: 2023-11-27 | End: 2023-12-03 | Stop reason: HOSPADM

## 2023-11-27 RX ORDER — SODIUM CHLORIDE 0.9 % (FLUSH) 0.9 %
5-40 SYRINGE (ML) INJECTION EVERY 12 HOURS SCHEDULED
Status: DISCONTINUED | OUTPATIENT
Start: 2023-11-27 | End: 2023-12-03 | Stop reason: HOSPADM

## 2023-11-27 RX ORDER — NIFEDIPINE 30 MG/1
30 TABLET, FILM COATED, EXTENDED RELEASE ORAL DAILY
Status: DISCONTINUED | OUTPATIENT
Start: 2023-11-27 | End: 2023-12-03 | Stop reason: HOSPADM

## 2023-11-27 RX ORDER — LORAZEPAM 2 MG/ML
1 INJECTION INTRAMUSCULAR ONCE
Status: COMPLETED | OUTPATIENT
Start: 2023-11-27 | End: 2023-11-27

## 2023-11-27 RX ORDER — ACETAMINOPHEN 325 MG/1
650 TABLET ORAL EVERY 4 HOURS PRN
Status: DISCONTINUED | OUTPATIENT
Start: 2023-11-27 | End: 2023-11-30

## 2023-11-27 RX ORDER — HYDRALAZINE HYDROCHLORIDE 20 MG/ML
10 INJECTION INTRAMUSCULAR; INTRAVENOUS EVERY 6 HOURS PRN
Status: DISCONTINUED | OUTPATIENT
Start: 2023-11-27 | End: 2023-11-30

## 2023-11-27 RX ORDER — TAMSULOSIN HYDROCHLORIDE 0.4 MG/1
0.4 CAPSULE ORAL DAILY PRN
Status: DISCONTINUED | OUTPATIENT
Start: 2023-11-27 | End: 2023-11-27

## 2023-11-27 RX ADMIN — ONDANSETRON 4 MG: 2 INJECTION INTRAMUSCULAR; INTRAVENOUS at 13:04

## 2023-11-27 RX ADMIN — LEVETIRACETAM 1000 MG: 500 TABLET, FILM COATED ORAL at 20:52

## 2023-11-27 RX ADMIN — Medication 1 MG: at 13:12

## 2023-11-27 RX ADMIN — PANTOPRAZOLE SODIUM 40 MG: 40 TABLET, DELAYED RELEASE ORAL at 20:52

## 2023-11-27 RX ADMIN — NIFEDIPINE 30 MG: 30 TABLET, EXTENDED RELEASE ORAL at 21:00

## 2023-11-27 RX ADMIN — IOPAMIDOL 75 ML: 755 INJECTION, SOLUTION INTRAVENOUS at 12:20

## 2023-11-27 RX ADMIN — SODIUM CHLORIDE, PRESERVATIVE FREE 10 ML: 5 INJECTION INTRAVENOUS at 20:55

## 2023-11-27 RX ADMIN — LEVETIRACETAM 1000 MG: 10 INJECTION, SOLUTION INTRAVENOUS at 13:13

## 2023-11-27 ASSESSMENT — PAIN DESCRIPTION - ORIENTATION: ORIENTATION: MID

## 2023-11-27 ASSESSMENT — ENCOUNTER SYMPTOMS: SHORTNESS OF BREATH: 0

## 2023-11-27 ASSESSMENT — PAIN SCALES - GENERAL
PAINLEVEL_OUTOF10: 0
PAINLEVEL_OUTOF10: 4
PAINLEVEL_OUTOF10: 8

## 2023-11-27 ASSESSMENT — PAIN DESCRIPTION - LOCATION: LOCATION: HEAD

## 2023-11-27 ASSESSMENT — LIFESTYLE VARIABLES
HOW OFTEN DO YOU HAVE A DRINK CONTAINING ALCOHOL: NEVER
HOW MANY STANDARD DRINKS CONTAINING ALCOHOL DO YOU HAVE ON A TYPICAL DAY: PATIENT DOES NOT DRINK

## 2023-11-27 NOTE — H&P
ICU HISTORY AND PHYSICAL       Hospital Day: 1  ICU Day: 1                                                         Code:Full Code  Admit Date: 11/27/2023  PCP: Brandon HERNANDEZ                                  CC: L sided weakness    HISTORY OF PRESENT ILLNESS:     Mr. Eddie Arana is a 47 y.o. 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 Pt stated that he began having \"twitching\" of the L side of his face. Wife reported last known normal around 1100AM today after pt. Began texting her gibberish 1113am. EMS called at 1130am. Additionally he had some head jerking and turned head to left. Last seizure was 10/30/23 and first seizure occurred on September 4, 2023. Explains that the symptoms are similar to that episode but more severe. Pt is on Keppra 1000 BID and is compliant with medications with recent increase from 750 BID. First seizure was in September 2023. CT head w/o contrast obtained on arrival that showed questionable mass in R temporal lobe with possible tiny amount of associated hemorrhage. No midline shift. Thrombolytic therapy contraindicated. While in the ED, pt had bradycardia down to 39. Pt was given 1g Keppra IV and Ativan 1 mg IV. Bedside EEG was positive for seizure activity. Pt was transferred to [facility] ICU for closer neurological management. Per chart review: Patient was admitted to the hospital on September 8, 2023 and discharged on September 14, 2023 after presenting with a syncopal episode and possible seizure. MRI showed enhancement within the area of right cerebral hemisphere consistent with acute encephalitis. He underwent extensive workup and was treated with acyclovir. He was started on Keppra. Later admitted to the hospital on October 29, 2023 for breakthrough seizure, but ended up leaving AMA.   He was recommended to have MRI brain but left

## 2023-11-27 NOTE — ED NOTES
EDMD made aware of first 5 minutes w/ the cerebell; and notified at 1306 cerebell showed sign of seizure according to reading; EDMD placing orders to give to patient and keep cerebell in place; patient signs showed cardiac monitor reading 45 bmp and patient is lethargic and started sweating      Naomi Mixon, RN  11/27/23 600 [facility], [address], RN  11/27/23 1247

## 2023-11-27 NOTE — H&P
ICU HISTORY AND PHYSICAL       Hospital Day: 1  ICU Day: 1                                                          Code:Prior  Admit Date: (Not on file)   PCP: Vidya Coronado                                  CC: L sided weakness    HISTORY OF PRESENT ILLNESS:     Mr. Beto Sanchez is a 47 y.o. 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 Pt stated that he began having \"twitching\" of the L side of his face around 11:00AM today. Additionally has some head jerking and turns head to left. Last seizure was several weeks ago. Pt is on Keppra 750 BID and is compliant with medications. CT head w/o contrast obtained on arrival that showed questionable mass in R temporal lobe with possible tiny amount of associated hemorrhage. No midline shift. Thrombolytic therapy contraindicated. While in the ED, pt had bradycardia down to 39. Pt was given 1g Keppra IV and Ativan 1 mg IV. Bedside EEG was positive for seizure activity. Pt was transferred to [facility] ICU for closer neurological management. Per chart review: Patient was admitted to the hospital on September 8, 2023 and discharged on September 14, 2023 after presenting with a syncopal episode and possible seizure. MRI showed enhancement within the area of right cerebral hemisphere consistent with acute encephalitis. He underwent extensive workup and was treated with acyclovir. He was started on Keppra. Later admitted to the hospital on October 29, 2023 for breakthrough seizure, but ended up leaving AMA. He was recommended to have MRI brain but left prior to being it completed. Followed up with PCP on Nov 3rd with lingering symptoms. PAST HISTORY:     Past Medical History:   Diagnosis Date    Hypertension        No past surgical history on file.     SocialHistory:   The patient lives at    Alcohol: ***  Illicit drugs:

## 2023-11-27 NOTE — ED NOTES
Initiated seizure precautions. Padded side rails. Suction set up at bedside. Lights dimmed. Pt on monitor and cerebell. Door and curtain left open.       Deborah Sloan RN  11/27/23 7749

## 2023-11-27 NOTE — ED NOTES
Lunch break given to [employer]; all questions and concerns answered;     Rodger Stanton RN  11/27/23 5693

## 2023-11-27 NOTE — ED NOTES
Still no reactions noticed on patient or reported by patient to RN after small bolus of iodine was injected prior to knowing about allergy;      Carson Zhang RN  11/27/23 0084

## 2023-11-27 NOTE — ED NOTES
EDMD at bedside updating pt on plan of care, room assignment, and pickup time.      Reyna Scherer RN  11/27/23 2654

## 2023-11-27 NOTE — PROGRESS NOTES
[facility] Hospitalist brief note  Consult received. Case reviewed with ER physician    Full note to follow.     Called by transfer center about pt having seizures and found to have [address] on [address]    Transfer to [facility] for neuro critical care     Please contact neuro-critical care team and icu team and hospitalist upon arrival      Diomedes Fox MD

## 2023-11-27 NOTE — ED NOTES
EDMD at bedside assessing patient and discussing plan of care w/ patient and family      Kate Chaudhari RN  11/27/23 [phone]

## 2023-11-27 NOTE — ED NOTES
Rn called EDMD d/t patient monitor showing bradycardia and patient telling this RN and other Rn that he is feeling weird and like he is going to get sick; other RN hooked up suction and placed patient on zol pads; new EKG was ordered to be preformed;     [facility] states he will be en route to room for assessment      Agnieszka Friday, RUTH  11/27/23 1326       Agnieszka Friday, RUTH  11/27/23 5065

## 2023-11-27 NOTE — ED NOTES
First care at bedside, acknowledged report and had no further questions at this time     Dimitrios Hutchinson, [address]  11/27/23 3210

## 2023-11-27 NOTE — ED NOTES
Headband: removed  Date/Time: 1430  Recorder: recording stopped  Skin: intact  Highest Seizure [place] Percentage past hour: 0% @ 1hr and 20 minutes of running patient refused to keep band on any longer and Dr. Kari Klein was okay w/ removing band. General info regarding Seizure [place] %:  Minimum duration of study is 2 hours. If Seizure [place] has remained 0% throughout the entire 2-hour duration, communicate with provider to stop the recording. Seizure [place] 0-10% - Continue to monitor and complete 2-hour study. Seizure [place] 11-89% - Epileptiform activity present. Notify provider for next steps. Seizure [place] >/= 90% - Epileptiform activity consistent with Status Epilepticus. Immediately notify provider. *Patients with Seizure [place] above 10% that persists may require a study longer than 2 hours. Maximum recording duration is 24 hours. Please update provider with a persistent increase in Seizure [place] above 10%.         Jorge A Trent RN  11/27/23 6469

## 2023-11-27 NOTE — ED NOTES
CT infiltrated PIV that was placed w/ a small dose of CT contrast prior to knowing of allergy to Iodine; warm compress was placed and right forearm elevated where PIV was; no other reactions to the infiltration or allergy reactions noticed immediatly after iodine was injected     EDMD made aware of infiltration and allergy, EDMD informed not to proceed w/ any more CT scans w/ contrast unless informed          Rut Killian, RUTH  11/27/23 [address], [address], RN  11/27/23 1884

## 2023-11-27 NOTE — ED PROVIDER NOTES
[address] [facility] [address] [address]      Pt Name: Imtiaz Hsu  MRN: [id number]  [address] 1970  Date of evaluation: 11/27/2023  Provider: Rj Berger DO    CHIEF COMPLAINT       Chief Complaint   Patient presents with    Cerebrovascular Accident     Pt reports left arm weakness and head turning to left side beginning today 1100. Pt reports hx of seizures- last one 2 weeks ago. Pt is on keppra. HISTORY OF PRESENT ILLNESS   (Location/Symptom, Timing/Onset, Context/Setting, Quality, Duration, Modifying Factors, Severity)  Note limiting factors. Imtiaz Hsu is a 47 y.o. male who presents to the emergency department with a complaint of possible stroke. The patient reports that at 11 AM today he suddenly felt some twitching in the left side of his face. He describes a scenario in which his head was jerking and turning to the left repetitively. He denies any previous occurrence. He does report a mild headache. He thought that he may be getting ready to have a seizure. He does have a history of seizures in the past the last being several weeks ago. He is medicated on Keppra and is compliant with his medications. He denies any prior history of stroke. Medical history is significant for seizure, encephalitis, hypertension, tobacco use, HSV encephalitis. He denies any neck pain or stiffness. He denies any fever or chills. He denies any chest pain or shortness of breath. He denies any abdominal pain nausea vomiting or diarrhea. He denies any recent illness. No cough or cold symptoms. He denies any dysuria hematuria frequency or urgency. He denies any back or flank pain. He does not take any anticoagulants. Nursing Notes were reviewed. HPI        REVIEW OF SYSTEMS    (2-9 systems for level 4, 10 or more for level 5)       Constitutional: Negative for fever or chills. HENT: Negative for rhinorrhea and sore throat.

## 2023-11-27 NOTE — ED NOTES
Headband: applied  Date/Time: 8857 11/27/2023  Recorder: recording started  Skin: intact  Highest Seizure [place] Percentage past hour: 87% @ first 5 minutes      General info regarding Seizure [place] %:  Minimum duration of study is 2 hours. If Seizure [place] has remained 0% throughout the entire 2-hour duration, communicate with provider to stop the recording. Seizure [place] 0-10% - Continue to monitor and complete 2-hour study. Seizure [place] 11-89% - Epileptiform activity present. Notify provider for next steps. Seizure [place] >/= 90% - Epileptiform activity consistent with Status Epilepticus. Immediately notify provider. *Patients with Seizure [place] above 10% that persists may require a study longer than 2 hours. Maximum recording duration is 24 hours. Please update provider with a persistent increase in Seizure [place] above 10%.         Florinda Spatz, RN  11/27/23 [id number] [address]  11/27/23 9674

## 2023-11-27 NOTE — CONSULTS
Stroke Team Consult Note     Stroke Team consult received 11/27/23 at 1200 for patient with acute L facial droop/L arm weakness. Shan Saxena is a 47 y.o. 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 Last known well (LKW) 11/27/23 at 1100 when patient had facial twitching followed by L facial droop/L arm weakness. Initial /79, . CT head with hypodensity in R temporal lobe with associated hyperintensity c/f hemorrhage. Had a history of \"swelling\" due to iodinated contrast, and CTA head/neck not pursued due to low concern for large-vessel occlusion, and rather suspect symptoms the result of R temporal lesion and seizure. Not a candidate for thrombolytic due to hemorrhage on CT head (discussed with Radiologist Dr. Izzy Anderson). Recommend local Neurology consult.   Pursue MRI brain w/ & w/o contrast to characterize lesion  Obtain MRA head w/contrast and MRA neck w/o contrast

## 2023-11-27 NOTE — ED NOTES
Called stroke team [phone] at 1200 Dr. Serina Allison called back [phone]  11/27/23 1222

## 2023-11-27 NOTE — ED NOTES
Patient arrived via squad for left sided weakness. At ll am the patient was normal and the wife left to get lunch. At 1113am the patient text her gibberish, she returned home at 1130 and called the squad. He arrived with slight left sided weakness and his head to the left. He would look to the right but will not keep head midline. Patient is answering questions mostly normal, but delayed. Per squad wife told them he has a recent seizure history and is on Keppra. He was recently seen at [facility]. Wife is not here in the ED at this time.          Tuan Polk RN  11/27/23 5922

## 2023-11-28 ENCOUNTER — APPOINTMENT (OUTPATIENT)
Dept: CT IMAGING | Age: 53
DRG: 025 | End: 2023-11-28
Attending: INTERNAL MEDICINE
Payer: COMMERCIAL

## 2023-11-28 PROBLEM — G93.89 BRAIN MASS: Status: ACTIVE | Noted: 2023-11-28

## 2023-11-28 PROBLEM — G40.309 NONINTRACTABLE GENERALIZED IDIOPATHIC EPILEPSY WITHOUT STATUS EPILEPTICUS (HCC): Status: ACTIVE | Noted: 2023-11-28

## 2023-11-28 LAB
ALBUMIN SERPL-MCNC: 4.4 G/DL (ref 3.4–5)
ANION GAP SERPL CALCULATED.3IONS-SCNC: 12 MMOL/L (ref 3–16)
BUN SERPL-MCNC: 10 MG/DL (ref 7–20)
CALCIUM SERPL-MCNC: 9.9 MG/DL (ref 8.3–10.6)
CHLORIDE SERPL-SCNC: 104 MMOL/L (ref 99–110)
CHOLEST SERPL-MCNC: 172 MG/DL (ref 0–199)
CO2 SERPL-SCNC: 20 MMOL/L (ref 21–32)
CREAT SERPL-MCNC: 1.1 MG/DL (ref 0.9–1.3)
CRP SERPL-MCNC: <3 MG/L (ref 0–5.1)
DEPRECATED RDW RBC AUTO: 12.9 % (ref 12.4–15.4)
ERYTHROCYTE [SEDIMENTATION RATE] IN BLOOD BY WESTERGREN METHOD: 7 MM/HR (ref 0–20)
GFR SERPLBLD CREATININE-BSD FMLA CKD-EPI: >60 ML/MIN/{1.73_M2}
GLUCOSE SERPL-MCNC: 112 MG/DL (ref 70–99)
HCT VFR BLD AUTO: 47 % (ref 40.5–52.5)
HDLC SERPL-MCNC: 27 MG/DL (ref 40–60)
HGB BLD-MCNC: 16.5 G/DL (ref 13.5–17.5)
LDLC SERPL CALC-MCNC: 121 MG/DL
MAGNESIUM SERPL-MCNC: 2.1 MG/DL (ref 1.8–2.4)
MCH RBC QN AUTO: 31.5 PG (ref 26–34)
MCHC RBC AUTO-ENTMCNC: 35.2 G/DL (ref 31–36)
MCV RBC AUTO: 89.4 FL (ref 80–100)
PHOSPHATE SERPL-MCNC: 2.9 MG/DL (ref 2.5–4.9)
PLATELET # BLD AUTO: 259 K/UL (ref 135–450)
PMV BLD AUTO: 7.5 FL (ref 5–10.5)
POTASSIUM SERPL-SCNC: 4.1 MMOL/L (ref 3.5–5.1)
RBC # BLD AUTO: 5.26 M/UL (ref 4.2–5.9)
SODIUM SERPL-SCNC: 136 MMOL/L (ref 136–145)
TRIGL SERPL-MCNC: 121 MG/DL (ref 0–150)
TSH SERPL DL<=0.005 MIU/L-ACNC: 1.35 UIU/ML (ref 0.27–4.2)
VLDLC SERPL CALC-MCNC: 24 MG/DL
WBC # BLD AUTO: 8.3 K/UL (ref 4–11)

## 2023-11-28 PROCEDURE — 6360000002 HC RX W HCPCS

## 2023-11-28 PROCEDURE — 92610 EVALUATE SWALLOWING FUNCTION: CPT

## 2023-11-28 PROCEDURE — 85652 RBC SED RATE AUTOMATED: CPT

## 2023-11-28 PROCEDURE — 86140 C-REACTIVE PROTEIN: CPT

## 2023-11-28 PROCEDURE — 97535 SELF CARE MNGMENT TRAINING: CPT

## 2023-11-28 PROCEDURE — 6370000000 HC RX 637 (ALT 250 FOR IP)

## 2023-11-28 PROCEDURE — 97166 OT EVAL MOD COMPLEX 45 MIN: CPT

## 2023-11-28 PROCEDURE — 87390 HIV-1 AG IA: CPT

## 2023-11-28 PROCEDURE — 93306 TTE W/DOPPLER COMPLETE: CPT

## 2023-11-28 PROCEDURE — 2580000003 HC RX 258

## 2023-11-28 PROCEDURE — 80069 RENAL FUNCTION PANEL: CPT

## 2023-11-28 PROCEDURE — 2000000000 HC ICU R&B

## 2023-11-28 PROCEDURE — 85027 COMPLETE CBC AUTOMATED: CPT

## 2023-11-28 PROCEDURE — 86702 HIV-2 ANTIBODY: CPT

## 2023-11-28 PROCEDURE — 80061 LIPID PANEL: CPT

## 2023-11-28 PROCEDURE — APPNB30 APP NON BILLABLE TIME 0-30 MINS: Performed by: NURSE PRACTITIONER

## 2023-11-28 PROCEDURE — 99233 SBSQ HOSP IP/OBS HIGH 50: CPT | Performed by: PSYCHIATRY & NEUROLOGY

## 2023-11-28 PROCEDURE — 86701 HIV-1ANTIBODY: CPT

## 2023-11-28 PROCEDURE — 71250 CT THORAX DX C-: CPT

## 2023-11-28 PROCEDURE — 99223 1ST HOSP IP/OBS HIGH 75: CPT | Performed by: INTERNAL MEDICINE

## 2023-11-28 PROCEDURE — 84443 ASSAY THYROID STIM HORMONE: CPT

## 2023-11-28 PROCEDURE — 97116 GAIT TRAINING THERAPY: CPT

## 2023-11-28 PROCEDURE — 83036 HEMOGLOBIN GLYCOSYLATED A1C: CPT

## 2023-11-28 PROCEDURE — 6360000004 HC RX CONTRAST MEDICATION

## 2023-11-28 PROCEDURE — 6360000002 HC RX W HCPCS: Performed by: NURSE PRACTITIONER

## 2023-11-28 PROCEDURE — 83735 ASSAY OF MAGNESIUM: CPT

## 2023-11-28 PROCEDURE — 97162 PT EVAL MOD COMPLEX 30 MIN: CPT

## 2023-11-28 PROCEDURE — 36415 COLL VENOUS BLD VENIPUNCTURE: CPT

## 2023-11-28 PROCEDURE — A9576 INJ PROHANCE MULTIPACK: HCPCS

## 2023-11-28 RX ORDER — 0.9 % SODIUM CHLORIDE 0.9 %
20 INTRAVENOUS SOLUTION INTRAVENOUS ONCE
Status: COMPLETED | OUTPATIENT
Start: 2023-11-28 | End: 2023-11-28

## 2023-11-28 RX ORDER — DEXAMETHASONE SODIUM PHOSPHATE 4 MG/ML
4 INJECTION, SOLUTION INTRA-ARTICULAR; INTRALESIONAL; INTRAMUSCULAR; INTRAVENOUS; SOFT TISSUE EVERY 6 HOURS
Status: DISCONTINUED | OUTPATIENT
Start: 2023-11-28 | End: 2023-12-01

## 2023-11-28 RX ORDER — NIFEDIPINE 30 MG/1
30 TABLET, FILM COATED, EXTENDED RELEASE ORAL DAILY
Status: ON HOLD | COMMUNITY

## 2023-11-28 RX ADMIN — GADOTERIDOL 16 ML: 279.3 INJECTION, SOLUTION INTRAVENOUS at 00:15

## 2023-11-28 RX ADMIN — SODIUM CHLORIDE, PRESERVATIVE FREE 10 ML: 5 INJECTION INTRAVENOUS at 21:06

## 2023-11-28 RX ADMIN — NIFEDIPINE 30 MG: 30 TABLET, EXTENDED RELEASE ORAL at 09:37

## 2023-11-28 RX ADMIN — DEXAMETHASONE SODIUM PHOSPHATE 4 MG: 4 INJECTION, SOLUTION INTRAMUSCULAR; INTRAVENOUS at 16:10

## 2023-11-28 RX ADMIN — DEXAMETHASONE SODIUM PHOSPHATE 4 MG: 4 INJECTION, SOLUTION INTRAMUSCULAR; INTRAVENOUS at 21:06

## 2023-11-28 RX ADMIN — LEVETIRACETAM 1000 MG: 500 TABLET, FILM COATED ORAL at 09:36

## 2023-11-28 RX ADMIN — SODIUM CHLORIDE 20 ML: 900 INJECTION, SOLUTION INTRAVENOUS at 00:16

## 2023-11-28 RX ADMIN — LABETALOL HYDROCHLORIDE 10 MG: 5 INJECTION, SOLUTION INTRAVENOUS at 21:19

## 2023-11-28 RX ADMIN — DEXAMETHASONE SODIUM PHOSPHATE 4 MG: 4 INJECTION, SOLUTION INTRAMUSCULAR; INTRAVENOUS at 10:29

## 2023-11-28 RX ADMIN — SODIUM CHLORIDE, PRESERVATIVE FREE 10 ML: 5 INJECTION INTRAVENOUS at 09:38

## 2023-11-28 RX ADMIN — LEVETIRACETAM 1000 MG: 500 TABLET, FILM COATED ORAL at 21:06

## 2023-11-28 RX ADMIN — PANTOPRAZOLE SODIUM 40 MG: 40 TABLET, DELAYED RELEASE ORAL at 06:59

## 2023-11-28 RX ADMIN — ACETAMINOPHEN 650 MG: 325 TABLET ORAL at 06:59

## 2023-11-28 RX ADMIN — Medication 100 MG: at 09:36

## 2023-11-28 ASSESSMENT — PAIN SCALES - GENERAL
PAINLEVEL_OUTOF10: 0
PAINLEVEL_OUTOF10: 7
PAINLEVEL_OUTOF10: 0
PAINLEVEL_OUTOF10: 0
PAINLEVEL_OUTOF10: 8
PAINLEVEL_OUTOF10: 0

## 2023-11-28 ASSESSMENT — PAIN DESCRIPTION - ONSET: ONSET: GRADUAL

## 2023-11-28 ASSESSMENT — PAIN DESCRIPTION - LOCATION
LOCATION: HEAD
LOCATION: HEAD

## 2023-11-28 ASSESSMENT — PAIN DESCRIPTION - FREQUENCY: FREQUENCY: CONTINUOUS

## 2023-11-28 ASSESSMENT — PAIN DESCRIPTION - ORIENTATION: ORIENTATION: MID;POSTERIOR

## 2023-11-28 ASSESSMENT — PAIN - FUNCTIONAL ASSESSMENT: PAIN_FUNCTIONAL_ASSESSMENT: ACTIVITIES ARE NOT PREVENTED

## 2023-11-28 ASSESSMENT — PAIN DESCRIPTION - PAIN TYPE: TYPE: CHRONIC PAIN

## 2023-11-28 ASSESSMENT — PAIN DESCRIPTION - DESCRIPTORS: DESCRIPTORS: ACHING

## 2023-11-28 NOTE — PLAN OF CARE
Problem: Discharge Planning  Goal: Discharge to home or other facility with appropriate resources  11/28/2023 0703 by Addi Gabriel  Outcome: Progressing     Problem: Pain  Goal: Verbalizes/displays adequate comfort level or baseline comfort level  11/28/2023 0703 by VASILE Hart  Outcome: Progressing     Problem: Neurosensory - Adult  Goal: Achieves stable or improved neurological status  Outcome: Progressing     Problem: Neurosensory - Adult  Goal: Absence of seizures  Outcome: Progressing     Problem: Neurosensory - Adult  Goal: Remains free of injury related to seizures activity  Outcome: Progressing     Problem: Neurosensory - Adult  Goal: Achieves maximal functionality and self care  Outcome: Progressing

## 2023-11-28 NOTE — PLAN OF CARE
Problem: Discharge Planning  Goal: Discharge to home or other facility with appropriate resources  Outcome: Progressing  Problem: Pain  Goal: Verbalizes/displays adequate comfort level or baseline comfort level  Outcome: Progressing

## 2023-11-28 NOTE — CARE COORDINATION
Case management is following peripherally for discharge planning. The chart was reviewed. Mr. Rui Kyle is from home. He is independent with self care and functional mobility at baseline. It is anticipated he will return home with no new needs. Cindra Lesch, RN  Case Management  [phone]    Case Management Assessment  Initial Evaluation    Date/Time of Evaluation: 11/28/2023 12:33 PM  Assessment Completed by: Татьяна Elizabeth RN    If patient is discharged prior to next notation, then this note serves as note for discharge by case management. Patient Name: Kristin Yin                   YOB: 1970  Diagnosis: Intracranial hemorrhage ([address]) [I62.9]                   Date / Time: 11/27/2023  6:23 PM    Patient Admission Status: Inpatient   Readmission Risk (Low < 19, Mod (19-27), High > 27): Readmission Risk Score: 18.3    Current PCP: Remy Ugarte  PCP verified by CM?  Yes    Chart Reviewed: Yes      History Provided by: Patient  Patient Orientation: Alert and Oriented    Patient Cognition: Alert    Hospitalization in the last 30 days (Readmission):  Yes    Readmission Assessment  Number of Days since last admission?: 8-30 days  Previous Disposition: Home with Family  Who is being Interviewed: Patient (CHART REVIEW)  What was the patient's/caregiver's perception as to why they think they needed to return back to the hospital?: Other (Comment) (seizures)  Did you visit your Primary Care Physician after you left the hospital, before you returned this time?: No  Why weren't you able to visit your PCP?: Did not have an appointment  Did you see a specialist, such as Cardiac, Pulmonary, Orthopedic Physician, etc. after you left the hospital?: No  Who advised the patient to return to the hospital?: Self-referral  Does the patient report anything that got in the way of taking their medications?: No  In our efforts to provide the best possible care to you and others like you, can you think of anything

## 2023-11-29 ENCOUNTER — ANESTHESIA EVENT (OUTPATIENT)
Dept: OPERATING ROOM | Age: 53
End: 2023-11-29
Payer: COMMERCIAL

## 2023-11-29 ENCOUNTER — APPOINTMENT (OUTPATIENT)
Dept: CT IMAGING | Age: 53
DRG: 025 | End: 2023-11-29
Attending: INTERNAL MEDICINE
Payer: COMMERCIAL

## 2023-11-29 LAB
ALBUMIN SERPL-MCNC: 4.4 G/DL (ref 3.4–5)
ANION GAP SERPL CALCULATED.3IONS-SCNC: 12 MMOL/L (ref 3–16)
BUN SERPL-MCNC: 14 MG/DL (ref 7–20)
CALCIUM SERPL-MCNC: 10.1 MG/DL (ref 8.3–10.6)
CHLORIDE SERPL-SCNC: 104 MMOL/L (ref 99–110)
CO2 SERPL-SCNC: 21 MMOL/L (ref 21–32)
CREAT SERPL-MCNC: 1.2 MG/DL (ref 0.9–1.3)
DEPRECATED RDW RBC AUTO: 12.8 % (ref 12.4–15.4)
EST. AVERAGE GLUCOSE BLD GHB EST-MCNC: 93.9 MG/DL
GFR SERPLBLD CREATININE-BSD FMLA CKD-EPI: >60 ML/MIN/{1.73_M2}
GLUCOSE SERPL-MCNC: 136 MG/DL (ref 70–99)
HBA1C MFR BLD: 4.9 %
HCT VFR BLD AUTO: 45.2 % (ref 40.5–52.5)
HGB BLD-MCNC: 15.8 G/DL (ref 13.5–17.5)
HIV 1+2 AB+HIV1 P24 AG SERPL QL IA: NORMAL
HIV 2 AB SERPL QL IA: NORMAL
HIV1 AB SERPL QL IA: NORMAL
HIV1 P24 AG SERPL QL IA: NORMAL
MAGNESIUM SERPL-MCNC: 1.9 MG/DL (ref 1.8–2.4)
MCH RBC QN AUTO: 31.4 PG (ref 26–34)
MCHC RBC AUTO-ENTMCNC: 35 G/DL (ref 31–36)
MCV RBC AUTO: 89.7 FL (ref 80–100)
PHOSPHATE SERPL-MCNC: 3.2 MG/DL (ref 2.5–4.9)
PLATELET # BLD AUTO: 245 K/UL (ref 135–450)
PMV BLD AUTO: 7.3 FL (ref 5–10.5)
POTASSIUM SERPL-SCNC: 4.2 MMOL/L (ref 3.5–5.1)
RBC # BLD AUTO: 5.04 M/UL (ref 4.2–5.9)
SODIUM SERPL-SCNC: 137 MMOL/L (ref 136–145)
WBC # BLD AUTO: 10.5 K/UL (ref 4–11)

## 2023-11-29 PROCEDURE — 6360000002 HC RX W HCPCS: Performed by: NURSE PRACTITIONER

## 2023-11-29 PROCEDURE — 99231 SBSQ HOSP IP/OBS SF/LOW 25: CPT

## 2023-11-29 PROCEDURE — 36415 COLL VENOUS BLD VENIPUNCTURE: CPT

## 2023-11-29 PROCEDURE — 80069 RENAL FUNCTION PANEL: CPT

## 2023-11-29 PROCEDURE — 85027 COMPLETE CBC AUTOMATED: CPT

## 2023-11-29 PROCEDURE — 92526 ORAL FUNCTION THERAPY: CPT

## 2023-11-29 PROCEDURE — 94761 N-INVAS EAR/PLS OXIMETRY MLT: CPT

## 2023-11-29 PROCEDURE — 6370000000 HC RX 637 (ALT 250 FOR IP)

## 2023-11-29 PROCEDURE — 83735 ASSAY OF MAGNESIUM: CPT

## 2023-11-29 PROCEDURE — 70450 CT HEAD/BRAIN W/O DYE: CPT

## 2023-11-29 PROCEDURE — 1200000000 HC SEMI PRIVATE

## 2023-11-29 PROCEDURE — 83036 HEMOGLOBIN GLYCOSYLATED A1C: CPT

## 2023-11-29 PROCEDURE — 99291 CRITICAL CARE FIRST HOUR: CPT | Performed by: NURSE PRACTITIONER

## 2023-11-29 PROCEDURE — 2580000003 HC RX 258

## 2023-11-29 PROCEDURE — 6360000002 HC RX W HCPCS

## 2023-11-29 RX ORDER — SODIUM CHLORIDE 9 MG/ML
INJECTION, SOLUTION INTRAVENOUS CONTINUOUS
Status: DISCONTINUED | OUTPATIENT
Start: 2023-11-30 | End: 2023-11-30 | Stop reason: HOSPADM

## 2023-11-29 RX ADMIN — DEXAMETHASONE SODIUM PHOSPHATE 4 MG: 4 INJECTION, SOLUTION INTRAMUSCULAR; INTRAVENOUS at 03:47

## 2023-11-29 RX ADMIN — SODIUM CHLORIDE, PRESERVATIVE FREE 10 ML: 5 INJECTION INTRAVENOUS at 20:44

## 2023-11-29 RX ADMIN — SODIUM CHLORIDE, PRESERVATIVE FREE 10 ML: 5 INJECTION INTRAVENOUS at 08:37

## 2023-11-29 RX ADMIN — DEXAMETHASONE SODIUM PHOSPHATE 4 MG: 4 INJECTION, SOLUTION INTRAMUSCULAR; INTRAVENOUS at 10:15

## 2023-11-29 RX ADMIN — NIFEDIPINE 30 MG: 30 TABLET, EXTENDED RELEASE ORAL at 08:36

## 2023-11-29 RX ADMIN — LEVETIRACETAM 1000 MG: 500 TABLET, FILM COATED ORAL at 20:37

## 2023-11-29 RX ADMIN — Medication 100 MG: at 08:36

## 2023-11-29 RX ADMIN — DEXAMETHASONE SODIUM PHOSPHATE 4 MG: 4 INJECTION, SOLUTION INTRAMUSCULAR; INTRAVENOUS at 16:30

## 2023-11-29 RX ADMIN — LEVETIRACETAM 1000 MG: 500 TABLET, FILM COATED ORAL at 08:35

## 2023-11-29 RX ADMIN — DEXAMETHASONE SODIUM PHOSPHATE 4 MG: 4 INJECTION, SOLUTION INTRAMUSCULAR; INTRAVENOUS at 20:37

## 2023-11-29 RX ADMIN — PANTOPRAZOLE SODIUM 40 MG: 40 TABLET, DELAYED RELEASE ORAL at 06:25

## 2023-11-29 ASSESSMENT — PAIN SCALES - GENERAL
PAINLEVEL_OUTOF10: 0

## 2023-11-29 NOTE — PLAN OF CARE
Problem: Discharge Planning  Goal: Discharge to home or other facility with appropriate resources  Outcome: Progressing  Flowsheets (Taken 11/28/2023 2000)  Discharge to home or other facility with appropriate resources:   Identify barriers to discharge with patient and caregiver   Arrange for needed discharge resources and transportation as appropriate   Identify discharge learning needs (meds, wound care, etc)     Problem: Pain  Goal: Verbalizes/displays adequate comfort level or baseline comfort level  Outcome: Progressing  Flowsheets (Taken 11/28/2023 2000)  Verbalizes/displays adequate comfort level or baseline comfort level:   Encourage patient to monitor pain and request assistance   Assess pain using appropriate pain scale     Problem: Neurosensory - Adult  Goal: Achieves stable or improved neurological status  Outcome: Progressing  Flowsheets (Taken 11/28/2023 2000)  Achieves stable or improved neurological status:   Assess for and report changes in neurological status   Initiate measures to prevent increased intracranial pressure   Maintain blood pressure and fluid volume within ordered parameters to optimize cerebral perfusion and minimize risk of hemorrhage   Monitor temperature, glucose, and sodium. Initiate appropriate interventions as ordered     Problem: Neurosensory - Adult  Goal: Absence of seizures  Outcome: Progressing  Flowsheets (Taken 11/28/2023 2000)  Absence of seizures:   Monitor for seizure activity.   If seizure occurs, document type and location of movements and any associated apnea   If seizure occurs, turn head to side and suction secretions as needed   Support airway/breathing, administer oxygen as needed   Administer anticonvulsants as ordered     Problem: Neurosensory - Adult  Goal: Remains free of injury related to seizures activity  Outcome: Progressing  Flowsheets (Taken 11/28/2023 2000)  Remains free of injury related to seizure activity:   Maintain airway, patient safety  and

## 2023-11-30 ENCOUNTER — APPOINTMENT (OUTPATIENT)
Dept: CT IMAGING | Age: 53
DRG: 025 | End: 2023-11-30
Attending: INTERNAL MEDICINE
Payer: COMMERCIAL

## 2023-11-30 ENCOUNTER — ANESTHESIA (OUTPATIENT)
Dept: OPERATING ROOM | Age: 53
End: 2023-11-30
Payer: COMMERCIAL

## 2023-11-30 LAB
ABO + RH BLD: NORMAL
ALBUMIN SERPL-MCNC: 4.6 G/DL (ref 3.4–5)
ANION GAP SERPL CALCULATED.3IONS-SCNC: 12 MMOL/L (ref 3–16)
APTT BLD: 24.1 SEC (ref 22.7–35.9)
BLD GP AB SCN SERPL QL: NORMAL
BUN SERPL-MCNC: 23 MG/DL (ref 7–20)
CALCIUM SERPL-MCNC: 10.1 MG/DL (ref 8.3–10.6)
CHLORIDE SERPL-SCNC: 104 MMOL/L (ref 99–110)
CO2 SERPL-SCNC: 22 MMOL/L (ref 21–32)
CREAT SERPL-MCNC: 1.3 MG/DL (ref 0.9–1.3)
DEPRECATED RDW RBC AUTO: 13.2 % (ref 12.4–15.4)
EST. AVERAGE GLUCOSE BLD GHB EST-MCNC: 91.1 MG/DL
GFR SERPLBLD CREATININE-BSD FMLA CKD-EPI: >60 ML/MIN/{1.73_M2}
GLUCOSE SERPL-MCNC: 125 MG/DL (ref 70–99)
HBA1C MFR BLD: 4.8 %
HCT VFR BLD AUTO: 46.4 % (ref 40.5–52.5)
HGB BLD-MCNC: 15.9 G/DL (ref 13.5–17.5)
INR PPP: 1.08 (ref 0.84–1.16)
MAGNESIUM SERPL-MCNC: 2.1 MG/DL (ref 1.8–2.4)
MCH RBC QN AUTO: 30.7 PG (ref 26–34)
MCHC RBC AUTO-ENTMCNC: 34.2 G/DL (ref 31–36)
MCV RBC AUTO: 89.7 FL (ref 80–100)
PHOSPHATE SERPL-MCNC: 4.6 MG/DL (ref 2.5–4.9)
PLATELET # BLD AUTO: 255 K/UL (ref 135–450)
PMV BLD AUTO: 7.6 FL (ref 5–10.5)
POTASSIUM SERPL-SCNC: 4.1 MMOL/L (ref 3.5–5.1)
PROTHROMBIN TIME: 14 SEC (ref 11.5–14.8)
RBC # BLD AUTO: 5.18 M/UL (ref 4.2–5.9)
SODIUM SERPL-SCNC: 138 MMOL/L (ref 136–145)
WBC # BLD AUTO: 11.7 K/UL (ref 4–11)

## 2023-11-30 PROCEDURE — 2720000010 HC SURG SUPPLY STERILE: Performed by: STUDENT IN AN ORGANIZED HEALTH CARE EDUCATION/TRAINING PROGRAM

## 2023-11-30 PROCEDURE — 6370000000 HC RX 637 (ALT 250 FOR IP): Performed by: STUDENT IN AN ORGANIZED HEALTH CARE EDUCATION/TRAINING PROGRAM

## 2023-11-30 PROCEDURE — C1713 ANCHOR/SCREW BN/BN,TIS/BN: HCPCS | Performed by: STUDENT IN AN ORGANIZED HEALTH CARE EDUCATION/TRAINING PROGRAM

## 2023-11-30 PROCEDURE — 6370000000 HC RX 637 (ALT 250 FOR IP): Performed by: NURSE PRACTITIONER

## 2023-11-30 PROCEDURE — 6360000002 HC RX W HCPCS: Performed by: NURSE ANESTHETIST, CERTIFIED REGISTERED

## 2023-11-30 PROCEDURE — 6370000000 HC RX 637 (ALT 250 FOR IP)

## 2023-11-30 PROCEDURE — 2580000003 HC RX 258: Performed by: NURSE PRACTITIONER

## 2023-11-30 PROCEDURE — 85027 COMPLETE CBC AUTOMATED: CPT

## 2023-11-30 PROCEDURE — 3600000004 HC SURGERY LEVEL 4 BASE: Performed by: STUDENT IN AN ORGANIZED HEALTH CARE EDUCATION/TRAINING PROGRAM

## 2023-11-30 PROCEDURE — 36415 COLL VENOUS BLD VENIPUNCTURE: CPT

## 2023-11-30 PROCEDURE — 2500000003 HC RX 250 WO HCPCS: Performed by: STUDENT IN AN ORGANIZED HEALTH CARE EDUCATION/TRAINING PROGRAM

## 2023-11-30 PROCEDURE — 86900 BLOOD TYPING SEROLOGIC ABO: CPT

## 2023-11-30 PROCEDURE — 2580000003 HC RX 258: Performed by: NURSE ANESTHETIST, CERTIFIED REGISTERED

## 2023-11-30 PROCEDURE — 6360000002 HC RX W HCPCS

## 2023-11-30 PROCEDURE — 85610 PROTHROMBIN TIME: CPT

## 2023-11-30 PROCEDURE — 3600000014 HC SURGERY LEVEL 4 ADDTL 15MIN: Performed by: STUDENT IN AN ORGANIZED HEALTH CARE EDUCATION/TRAINING PROGRAM

## 2023-11-30 PROCEDURE — 2709999900 HC NON-CHARGEABLE SUPPLY: Performed by: STUDENT IN AN ORGANIZED HEALTH CARE EDUCATION/TRAINING PROGRAM

## 2023-11-30 PROCEDURE — 80069 RENAL FUNCTION PANEL: CPT

## 2023-11-30 PROCEDURE — 86901 BLOOD TYPING SEROLOGIC RH(D): CPT

## 2023-11-30 PROCEDURE — 6360000002 HC RX W HCPCS: Performed by: STUDENT IN AN ORGANIZED HEALTH CARE EDUCATION/TRAINING PROGRAM

## 2023-11-30 PROCEDURE — 3700000000 HC ANESTHESIA ATTENDED CARE: Performed by: STUDENT IN AN ORGANIZED HEALTH CARE EDUCATION/TRAINING PROGRAM

## 2023-11-30 PROCEDURE — 1200000000 HC SEMI PRIVATE

## 2023-11-30 PROCEDURE — 99291 CRITICAL CARE FIRST HOUR: CPT

## 2023-11-30 PROCEDURE — 2500000003 HC RX 250 WO HCPCS: Performed by: NURSE PRACTITIONER

## 2023-11-30 PROCEDURE — 83735 ASSAY OF MAGNESIUM: CPT

## 2023-11-30 PROCEDURE — 88331 PATH CONSLTJ SURG 1 BLK 1SPC: CPT

## 2023-11-30 PROCEDURE — 2500000003 HC RX 250 WO HCPCS: Performed by: NURSE ANESTHETIST, CERTIFIED REGISTERED

## 2023-11-30 PROCEDURE — 3700000001 HC ADD 15 MINUTES (ANESTHESIA): Performed by: STUDENT IN AN ORGANIZED HEALTH CARE EDUCATION/TRAINING PROGRAM

## 2023-11-30 PROCEDURE — 2580000003 HC RX 258: Performed by: STUDENT IN AN ORGANIZED HEALTH CARE EDUCATION/TRAINING PROGRAM

## 2023-11-30 PROCEDURE — 7100000000 HC PACU RECOVERY - FIRST 15 MIN: Performed by: STUDENT IN AN ORGANIZED HEALTH CARE EDUCATION/TRAINING PROGRAM

## 2023-11-30 PROCEDURE — 6360000002 HC RX W HCPCS: Performed by: NURSE PRACTITIONER

## 2023-11-30 PROCEDURE — 85730 THROMBOPLASTIN TIME PARTIAL: CPT

## 2023-11-30 PROCEDURE — 7100000001 HC PACU RECOVERY - ADDTL 15 MIN: Performed by: STUDENT IN AN ORGANIZED HEALTH CARE EDUCATION/TRAINING PROGRAM

## 2023-11-30 PROCEDURE — 70450 CT HEAD/BRAIN W/O DYE: CPT

## 2023-11-30 PROCEDURE — 86850 RBC ANTIBODY SCREEN: CPT

## 2023-11-30 PROCEDURE — 00B70ZZ EXCISION OF CEREBRAL HEMISPHERE, OPEN APPROACH: ICD-10-PCS | Performed by: DENTIST

## 2023-11-30 PROCEDURE — 6360000002 HC RX W HCPCS: Performed by: ANESTHESIOLOGY

## 2023-11-30 DEVICE — SCREW UN3 SLFTP 1.5X4MM: Type: IMPLANTABLE DEVICE | Site: CRANIAL | Status: FUNCTIONAL

## 2023-11-30 DEVICE — LOW PROFILE BURR HOLE COVER, W/TAB, 14MM
Type: IMPLANTABLE DEVICE | Site: CRANIAL | Status: FUNCTIONAL
Brand: UNIVERSAL NEURO 2

## 2023-11-30 RX ORDER — LABETALOL HYDROCHLORIDE 5 MG/ML
10 INJECTION, SOLUTION INTRAVENOUS ONCE
Status: COMPLETED | OUTPATIENT
Start: 2023-11-30 | End: 2023-11-30

## 2023-11-30 RX ORDER — DEXMEDETOMIDINE HYDROCHLORIDE 100 UG/ML
INJECTION, SOLUTION INTRAVENOUS PRN
Status: DISCONTINUED | OUTPATIENT
Start: 2023-11-30 | End: 2023-11-30 | Stop reason: SDUPTHER

## 2023-11-30 RX ORDER — HYDRALAZINE HYDROCHLORIDE 20 MG/ML
10 INJECTION INTRAMUSCULAR; INTRAVENOUS EVERY 6 HOURS PRN
Status: DISCONTINUED | OUTPATIENT
Start: 2023-11-30 | End: 2023-12-03 | Stop reason: HOSPADM

## 2023-11-30 RX ORDER — PHENYLEPHRINE HYDROCHLORIDE 10 MG/ML
INJECTION INTRAVENOUS PRN
Status: DISCONTINUED | OUTPATIENT
Start: 2023-11-30 | End: 2023-11-30

## 2023-11-30 RX ORDER — LEVETIRACETAM 500 MG/5ML
INJECTION, SOLUTION, CONCENTRATE INTRAVENOUS PRN
Status: DISCONTINUED | OUTPATIENT
Start: 2023-11-30 | End: 2023-11-30 | Stop reason: SDUPTHER

## 2023-11-30 RX ORDER — LABETALOL HYDROCHLORIDE 5 MG/ML
10 INJECTION, SOLUTION INTRAVENOUS EVERY 4 HOURS PRN
Status: DISCONTINUED | OUTPATIENT
Start: 2023-11-30 | End: 2023-12-03 | Stop reason: HOSPADM

## 2023-11-30 RX ORDER — REMIFENTANIL HYDROCHLORIDE 1 MG/ML
INJECTION, POWDER, LYOPHILIZED, FOR SOLUTION INTRAVENOUS CONTINUOUS PRN
Status: DISCONTINUED | OUTPATIENT
Start: 2023-11-30 | End: 2023-11-30 | Stop reason: SDUPTHER

## 2023-11-30 RX ORDER — DIAZEPAM 5 MG/1
5 TABLET ORAL ONCE
Status: COMPLETED | OUTPATIENT
Start: 2023-11-30 | End: 2023-11-30

## 2023-11-30 RX ORDER — SODIUM CHLORIDE, SODIUM LACTATE, POTASSIUM CHLORIDE, AND CALCIUM CHLORIDE .6; .31; .03; .02 G/100ML; G/100ML; G/100ML; G/100ML
IRRIGANT IRRIGATION PRN
Status: DISCONTINUED | OUTPATIENT
Start: 2023-11-30 | End: 2023-11-30 | Stop reason: HOSPADM

## 2023-11-30 RX ORDER — PROCHLORPERAZINE EDISYLATE 5 MG/ML
5 INJECTION INTRAMUSCULAR; INTRAVENOUS
Status: DISCONTINUED | OUTPATIENT
Start: 2023-11-30 | End: 2023-11-30 | Stop reason: HOSPADM

## 2023-11-30 RX ORDER — FENTANYL CITRATE 50 UG/ML
25 INJECTION, SOLUTION INTRAMUSCULAR; INTRAVENOUS EVERY 5 MIN PRN
Status: DISCONTINUED | OUTPATIENT
Start: 2023-11-30 | End: 2023-11-30 | Stop reason: HOSPADM

## 2023-11-30 RX ORDER — LABETALOL HYDROCHLORIDE 5 MG/ML
10 INJECTION, SOLUTION INTRAVENOUS
Status: DISCONTINUED | OUTPATIENT
Start: 2023-11-30 | End: 2023-11-30 | Stop reason: HOSPADM

## 2023-11-30 RX ORDER — DIPHENHYDRAMINE HYDROCHLORIDE 50 MG/ML
12.5 INJECTION INTRAMUSCULAR; INTRAVENOUS
Status: DISCONTINUED | OUTPATIENT
Start: 2023-11-30 | End: 2023-11-30 | Stop reason: HOSPADM

## 2023-11-30 RX ORDER — PROPOFOL 10 MG/ML
INJECTION, EMULSION INTRAVENOUS CONTINUOUS PRN
Status: DISCONTINUED | OUTPATIENT
Start: 2023-11-30 | End: 2023-11-30 | Stop reason: SDUPTHER

## 2023-11-30 RX ORDER — ACETAMINOPHEN 325 MG/1
650 TABLET ORAL
Status: DISCONTINUED | OUTPATIENT
Start: 2023-11-30 | End: 2023-11-30 | Stop reason: HOSPADM

## 2023-11-30 RX ORDER — HYDROMORPHONE HYDROCHLORIDE 2 MG/ML
INJECTION, SOLUTION INTRAMUSCULAR; INTRAVENOUS; SUBCUTANEOUS PRN
Status: DISCONTINUED | OUTPATIENT
Start: 2023-11-30 | End: 2023-11-30 | Stop reason: SDUPTHER

## 2023-11-30 RX ORDER — SODIUM CHLORIDE 0.9 % (FLUSH) 0.9 %
5-40 SYRINGE (ML) INJECTION PRN
Status: DISCONTINUED | OUTPATIENT
Start: 2023-11-30 | End: 2023-11-30 | Stop reason: HOSPADM

## 2023-11-30 RX ORDER — PROPOFOL 10 MG/ML
INJECTION, EMULSION INTRAVENOUS PRN
Status: DISCONTINUED | OUTPATIENT
Start: 2023-11-30 | End: 2023-11-30 | Stop reason: SDUPTHER

## 2023-11-30 RX ORDER — BUPIVACAINE HYDROCHLORIDE AND EPINEPHRINE 5; 5 MG/ML; UG/ML
INJECTION, SOLUTION EPIDURAL; INTRACAUDAL; PERINEURAL PRN
Status: DISCONTINUED | OUTPATIENT
Start: 2023-11-30 | End: 2023-11-30 | Stop reason: HOSPADM

## 2023-11-30 RX ORDER — HYDROMORPHONE HYDROCHLORIDE 1 MG/ML
0.5 INJECTION, SOLUTION INTRAMUSCULAR; INTRAVENOUS; SUBCUTANEOUS
Status: DISPENSED | OUTPATIENT
Start: 2023-11-30 | End: 2023-12-02

## 2023-11-30 RX ORDER — OXYCODONE HYDROCHLORIDE 5 MG/1
10 TABLET ORAL EVERY 4 HOURS PRN
Status: DISCONTINUED | OUTPATIENT
Start: 2023-11-30 | End: 2023-12-03 | Stop reason: HOSPADM

## 2023-11-30 RX ORDER — FENTANYL CITRATE 50 UG/ML
INJECTION, SOLUTION INTRAMUSCULAR; INTRAVENOUS PRN
Status: DISCONTINUED | OUTPATIENT
Start: 2023-11-30 | End: 2023-11-30 | Stop reason: SDUPTHER

## 2023-11-30 RX ORDER — SUCCINYLCHOLINE CHLORIDE 20 MG/ML
INJECTION INTRAMUSCULAR; INTRAVENOUS PRN
Status: DISCONTINUED | OUTPATIENT
Start: 2023-11-30 | End: 2023-11-30 | Stop reason: SDUPTHER

## 2023-11-30 RX ORDER — DEXAMETHASONE SODIUM PHOSPHATE 4 MG/ML
INJECTION, SOLUTION INTRA-ARTICULAR; INTRALESIONAL; INTRAMUSCULAR; INTRAVENOUS; SOFT TISSUE PRN
Status: DISCONTINUED | OUTPATIENT
Start: 2023-11-30 | End: 2023-11-30 | Stop reason: SDUPTHER

## 2023-11-30 RX ORDER — SODIUM CHLORIDE, SODIUM LACTATE, POTASSIUM CHLORIDE, CALCIUM CHLORIDE 600; 310; 30; 20 MG/100ML; MG/100ML; MG/100ML; MG/100ML
INJECTION, SOLUTION INTRAVENOUS CONTINUOUS PRN
Status: DISCONTINUED | OUTPATIENT
Start: 2023-11-30 | End: 2023-11-30 | Stop reason: SDUPTHER

## 2023-11-30 RX ORDER — HYDROMORPHONE HYDROCHLORIDE 1 MG/ML
0.5 INJECTION, SOLUTION INTRAMUSCULAR; INTRAVENOUS; SUBCUTANEOUS EVERY 5 MIN PRN
Status: DISCONTINUED | OUTPATIENT
Start: 2023-11-30 | End: 2023-11-30 | Stop reason: HOSPADM

## 2023-11-30 RX ORDER — SODIUM CHLORIDE 0.9 % (FLUSH) 0.9 %
5-40 SYRINGE (ML) INJECTION EVERY 12 HOURS SCHEDULED
Status: DISCONTINUED | OUTPATIENT
Start: 2023-11-30 | End: 2023-11-30 | Stop reason: HOSPADM

## 2023-11-30 RX ORDER — ONDANSETRON 2 MG/ML
4 INJECTION INTRAMUSCULAR; INTRAVENOUS
Status: DISCONTINUED | OUTPATIENT
Start: 2023-11-30 | End: 2023-11-30 | Stop reason: HOSPADM

## 2023-11-30 RX ORDER — ACETAMINOPHEN 500 MG
1000 TABLET ORAL EVERY 6 HOURS SCHEDULED
Status: DISCONTINUED | OUTPATIENT
Start: 2023-11-30 | End: 2023-12-03 | Stop reason: HOSPADM

## 2023-11-30 RX ORDER — MEPERIDINE HYDROCHLORIDE 25 MG/ML
12.5 INJECTION INTRAMUSCULAR; INTRAVENOUS; SUBCUTANEOUS EVERY 5 MIN PRN
Status: DISCONTINUED | OUTPATIENT
Start: 2023-11-30 | End: 2023-11-30 | Stop reason: HOSPADM

## 2023-11-30 RX ORDER — LORAZEPAM 2 MG/ML
0.5 INJECTION INTRAMUSCULAR
Status: COMPLETED | OUTPATIENT
Start: 2023-11-30 | End: 2023-11-30

## 2023-11-30 RX ORDER — SODIUM CHLORIDE 9 MG/ML
INJECTION, SOLUTION INTRAVENOUS PRN
Status: DISCONTINUED | OUTPATIENT
Start: 2023-11-30 | End: 2023-11-30 | Stop reason: HOSPADM

## 2023-11-30 RX ORDER — ONDANSETRON 2 MG/ML
INJECTION INTRAMUSCULAR; INTRAVENOUS PRN
Status: DISCONTINUED | OUTPATIENT
Start: 2023-11-30 | End: 2023-11-30 | Stop reason: SDUPTHER

## 2023-11-30 RX ORDER — SODIUM CHLORIDE, SODIUM LACTATE, POTASSIUM CHLORIDE, CALCIUM CHLORIDE 600; 310; 30; 20 MG/100ML; MG/100ML; MG/100ML; MG/100ML
INJECTION, SOLUTION INTRAVENOUS CONTINUOUS
Status: ACTIVE | OUTPATIENT
Start: 2023-11-30 | End: 2023-12-01

## 2023-11-30 RX ORDER — IPRATROPIUM BROMIDE AND ALBUTEROL SULFATE 2.5; .5 MG/3ML; MG/3ML
1 SOLUTION RESPIRATORY (INHALATION)
Status: DISCONTINUED | OUTPATIENT
Start: 2023-11-30 | End: 2023-11-30 | Stop reason: HOSPADM

## 2023-11-30 RX ORDER — MANNITOL 20 G/100ML
INJECTION, SOLUTION INTRAVENOUS PRN
Status: DISCONTINUED | OUTPATIENT
Start: 2023-11-30 | End: 2023-11-30 | Stop reason: SDUPTHER

## 2023-11-30 RX ORDER — OXYCODONE HYDROCHLORIDE 5 MG/1
5 TABLET ORAL EVERY 4 HOURS PRN
Status: DISCONTINUED | OUTPATIENT
Start: 2023-11-30 | End: 2023-12-03 | Stop reason: HOSPADM

## 2023-11-30 RX ORDER — MIDAZOLAM HYDROCHLORIDE 1 MG/ML
INJECTION INTRAMUSCULAR; INTRAVENOUS PRN
Status: DISCONTINUED | OUTPATIENT
Start: 2023-11-30 | End: 2023-11-30 | Stop reason: SDUPTHER

## 2023-11-30 RX ORDER — GLYCOPYRROLATE 0.2 MG/ML
INJECTION INTRAMUSCULAR; INTRAVENOUS PRN
Status: DISCONTINUED | OUTPATIENT
Start: 2023-11-30 | End: 2023-11-30 | Stop reason: SDUPTHER

## 2023-11-30 RX ORDER — KETAMINE HCL IN NACL, ISO-OSM 20 MG/2 ML
SYRINGE (ML) INJECTION PRN
Status: DISCONTINUED | OUTPATIENT
Start: 2023-11-30 | End: 2023-11-30 | Stop reason: SDUPTHER

## 2023-11-30 RX ORDER — PHENYLEPHRINE HYDROCHLORIDE 10 MG/ML
INJECTION INTRAVENOUS PRN
Status: DISCONTINUED | OUTPATIENT
Start: 2023-11-30 | End: 2023-11-30 | Stop reason: SDUPTHER

## 2023-11-30 RX ORDER — LIDOCAINE HYDROCHLORIDE 20 MG/ML
INJECTION, SOLUTION INTRAVENOUS PRN
Status: DISCONTINUED | OUTPATIENT
Start: 2023-11-30 | End: 2023-11-30 | Stop reason: SDUPTHER

## 2023-11-30 RX ORDER — LABETALOL HYDROCHLORIDE 5 MG/ML
20 INJECTION, SOLUTION INTRAVENOUS PRN
Status: COMPLETED | OUTPATIENT
Start: 2023-11-30 | End: 2023-11-30

## 2023-11-30 RX ORDER — HYDROMORPHONE HYDROCHLORIDE 1 MG/ML
0.25 INJECTION, SOLUTION INTRAMUSCULAR; INTRAVENOUS; SUBCUTANEOUS
Status: ACTIVE | OUTPATIENT
Start: 2023-11-30 | End: 2023-12-02

## 2023-11-30 RX ADMIN — LEVETIRACETAM 1000 MG: 500 TABLET, FILM COATED ORAL at 20:13

## 2023-11-30 RX ADMIN — FENTANYL CITRATE 100 MCG: 50 INJECTION, SOLUTION INTRAMUSCULAR; INTRAVENOUS at 07:49

## 2023-11-30 RX ADMIN — Medication 30 MG: at 11:30

## 2023-11-30 RX ADMIN — HYDROMORPHONE HYDROCHLORIDE 0.4 MG: 2 INJECTION, SOLUTION INTRAMUSCULAR; INTRAVENOUS; SUBCUTANEOUS at 12:34

## 2023-11-30 RX ADMIN — GLYCOPYRROLATE 0.2 MG: 0.2 INJECTION INTRAMUSCULAR; INTRAVENOUS at 08:21

## 2023-11-30 RX ADMIN — AMINOLEVULINIC ACID HYDROCHLORIDE 1500 MG: 1500 POWDER, FOR SOLUTION ORAL at 05:57

## 2023-11-30 RX ADMIN — SUCCINYLCHOLINE CHLORIDE 120 MG: 20 INJECTION, SOLUTION INTRAMUSCULAR; INTRAVENOUS; PARENTERAL at 07:55

## 2023-11-30 RX ADMIN — LABETALOL HYDROCHLORIDE 10 MG: 5 INJECTION, SOLUTION INTRAVENOUS at 20:14

## 2023-11-30 RX ADMIN — LABETALOL HYDROCHLORIDE 10 MG: 5 INJECTION, SOLUTION INTRAVENOUS at 13:52

## 2023-11-30 RX ADMIN — OXYCODONE 5 MG: 5 TABLET ORAL at 22:12

## 2023-11-30 RX ADMIN — ACETAMINOPHEN 1000 MG: 500 TABLET ORAL at 16:54

## 2023-11-30 RX ADMIN — HYDROMORPHONE HYDROCHLORIDE 0.5 MG: 1 INJECTION, SOLUTION INTRAMUSCULAR; INTRAVENOUS; SUBCUTANEOUS at 15:00

## 2023-11-30 RX ADMIN — SODIUM CHLORIDE 2000 MG: 900 INJECTION INTRAVENOUS at 08:00

## 2023-11-30 RX ADMIN — DEXAMETHASONE SODIUM PHOSPHATE 10 MG: 4 INJECTION, SOLUTION INTRAMUSCULAR; INTRAVENOUS at 08:00

## 2023-11-30 RX ADMIN — PANTOPRAZOLE SODIUM 40 MG: 40 TABLET, DELAYED RELEASE ORAL at 05:50

## 2023-11-30 RX ADMIN — REMIFENTANIL HYDROCHLORIDE 0.2 MCG/KG/MIN: 1 INJECTION, POWDER, LYOPHILIZED, FOR SOLUTION INTRAVENOUS at 08:00

## 2023-11-30 RX ADMIN — DEXAMETHASONE SODIUM PHOSPHATE 4 MG: 4 INJECTION, SOLUTION INTRAMUSCULAR; INTRAVENOUS at 04:46

## 2023-11-30 RX ADMIN — SODIUM CHLORIDE 2000 MG: 900 INJECTION INTRAVENOUS at 12:00

## 2023-11-30 RX ADMIN — PROPOFOL 150 MCG/KG/MIN: 10 INJECTION, EMULSION INTRAVENOUS at 08:00

## 2023-11-30 RX ADMIN — SODIUM CHLORIDE, SODIUM LACTATE, POTASSIUM CHLORIDE, CALCIUM CHLORIDE: 600; 310; 30; 20 INJECTION, SOLUTION INTRAVENOUS at 08:18

## 2023-11-30 RX ADMIN — DEXAMETHASONE SODIUM PHOSPHATE 4 MG: 4 INJECTION, SOLUTION INTRAMUSCULAR; INTRAVENOUS at 16:54

## 2023-11-30 RX ADMIN — LIDOCAINE HYDROCHLORIDE 100 MG: 20 INJECTION, SOLUTION INTRAVENOUS at 07:53

## 2023-11-30 RX ADMIN — SODIUM CHLORIDE 2000 MG: 900 INJECTION INTRAVENOUS at 20:17

## 2023-11-30 RX ADMIN — SODIUM CHLORIDE: 9 INJECTION, SOLUTION INTRAVENOUS at 00:20

## 2023-11-30 RX ADMIN — DIAZEPAM 5 MG: 5 TABLET ORAL at 21:08

## 2023-11-30 RX ADMIN — HYDRALAZINE HYDROCHLORIDE 10 MG: 20 INJECTION, SOLUTION INTRAMUSCULAR; INTRAVENOUS at 17:18

## 2023-11-30 RX ADMIN — SODIUM CHLORIDE, PRESERVATIVE FREE 10 ML: 5 INJECTION INTRAVENOUS at 20:16

## 2023-11-30 RX ADMIN — MIDAZOLAM HYDROCHLORIDE 2 MG: 2 INJECTION, SOLUTION INTRAMUSCULAR; INTRAVENOUS at 07:46

## 2023-11-30 RX ADMIN — HYDRALAZINE HYDROCHLORIDE 10 MG: 20 INJECTION, SOLUTION INTRAMUSCULAR; INTRAVENOUS at 23:38

## 2023-11-30 RX ADMIN — SODIUM CHLORIDE, SODIUM LACTATE, POTASSIUM CHLORIDE, AND CALCIUM CHLORIDE: .6; .31; .03; .02 INJECTION, SOLUTION INTRAVENOUS at 06:54

## 2023-11-30 RX ADMIN — SODIUM CHLORIDE, POTASSIUM CHLORIDE, SODIUM LACTATE AND CALCIUM CHLORIDE: 600; 310; 30; 20 INJECTION, SOLUTION INTRAVENOUS at 16:53

## 2023-11-30 RX ADMIN — PROPOFOL 200 MG: 10 INJECTION, EMULSION INTRAVENOUS at 07:54

## 2023-11-30 RX ADMIN — SODIUM CHLORIDE, SODIUM LACTATE, POTASSIUM CHLORIDE, CALCIUM CHLORIDE: 600; 310; 30; 20 INJECTION, SOLUTION INTRAVENOUS at 06:54

## 2023-11-30 RX ADMIN — LEVETIRACETAM 1 G: 100 INJECTION, SOLUTION INTRAVENOUS at 08:00

## 2023-11-30 RX ADMIN — HYDROMORPHONE HYDROCHLORIDE 0.4 MG: 2 INJECTION, SOLUTION INTRAMUSCULAR; INTRAVENOUS; SUBCUTANEOUS at 13:42

## 2023-11-30 RX ADMIN — PHENYLEPHRINE HYDROCHLORIDE 50 MCG: 10 INJECTION INTRAVENOUS at 12:56

## 2023-11-30 RX ADMIN — DEXAMETHASONE SODIUM PHOSPHATE 4 MG: 4 INJECTION, SOLUTION INTRAMUSCULAR; INTRAVENOUS at 20:15

## 2023-11-30 RX ADMIN — LABETALOL HYDROCHLORIDE 20 MG: 5 INJECTION, SOLUTION INTRAVENOUS at 14:41

## 2023-11-30 RX ADMIN — LORAZEPAM 0.5 MG: 2 INJECTION INTRAMUSCULAR; INTRAVENOUS at 14:25

## 2023-11-30 RX ADMIN — ONDANSETRON 4 MG: 2 INJECTION INTRAMUSCULAR; INTRAVENOUS at 08:00

## 2023-11-30 RX ADMIN — PHENYLEPHRINE HYDROCHLORIDE 20 MCG/MIN: 50 INJECTION INTRAVENOUS at 08:22

## 2023-11-30 RX ADMIN — ACETAMINOPHEN 1000 MG: 500 TABLET ORAL at 22:12

## 2023-11-30 RX ADMIN — DEXMEDETOMIDINE HYDROCHLORIDE 4 MCG: 100 INJECTION, SOLUTION INTRAVENOUS at 12:05

## 2023-11-30 RX ADMIN — MANNITOL 37.4 G: 20 INJECTION, SOLUTION INTRAVENOUS at 08:12

## 2023-11-30 ASSESSMENT — PAIN DESCRIPTION - DESCRIPTORS
DESCRIPTORS: ACHING;DISCOMFORT
DESCRIPTORS: ACHING;DISCOMFORT
DESCRIPTORS: THROBBING
DESCRIPTORS: ACHING;DISCOMFORT
DESCRIPTORS: ACHING;SORE
DESCRIPTORS: ACHING;DISCOMFORT

## 2023-11-30 ASSESSMENT — PAIN SCALES - GENERAL
PAINLEVEL_OUTOF10: 0
PAINLEVEL_OUTOF10: 5
PAINLEVEL_OUTOF10: 4
PAINLEVEL_OUTOF10: 0
PAINLEVEL_OUTOF10: 0
PAINLEVEL_OUTOF10: 5
PAINLEVEL_OUTOF10: 8
PAINLEVEL_OUTOF10: 7
PAINLEVEL_OUTOF10: 4
PAINLEVEL_OUTOF10: 0
PAINLEVEL_OUTOF10: 3
PAINLEVEL_OUTOF10: 3

## 2023-11-30 ASSESSMENT — PAIN DESCRIPTION - FREQUENCY
FREQUENCY: CONTINUOUS

## 2023-11-30 ASSESSMENT — PAIN DESCRIPTION - DIRECTION
RADIATING_TOWARDS: FACE

## 2023-11-30 ASSESSMENT — PAIN DESCRIPTION - ORIENTATION
ORIENTATION: UPPER
ORIENTATION: RIGHT
ORIENTATION: UPPER
ORIENTATION: RIGHT

## 2023-11-30 ASSESSMENT — PAIN DESCRIPTION - PAIN TYPE
TYPE: SURGICAL PAIN

## 2023-11-30 ASSESSMENT — PAIN DESCRIPTION - LOCATION
LOCATION: HEAD

## 2023-11-30 ASSESSMENT — PAIN - FUNCTIONAL ASSESSMENT
PAIN_FUNCTIONAL_ASSESSMENT: ACTIVITIES ARE NOT PREVENTED
PAIN_FUNCTIONAL_ASSESSMENT: PREVENTS OR INTERFERES SOME ACTIVE ACTIVITIES AND ADLS
PAIN_FUNCTIONAL_ASSESSMENT: ACTIVITIES ARE NOT PREVENTED

## 2023-11-30 ASSESSMENT — PAIN DESCRIPTION - ONSET
ONSET: ON-GOING

## 2023-11-30 NOTE — PLAN OF CARE
Problem: Discharge Planning  Goal: Discharge to home or other facility with appropriate resources  Outcome: Progressing     Problem: Pain  Goal: Verbalizes/displays adequate comfort level or baseline comfort level  Outcome: Progressing     Problem: Neurosensory - Adult  Goal: Achieves stable or improved neurological status  Outcome: Progressing  Goal: Absence of seizures  Outcome: Progressing  Goal: Remains free of injury related to seizures activity  Outcome: Progressing  Goal: Achieves maximal functionality and self care  Outcome: Progressing

## 2023-11-30 NOTE — BRIEF OP NOTE
Brief Postoperative Note      Patient: Joao Carmona  YOB: 1970  MRN: [id number]    Date of Procedure: 11/30/2023    Pre-Op Diagnosis Codes:     * Brain mass [G93.89]    Post-Op Diagnosis: Same       Procedure(s):  RIGHT TEMPORAL CRANIOTOMY FOR RESECTION OF BRAIN MASS    Surgeon(s):  Ana Maria Coker MD    Assistant:  Surgical Assistant: Fox Olmos    Anesthesia: General    Estimated Blood Loss (mL): 027 mL    Complications: None    Specimens:   ID Type Source Tests Collected by Time Destination   A : Brain Tumor Tissue Tissue SURGICAL PATHOLOGY Ana Maria Coker MD 11/30/2023 3831    B : Brain Tumor Tissue Tissue SURGICAL PATHOLOGY Ana Maria Coker MD 11/30/2023 1142        Implants:  * No implants in log *      Drains:   Urinary Catheter 11/30/23 (Active)       Findings:   -right temporal craniotomy for tumor resection  -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

## 2023-11-30 NOTE — ANESTHESIA POSTPROCEDURE EVALUATION
Department of Anesthesiology  Postprocedure Note    Patient: Brianda Dwyer  MRN: [id number]  YOB: 1970  Date of evaluation: 11/30/2023      Procedure Summary       Date: 11/30/23 Room / Location: [address] [address] [facility]    Anesthesia Start: 0891 Anesthesia Stop: 7643    Procedure: RIGHT TEMPORAL CRANIOTOMY FOR RESECTION OF BRAIN MASS (Right: Head) Diagnosis:       Brain mass      (Brain mass [G93.89])    Surgeons: Meryle Cooter, MD Responsible Provider: Betty Iraheta MD    Anesthesia Type: general ASA Status: 3            Anesthesia Type: No value filed.     Saurav Phase I: Sauarv Score: 10    Saurav Phase II:        Anesthesia Post Evaluation    Patient location during evaluation: PACU  Patient participation: complete - patient participated  Level of consciousness: awake  Airway patency: patent  Nausea & Vomiting: no nausea and no vomiting  Complications: no  Cardiovascular status: blood pressure returned to baseline and hemodynamically stable  Respiratory status: acceptable  Hydration status: euvolemic  Multimodal analgesia pain management approach  Pain management: adequate

## 2023-11-30 NOTE — ANESTHESIA PRE PROCEDURE
\"PREGTESTUR\", \"PREGSERUM\", \"HCG\", \"HCGQUANT\"     ABGs: No results found for: \"PHART\", \"PO2ART\", \"LUV7NQA\", \"YGL5EEN\", \"BEART\", \"D9WWTQMW\"     Type & Screen (If Applicable):  No results found for: \"LABABO\", \"LABRH\"    Drug/Infectious Status (If Applicable):  No results found for: \"HIV\", \"HEPCAB\"    COVID-19 Screening (If Applicable): No results found for: \"COVID19\"        Anesthesia Evaluation    Airway: Mallampati: II  TM distance: >3 FB   Neck ROM: full  Mouth opening: > = 3 FB   Dental:          Pulmonary:                              Cardiovascular:    (+) hypertension:        Rhythm: regular  Rate: normal                    Neuro/Psych:               GI/Hepatic/Renal:             Endo/Other:                     Abdominal:             Vascular: Other Findings:         Anesthesia Plan      general     ASA 3       Induction: intravenous. Anesthetic plan and risks discussed with patient. Plan discussed with CRNA.                 Che Alonso MD   11/30/2023

## 2023-12-01 ENCOUNTER — APPOINTMENT (OUTPATIENT)
Dept: CT IMAGING | Age: 53
DRG: 025 | End: 2023-12-01
Attending: INTERNAL MEDICINE
Payer: COMMERCIAL

## 2023-12-01 ENCOUNTER — APPOINTMENT (OUTPATIENT)
Dept: MRI IMAGING | Age: 53
DRG: 025 | End: 2023-12-01
Attending: INTERNAL MEDICINE
Payer: COMMERCIAL

## 2023-12-01 PROBLEM — I62.9 INTRACRANIAL HEMORRHAGE (HCC): Status: RESOLVED | Noted: 2023-11-27 | Resolved: 2023-12-01

## 2023-12-01 PROBLEM — Z98.890 S/P CRANIOTOMY: Status: ACTIVE | Noted: 2023-12-01

## 2023-12-01 LAB
ALBUMIN SERPL-MCNC: 3.8 G/DL (ref 3.4–5)
ANION GAP SERPL CALCULATED.3IONS-SCNC: 13 MMOL/L (ref 3–16)
BUN SERPL-MCNC: 18 MG/DL (ref 7–20)
CALCIUM SERPL-MCNC: 9.1 MG/DL (ref 8.3–10.6)
CHLORIDE SERPL-SCNC: 106 MMOL/L (ref 99–110)
CO2 SERPL-SCNC: 22 MMOL/L (ref 21–32)
CREAT SERPL-MCNC: 1.3 MG/DL (ref 0.9–1.3)
DEPRECATED RDW RBC AUTO: 13 % (ref 12.4–15.4)
GFR SERPLBLD CREATININE-BSD FMLA CKD-EPI: >60 ML/MIN/{1.73_M2}
GLUCOSE SERPL-MCNC: 141 MG/DL (ref 70–99)
HCT VFR BLD AUTO: 39.2 % (ref 40.5–52.5)
HGB BLD-MCNC: 13.9 G/DL (ref 13.5–17.5)
MAGNESIUM SERPL-MCNC: 1.9 MG/DL (ref 1.8–2.4)
MCH RBC QN AUTO: 31.7 PG (ref 26–34)
MCHC RBC AUTO-ENTMCNC: 35.4 G/DL (ref 31–36)
MCV RBC AUTO: 89.6 FL (ref 80–100)
PHOSPHATE SERPL-MCNC: 3.4 MG/DL (ref 2.5–4.9)
PLATELET # BLD AUTO: 197 K/UL (ref 135–450)
PMV BLD AUTO: 7.6 FL (ref 5–10.5)
POTASSIUM SERPL-SCNC: 3.6 MMOL/L (ref 3.5–5.1)
RBC # BLD AUTO: 4.38 M/UL (ref 4.2–5.9)
SODIUM SERPL-SCNC: 141 MMOL/L (ref 136–145)
WBC # BLD AUTO: 14.4 K/UL (ref 4–11)

## 2023-12-01 PROCEDURE — 85027 COMPLETE CBC AUTOMATED: CPT

## 2023-12-01 PROCEDURE — 6360000002 HC RX W HCPCS: Performed by: STUDENT IN AN ORGANIZED HEALTH CARE EDUCATION/TRAINING PROGRAM

## 2023-12-01 PROCEDURE — 99024 POSTOP FOLLOW-UP VISIT: CPT | Performed by: NURSE PRACTITIONER

## 2023-12-01 PROCEDURE — 70450 CT HEAD/BRAIN W/O DYE: CPT

## 2023-12-01 PROCEDURE — APPNB30 APP NON BILLABLE TIME 0-30 MINS: Performed by: NURSE PRACTITIONER

## 2023-12-01 PROCEDURE — 97116 GAIT TRAINING THERAPY: CPT

## 2023-12-01 PROCEDURE — 92523 SPEECH SOUND LANG COMPREHEN: CPT

## 2023-12-01 PROCEDURE — 83735 ASSAY OF MAGNESIUM: CPT

## 2023-12-01 PROCEDURE — 36415 COLL VENOUS BLD VENIPUNCTURE: CPT

## 2023-12-01 PROCEDURE — 97535 SELF CARE MNGMENT TRAINING: CPT

## 2023-12-01 PROCEDURE — 1200000000 HC SEMI PRIVATE

## 2023-12-01 PROCEDURE — 94761 N-INVAS EAR/PLS OXIMETRY MLT: CPT

## 2023-12-01 PROCEDURE — APPNB45 APP NON BILLABLE 31-45 MINUTES

## 2023-12-01 PROCEDURE — 6370000000 HC RX 637 (ALT 250 FOR IP): Performed by: STUDENT IN AN ORGANIZED HEALTH CARE EDUCATION/TRAINING PROGRAM

## 2023-12-01 PROCEDURE — 80069 RENAL FUNCTION PANEL: CPT

## 2023-12-01 PROCEDURE — 97530 THERAPEUTIC ACTIVITIES: CPT

## 2023-12-01 PROCEDURE — 6360000004 HC RX CONTRAST MEDICATION: Performed by: STUDENT IN AN ORGANIZED HEALTH CARE EDUCATION/TRAINING PROGRAM

## 2023-12-01 PROCEDURE — 70553 MRI BRAIN STEM W/O & W/DYE: CPT

## 2023-12-01 PROCEDURE — A9576 INJ PROHANCE MULTIPACK: HCPCS | Performed by: STUDENT IN AN ORGANIZED HEALTH CARE EDUCATION/TRAINING PROGRAM

## 2023-12-01 PROCEDURE — 6360000002 HC RX W HCPCS: Performed by: NURSE PRACTITIONER

## 2023-12-01 PROCEDURE — 97166 OT EVAL MOD COMPLEX 45 MIN: CPT

## 2023-12-01 PROCEDURE — 6360000002 HC RX W HCPCS

## 2023-12-01 PROCEDURE — 6370000000 HC RX 637 (ALT 250 FOR IP): Performed by: NURSE PRACTITIONER

## 2023-12-01 PROCEDURE — 2580000003 HC RX 258: Performed by: STUDENT IN AN ORGANIZED HEALTH CARE EDUCATION/TRAINING PROGRAM

## 2023-12-01 PROCEDURE — 6370000000 HC RX 637 (ALT 250 FOR IP)

## 2023-12-01 PROCEDURE — 97162 PT EVAL MOD COMPLEX 30 MIN: CPT

## 2023-12-01 RX ORDER — LANOLIN ALCOHOL/MO/W.PET/CERES
3 CREAM (GRAM) TOPICAL NIGHTLY PRN
Status: DISCONTINUED | OUTPATIENT
Start: 2023-12-01 | End: 2023-12-03 | Stop reason: HOSPADM

## 2023-12-01 RX ORDER — LEVETIRACETAM 750 MG/1
1500 TABLET ORAL 2 TIMES DAILY
Status: DISCONTINUED | OUTPATIENT
Start: 2023-12-01 | End: 2023-12-03 | Stop reason: HOSPADM

## 2023-12-01 RX ORDER — METHOCARBAMOL 750 MG/1
750 TABLET, FILM COATED ORAL 4 TIMES DAILY
Status: DISCONTINUED | OUTPATIENT
Start: 2023-12-01 | End: 2023-12-03 | Stop reason: HOSPADM

## 2023-12-01 RX ORDER — POLYETHYLENE GLYCOL 3350 17 G/17G
17 POWDER, FOR SOLUTION ORAL DAILY PRN
Qty: 30 PACKET | Refills: 0 | Status: ON HOLD | COMMUNITY
Start: 2023-12-01 | End: 2023-12-31

## 2023-12-01 RX ORDER — ONDANSETRON 4 MG/1
4 TABLET, ORALLY DISINTEGRATING ORAL EVERY 12 HOURS PRN
Qty: 14 TABLET | Refills: 0 | Status: ON HOLD | OUTPATIENT
Start: 2023-12-01

## 2023-12-01 RX ORDER — DEXAMETHASONE 4 MG/1
4 TABLET ORAL EVERY 6 HOURS SCHEDULED
Status: COMPLETED | OUTPATIENT
Start: 2023-12-01 | End: 2023-12-03

## 2023-12-01 RX ORDER — SENNA AND DOCUSATE SODIUM 50; 8.6 MG/1; MG/1
2 TABLET, FILM COATED ORAL 2 TIMES DAILY
Status: DISCONTINUED | OUTPATIENT
Start: 2023-12-01 | End: 2023-12-03 | Stop reason: HOSPADM

## 2023-12-01 RX ORDER — METHOCARBAMOL 750 MG/1
750 TABLET, FILM COATED ORAL EVERY 6 HOURS PRN
Qty: 28 TABLET | Refills: 0 | Status: SHIPPED | OUTPATIENT
Start: 2023-12-01 | End: 2023-12-08

## 2023-12-01 RX ORDER — POLYETHYLENE GLYCOL 3350 17 G/17G
17 POWDER, FOR SOLUTION ORAL DAILY PRN
Status: DISCONTINUED | OUTPATIENT
Start: 2023-12-01 | End: 2023-12-03 | Stop reason: HOSPADM

## 2023-12-01 RX ORDER — OXYCODONE HYDROCHLORIDE 5 MG/1
5-10 TABLET ORAL EVERY 6 HOURS PRN
Qty: 42 TABLET | Refills: 0 | Status: SHIPPED | OUTPATIENT
Start: 2023-12-01 | End: 2023-12-08

## 2023-12-01 RX ORDER — LEVETIRACETAM 1000 MG/1
1000 TABLET ORAL 2 TIMES DAILY
Qty: 60 TABLET | Refills: 3 | Status: ON HOLD | OUTPATIENT
Start: 2023-12-01

## 2023-12-01 RX ORDER — PANTOPRAZOLE SODIUM 40 MG/1
40 TABLET, DELAYED RELEASE ORAL
Qty: 14 TABLET | Refills: 0 | Status: ON HOLD | OUTPATIENT
Start: 2023-12-02 | End: 2023-12-16

## 2023-12-01 RX ORDER — DEXAMETHASONE 4 MG/1
4 TABLET ORAL EVERY 8 HOURS SCHEDULED
Status: DISCONTINUED | OUTPATIENT
Start: 2023-12-03 | End: 2023-12-03 | Stop reason: HOSPADM

## 2023-12-01 RX ORDER — DEXAMETHASONE 4 MG/1
4 TABLET ORAL DAILY
Status: DISCONTINUED | OUTPATIENT
Start: 2023-12-08 | End: 2023-12-03 | Stop reason: HOSPADM

## 2023-12-01 RX ORDER — DEXAMETHASONE 4 MG/1
4 TABLET ORAL EVERY 12 HOURS SCHEDULED
Status: DISCONTINUED | OUTPATIENT
Start: 2023-12-05 | End: 2023-12-03 | Stop reason: HOSPADM

## 2023-12-01 RX ORDER — DEXAMETHASONE 4 MG/1
TABLET ORAL
Qty: 20 TABLET | Refills: 0 | Status: SHIPPED | OUTPATIENT
Start: 2023-12-01 | End: 2023-12-09

## 2023-12-01 RX ORDER — HEPARIN SODIUM 5000 [USP'U]/ML
5000 INJECTION, SOLUTION INTRAVENOUS; SUBCUTANEOUS EVERY 8 HOURS SCHEDULED
Status: DISCONTINUED | OUTPATIENT
Start: 2023-12-01 | End: 2023-12-03 | Stop reason: HOSPADM

## 2023-12-01 RX ORDER — LORAZEPAM 2 MG/ML
1 INJECTION INTRAMUSCULAR ONCE
Status: COMPLETED | OUTPATIENT
Start: 2023-12-01 | End: 2023-12-01

## 2023-12-01 RX ORDER — SENNA AND DOCUSATE SODIUM 50; 8.6 MG/1; MG/1
2 TABLET, FILM COATED ORAL 2 TIMES DAILY PRN
Status: ON HOLD | COMMUNITY
Start: 2023-12-01

## 2023-12-01 RX ADMIN — METHOCARBAMOL TABLETS 750 MG: 750 TABLET, COATED ORAL at 01:11

## 2023-12-01 RX ADMIN — ACETAMINOPHEN 1000 MG: 500 TABLET ORAL at 04:07

## 2023-12-01 RX ADMIN — LEVETIRACETAM 1000 MG: 500 TABLET, FILM COATED ORAL at 09:57

## 2023-12-01 RX ADMIN — SODIUM CHLORIDE, PRESERVATIVE FREE 10 ML: 5 INJECTION INTRAVENOUS at 10:00

## 2023-12-01 RX ADMIN — METHOCARBAMOL TABLETS 750 MG: 750 TABLET, COATED ORAL at 18:24

## 2023-12-01 RX ADMIN — HEPARIN SODIUM 5000 UNITS: 5000 INJECTION INTRAVENOUS; SUBCUTANEOUS at 06:52

## 2023-12-01 RX ADMIN — DOCUSATE SODIUM 50 MG AND SENNOSIDES 8.6 MG 2 TABLET: 8.6; 5 TABLET, FILM COATED ORAL at 20:16

## 2023-12-01 RX ADMIN — OXYCODONE 10 MG: 5 TABLET ORAL at 02:09

## 2023-12-01 RX ADMIN — METHOCARBAMOL TABLETS 750 MG: 750 TABLET, COATED ORAL at 12:04

## 2023-12-01 RX ADMIN — DEXAMETHASONE SODIUM PHOSPHATE 4 MG: 4 INJECTION, SOLUTION INTRAMUSCULAR; INTRAVENOUS at 09:57

## 2023-12-01 RX ADMIN — SODIUM CHLORIDE, PRESERVATIVE FREE 10 ML: 5 INJECTION INTRAVENOUS at 21:21

## 2023-12-01 RX ADMIN — DEXAMETHASONE SODIUM PHOSPHATE 4 MG: 4 INJECTION, SOLUTION INTRAMUSCULAR; INTRAVENOUS at 04:07

## 2023-12-01 RX ADMIN — DEXAMETHASONE 4 MG: 4 TABLET ORAL at 18:24

## 2023-12-01 RX ADMIN — OXYCODONE 10 MG: 5 TABLET ORAL at 20:16

## 2023-12-01 RX ADMIN — HEPARIN SODIUM 5000 UNITS: 5000 INJECTION INTRAVENOUS; SUBCUTANEOUS at 20:17

## 2023-12-01 RX ADMIN — Medication 3 MG: at 21:21

## 2023-12-01 RX ADMIN — PANTOPRAZOLE SODIUM 40 MG: 40 TABLET, DELAYED RELEASE ORAL at 05:14

## 2023-12-01 RX ADMIN — METHOCARBAMOL TABLETS 750 MG: 750 TABLET, COATED ORAL at 20:16

## 2023-12-01 RX ADMIN — HEPARIN SODIUM 5000 UNITS: 5000 INJECTION INTRAVENOUS; SUBCUTANEOUS at 14:29

## 2023-12-01 RX ADMIN — ACETAMINOPHEN 1000 MG: 500 TABLET ORAL at 12:04

## 2023-12-01 RX ADMIN — LEVETIRACETAM 1500 MG: 750 TABLET, FILM COATED ORAL at 20:22

## 2023-12-01 RX ADMIN — HYDROMORPHONE HYDROCHLORIDE 0.5 MG: 1 INJECTION, SOLUTION INTRAMUSCULAR; INTRAVENOUS; SUBCUTANEOUS at 00:16

## 2023-12-01 RX ADMIN — LORAZEPAM 1 MG: 2 INJECTION INTRAMUSCULAR; INTRAVENOUS at 20:22

## 2023-12-01 RX ADMIN — Medication 100 MG: at 09:57

## 2023-12-01 RX ADMIN — OXYCODONE 10 MG: 5 TABLET ORAL at 08:53

## 2023-12-01 RX ADMIN — SODIUM CHLORIDE 2000 MG: 900 INJECTION INTRAVENOUS at 04:11

## 2023-12-01 RX ADMIN — ACETAMINOPHEN 1000 MG: 500 TABLET ORAL at 18:24

## 2023-12-01 RX ADMIN — DEXAMETHASONE 4 MG: 4 TABLET ORAL at 12:08

## 2023-12-01 RX ADMIN — SODIUM CHLORIDE 2000 MG: 900 INJECTION INTRAVENOUS at 12:03

## 2023-12-01 RX ADMIN — METHOCARBAMOL TABLETS 750 MG: 750 TABLET, COATED ORAL at 08:51

## 2023-12-01 RX ADMIN — ONDANSETRON 4 MG: 4 TABLET, ORALLY DISINTEGRATING ORAL at 12:55

## 2023-12-01 RX ADMIN — GADOTERIDOL 15 ML: 279.3 INJECTION, SOLUTION INTRAVENOUS at 22:45

## 2023-12-01 RX ADMIN — NIFEDIPINE 30 MG: 30 TABLET, EXTENDED RELEASE ORAL at 09:57

## 2023-12-01 ASSESSMENT — PAIN DESCRIPTION - ORIENTATION
ORIENTATION: RIGHT
ORIENTATION: UPPER
ORIENTATION: RIGHT;LEFT
ORIENTATION: RIGHT
ORIENTATION: UPPER
ORIENTATION: RIGHT

## 2023-12-01 ASSESSMENT — PAIN - FUNCTIONAL ASSESSMENT
PAIN_FUNCTIONAL_ASSESSMENT: ACTIVITIES ARE NOT PREVENTED

## 2023-12-01 ASSESSMENT — PAIN DESCRIPTION - DESCRIPTORS
DESCRIPTORS: ACHING;DISCOMFORT
DESCRIPTORS: ACHING
DESCRIPTORS: THROBBING
DESCRIPTORS: ACHING;DISCOMFORT
DESCRIPTORS: THROBBING

## 2023-12-01 ASSESSMENT — PAIN DESCRIPTION - ONSET
ONSET: ON-GOING

## 2023-12-01 ASSESSMENT — PAIN DESCRIPTION - PAIN TYPE
TYPE: SURGICAL PAIN

## 2023-12-01 ASSESSMENT — PAIN DESCRIPTION - LOCATION
LOCATION: HEAD
LOCATION: JAW
LOCATION: HEAD

## 2023-12-01 ASSESSMENT — PAIN SCALES - GENERAL
PAINLEVEL_OUTOF10: 10
PAINLEVEL_OUTOF10: 10
PAINLEVEL_OUTOF10: 4
PAINLEVEL_OUTOF10: 9
PAINLEVEL_OUTOF10: 8
PAINLEVEL_OUTOF10: 4
PAINLEVEL_OUTOF10: 6
PAINLEVEL_OUTOF10: 8
PAINLEVEL_OUTOF10: 8
PAINLEVEL_OUTOF10: 7
PAINLEVEL_OUTOF10: 6
PAINLEVEL_OUTOF10: 10
PAINLEVEL_OUTOF10: 10

## 2023-12-01 ASSESSMENT — PAIN DESCRIPTION - FREQUENCY
FREQUENCY: CONTINUOUS

## 2023-12-01 ASSESSMENT — PAIN DESCRIPTION - DIRECTION: RADIATING_TOWARDS: FACE

## 2023-12-01 NOTE — PLAN OF CARE
Problem: SLP Adult - Impaired Swallowing  Goal: By Discharge: Advance to least restrictive diet without signs or symptoms of aspiration for planned discharge setting. See evaluation for individualized goals. Outcome: Adequate for Discharge     Problem: SLP Adult - Disturbed Thought Process  Goal: By Discharge: Demonstrates cognitive skills at highest level of function for planned discharge setting. See evaluation for individualized goals.   Outcome: Progressing

## 2023-12-01 NOTE — OP NOTE
Operative Note      Patient: Jaya Sal  YOB: 1970  MRN: [id number]    Date of Procedure: 11/30/2023    Pre-Op Diagnosis Codes:     * Brain mass [G93.89]    Post-Op Diagnosis: high grade glioma       Procedure(s):  RIGHT TEMPORAL CRANIOTOMY FOR RESECTION OF BRAIN MASS    Surgeon(s):  Poncho Almendarez MD    Assistant:   Surgical Assistant: Shanon Calix    Anesthesia: General    Estimated Blood Loss (mL): 421 mL    Complications: None    Specimens:   ID Type Source Tests Collected by Time Destination   A : Brain Tumor Tissue Tissue SURGICAL PATHOLOGY Poncho Almendarez MD 11/30/2023 9760    B : Brain Tumor Tissue Tissue SURGICAL PATHOLOGY Poncho Almendarez MD 11/30/2023 1142        Implants:  Implant Name Type Inv. Item Serial No.  Lot No. LRB No. Used Action   COVER BUR H [id number] CRANIOMAXILLOFACIAL PLT LO PROF W/ TAB - [id number]  COVER BUR H [id number] CRANIOMAXILLOFACIAL PLT LO PROF W/ TAB  JERRY CRANIOMAXILLOFACIAL-WD  Right 3 Implanted   SCREW UN3 SLFTP 1.5X4MM - [id number] Screw/Plate/Nail/Beto SCREW UN3 SLFTP 1.5X4MM  JERRY: ORTHOPAEDICS-PMM  Right 9 Implanted         Drains:   [REMOVED] Urinary Catheter 11/30/23 (Removed)   Catheter Indications Need for fluid volume management of the critically ill patient in a critical care setting 11/30/23 1600   Site Assessment No urethral drainage 11/30/23 1600   Urine Color Yellow 11/30/23 1600   Urine Appearance Clear 11/30/23 1600   Collection Container Standard 11/30/23 1600   Securement Method Securing device (Describe) 11/30/23 1600   Catheter Care  Soap and water 11/30/23 1600   Catheter Best Practices  Drainage tube clipped to bed;Catheter secured to thigh; Tamper seal intact; Bag below bladder;Bag not on floor; Lack of dependent loop in tubing;Drainage bag less than half full 11/30/23 1600   Status Draining 11/30/23 1600   Output (mL) 450 mL 11/30/23 1600       Date of Procedure: 11/30/23    Pre-op Diagnosis: brain

## 2023-12-01 NOTE — PROCEDURES
INTERPRETATION:  This more than 1-hour, rapid 8-channel EEG recording is abnormal due to the followings. Continuous lateralized periodic discharges (LPDs) over the right temporal head regions. Moderate degree of generalized slowing background activity, maximal right. The EEG findings were consistent with high risk of non-convulsive status epilepticus from right temporal head region and moderate degree of generalized non-specific cerebral dysfunction, maximal right temporal.    Please consider standard EEG monitoring for further evaluation. CLASSIFICATION:  Dysrhythmia grade 3, right LPDs. DESCRIPTION:  BACKGROUND:  The EEG background showed continuous generalized low amplitude intermixed 2 to 7 Hz theta/delta activity. The EEG showed fair variability and reactivity. INTERICTAL DISCHARGES: Continuous lateralized periodic discharges over the right temporal head regions, maximal at T4. The pattern was at frequency 1 - 3 Hz.     SEIZURE BURDEN: up to 100%

## 2023-12-01 NOTE — CARE COORDINATION
Case management is following for discharge planning. The chart was reviewed. Mr. Victor Manuel Shultz remains in the ICU. He is POD #1 crani with mass resection. Oncology and rad oncology are consulted. CM will reassess for disposition and service needs once he is more medically stable.     Huber Arrieta RN  Case Management  [phone]

## 2023-12-01 NOTE — PLAN OF CARE
Problem: Discharge Planning  Goal: Discharge to home or other facility with appropriate resources  Recent Flowsheet Documentation  Taken 12/1/2023 0800 by Zahira Mccoy RN  Discharge to home or other facility with appropriate resources: Identify barriers to discharge with patient and caregiver     Problem: Pain  Goal: Verbalizes/displays adequate comfort level or baseline comfort level  Recent Flowsheet Documentation  Taken 12/1/2023 0800 by Zahira Mccoy RN  Verbalizes/displays adequate comfort level or baseline comfort level: Encourage patient to monitor pain and request assistance     Problem: Neurosensory - Adult  Goal: Achieves stable or improved neurological status  Recent Flowsheet Documentation  Taken 12/1/2023 0800 by Zahira Mccoy RN  Achieves stable or improved neurological status: Assess for and report changes in neurological status  Goal: Absence of seizures  Recent Flowsheet Documentation  Taken 12/1/2023 0800 by Zahira Mccoy RN  Absence of seizures: Monitor for seizure activity.   If seizure occurs, document type and location of movements and any associated apnea  Goal: Remains free of injury related to seizures activity  Recent Flowsheet Documentation  Taken 12/1/2023 0800 by Zahira Mccoy RN  Remains free of injury related to seizure activity: Maintain airway, patient safety  and administer oxygen as ordered  Goal: Achieves maximal functionality and self care  Recent Flowsheet Documentation  Taken 12/1/2023 0800 by Zahira Mccoy RN  Achieves maximal functionality and self care: Monitor swallowing and airway patency with patient fatigue and changes in neurological status     Problem: Safety - Adult  Goal: Free from fall injury  Recent Flowsheet Documentation  Taken 12/1/2023 0800 by Zahira Mccoy RN  Free From Fall Injury: Instruct family/caregiver on patient safety     Problem: ABCDS Injury Assessment  Goal: Absence of physical injury  Recent Flowsheet Documentation  Taken

## 2023-12-01 NOTE — CONSULTS
ICU HISTORY AND PHYSICAL       Hospital Day: 1  ICU Day:  1                                                        Code:Full Code  Admit Date: 11/27/2023  PCP: Salvador HERNANDEZ                                  CC: L sided weakness    HISTORY OF PRESENT ILLNESS:   Mr. Francia Augustin is a 47 y.o. 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 Pt stated that he began having \"twitching\" of the L side of his face around 11:00AM today. Additionally has some head jerking and turns head to left. Last seizure was several weeks ago. Pt is on Keppra 750 BID and is compliant with medications. At presentation, VSS and patient was afebrile. Lab analysis was laregely unremarkable but with utox positive for cannabinoids. CTA head and neck needed to be repeated but family reported iodine allergy causing swelling. CT head w/o contrast obtained on arrival that showed questionable mass in R temporal lobe with possible tiny amount of associated hemorrhage. No midline shift. Thrombolytic therapy contraindicated. While in the ED, pt had bradycardia down to 39. Pt was given 1g Keppra IV and Ativan 1 mg IV. Bedside EEG was positive for seizure activity. Pt was transferred to [facility] ICU for closer neurological management. Per chart review: Patient was admitted to the hospital on September 8, 2023 and discharged on September 14, 2023 after presenting with a syncopal episode and possible seizure. MRI showed enhancement within the area of right cerebral hemisphere consistent with acute encephalitis. He underwent extensive workup and was treated with acyclovir. He was started on Keppra. Later admitted to the hospital on October 29, 2023 for breakthrough seizure, but ended up leaving AMA. He was recommended to have MRI brain but left prior to being it completed.  Followed up with PCP on Nov 3rd
[facility]    Consult Note      Requesting Physician:  Chilo Mccullough    CHIEF COMPLAINT:  L sided weakness      HISTORY OF PRESENT ILLNESS:    Mr. Alexandro Lua  is a 47 y.o. male we are seeing in consultation for brain mass, post resection. He is a 47 y.o. 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 CT head w/o contrast obtained on arrival that showed questionable mass in R temporal lobe with possible tiny amount of associated hemorrhage. No midline shift. Bedside EEG was positive for seizure activity. He was transferred to [facility] for further management. CT CAP with no acute abnormality of the chest, abdomen or pelvis    He is s/p R temporal craniotomy 11/30/23. 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 Today he is agitated and wants to be discharged.      Past Medical History:  Past Medical History:   Diagnosis Date    Hypertension     Seizure [facility])        Past Surgical History:  Past Surgical History:   Procedure Laterality Date    CRANIOTOMY Right 11/30/2023    RIGHT TEMPORAL CRANIOTOMY FOR RESECTION OF BRAIN MASS performed by Jose Osei MD at [facility] OR       Current Medications:  Current Facility-Administered Medications   Medication Dose Route Frequency Provider Last Rate Last Admin    methocarbamol (ROBAXIN) tablet 750 mg  750 mg Oral 4x Daily Adele Mera APRN - CNP   750 mg at 12/01/23 1204    sennosides-docusate sodium (SENOKOT-S) 8.6-50 MG tablet 2 tablet  2 tablet Oral BID Suki Mccullough APRN - CNP        polyethylene glycol (GLYCOLAX) packet 17 g  17 g Oral Daily PRN Suki Mccullough APRN - CNP        heparin (porcine) injection 5,000 Units  5,000 Units SubCUTAneous 3 times per day Carmine Lazcano
severely poor arterial opacification. Essentially nondiagnostic evaluation of the extracranial vasculature. 2. No gross evidence of a proximal large vessel occlusion involving the [id number]. CT HEAD WO CONTRAST  Result Date: 11/27/2023  1. Persistent ovoid hyperattenuation in the right temporal lobe which may reflect intracranial hemorrhage with mild surrounding edema. No new hemorrhage or mass effect. 2.  Mild asymmetric crowding of the cortical sulci of the right cerebrum. MRI of the brain can be obtained for further evaluation as clinically indicated. MRI BRAIN W WO CONTRAST  Result Date: 11/28/2023  Multiple new ring-enhancing lesions and smaller focal amorphous enhancement in the right temporal lobe. The largest lesion has internal hemorrhagic change. This is in the setting of pre-existing abnormal slightly masslike FLAIR signal abnormality throughout the right cerebral hemisphere. Findings are indeterminant but could represent underlying low-grade glioma with new multifocal areas of glioblastoma. Other lesions to include metastasis are possible but considered less likely. Signal characteristics would be atypical for abscess. Neurosurgical consultation recommended. MRA HEAD WO CONTRAST  Result Date: 11/28/2023  Normal MRA of the head. MRA NECK W WO CONTRAST  Result Date: 11/28/2023  Normal MRA of the neck.      Labs:  Recent Labs     11/28/23  0637   WBC 8.3   HGB 16.5   HCT 47.0          Recent Labs     11/28/23  0637      K 4.1      CO2 20*   BUN 10   CREATININE 1.1   GLUCOSE 112*   CALCIUM 9.9   PHOS 2.9   MG 2.10       Recent Labs     11/27/23  1235   PROTIME 13.5   INR 1.03       Patient Active Problem List    Diagnosis Date Noted    Brain mass 11/28/2023    Nonintractable generalized idiopathic epilepsy without status epilepticus ([address]) 11/28/2023    Intracranial hemorrhage ([address]) 11/27/2023    Seizures ([address]) 10/29/2023    Seizure ([address]) 09/08/2023    Encephalitis
found.                       IMPRESSION & RECOMMENDATIONS     IMPRESSION:  47 y.o. y/o male with history significant for HTN, suspected HSV encephalitis (9/2023) and recent dx of seizure (9/2023) who presents to [facility] as a transfer from [facility] with chief complaint of left sided weakness and numbness. He was found to have hyperattenuated ovoid lesion concerning for possible hemorrhage with surrounding edema. Suspect that patient likely had typical seizure in setting of right temporal lobe lesions. 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 However, hemorrhagic stroke cannot be fully excluded either. ICH SCORE:    Component SCORE   GCS 0 - GCS 13-15   ICH Volume 0 - <30ml   Intraventricular Hemorrhage 0 - NO   Infratentorial Origin 0 - NO   Age 0 - < 79 y/o   TOTALS POINTS: 0       RECOMMENDATIONS:   NEURO:  Neurologic Exams Q1H  NIHSS on admission & Qshift    DIAGNOSTIC IMAGING  Head CT - f/u scan in 6 hours  MRI of brain w/ & w/o when able  Pending findings, will consider consultation to neurosurgery. ICU MANAGEMENT  Airway Management  Supplemental O2 to maintain SaO2 > 95%  Aspiration Precautions (HOB elevated, Up for meals, mouth care)  Hemodynamic management  SBP ? 160 mmHg (<180 if presenting BP was >220)  Goal range 140-160  Call for SBP <130  IV Intermittent dose: Labetalol 10 mg  Hold full dose anticoagulation and antiplatelet medications for 2 weeks. Seizure prophylaxis: Continue Keppra 1g BID  DVT Prophylaxis  SCDs bilateral Lower Extremities   24 hours after stable head CT may start SQH TID   General Care Issues  Glucose: Goal glucose 110-180 mg/dL. Sodium:  Maintain in normal range (135 - 146 Benjy/L)  Magnesium: Maintain > 1.8 mg/dL. Heme: Keep Plts ? 100K, Keep INR ? 1.4  Temperature: Goal is normothermia.   Culture for fever > 101.5 F  Nutrition: Place NG if unable to pass swallow evaluation   PT/OT/SLP & PMR consult as indicated      Management and plan discussed
improving progression free survival as well as overall survival.  After this treatment, a dose dense regimen of temozolomide would be recommended for at least 6 months to 1 year. I explained that she could also be treated with Tumor Treating Fields during this time. I have recommended a total dose 4600 cGy and 23 fractions delivered to postoperative regions demonstrate T2/FLAIR hyperintensity, followed by a 1400 cGy in 7 fractions sequential boost to T1c enhancing disease. Radiation would be given concurrently with oral temozolomide. The patient lives in [place], and would like to receive treatment closer to home. We will therefore plan on delivering chemoradiation at [facility]. I will plan to obtain an MRI brain in about 4 weeks as well as a CT simulation for radiation planning around the same time and will initiate adjuvant radiation shortly thereafter. Thank you for asking me to see the patient.        Diogenes Stone MD  Please Contact Through [employer]

## 2023-12-01 NOTE — PLAN OF CARE
Neurocritical Care Plan of Care:    Case discussed with MIN Powell. Chart reviewed, neurologic exam remains stable. Downgraded to Q4 hour neuro checks. On gleolan precautions for another 24 hours. Neurocritical care will be available while patient in ICU to assist with management as needed.     MIN [facility]  Neurology & Neurocritical Care   Neurology Line: [phone]  [facility]: [facility] NPs

## 2023-12-02 LAB
ALBUMIN SERPL-MCNC: 3.7 G/DL (ref 3.4–5)
ANION GAP SERPL CALCULATED.3IONS-SCNC: 8 MMOL/L (ref 3–16)
BUN SERPL-MCNC: 13 MG/DL (ref 7–20)
CALCIUM SERPL-MCNC: 9.5 MG/DL (ref 8.3–10.6)
CHLORIDE SERPL-SCNC: 103 MMOL/L (ref 99–110)
CO2 SERPL-SCNC: 26 MMOL/L (ref 21–32)
CREAT SERPL-MCNC: 1.1 MG/DL (ref 0.9–1.3)
DEPRECATED RDW RBC AUTO: 13.2 % (ref 12.4–15.4)
GFR SERPLBLD CREATININE-BSD FMLA CKD-EPI: >60 ML/MIN/{1.73_M2}
GLUCOSE SERPL-MCNC: 129 MG/DL (ref 70–99)
HCT VFR BLD AUTO: 39.9 % (ref 40.5–52.5)
HGB BLD-MCNC: 14 G/DL (ref 13.5–17.5)
MAGNESIUM SERPL-MCNC: 2.1 MG/DL (ref 1.8–2.4)
MCH RBC QN AUTO: 31.1 PG (ref 26–34)
MCHC RBC AUTO-ENTMCNC: 35.2 G/DL (ref 31–36)
MCV RBC AUTO: 88.3 FL (ref 80–100)
PHOSPHATE SERPL-MCNC: 2.6 MG/DL (ref 2.5–4.9)
PLATELET # BLD AUTO: 195 K/UL (ref 135–450)
PMV BLD AUTO: 7.6 FL (ref 5–10.5)
POTASSIUM SERPL-SCNC: 4 MMOL/L (ref 3.5–5.1)
RBC # BLD AUTO: 4.51 M/UL (ref 4.2–5.9)
SODIUM SERPL-SCNC: 137 MMOL/L (ref 136–145)
WBC # BLD AUTO: 13.7 K/UL (ref 4–11)

## 2023-12-02 PROCEDURE — 80069 RENAL FUNCTION PANEL: CPT

## 2023-12-02 PROCEDURE — 85027 COMPLETE CBC AUTOMATED: CPT

## 2023-12-02 PROCEDURE — 2580000003 HC RX 258: Performed by: STUDENT IN AN ORGANIZED HEALTH CARE EDUCATION/TRAINING PROGRAM

## 2023-12-02 PROCEDURE — 6370000000 HC RX 637 (ALT 250 FOR IP): Performed by: STUDENT IN AN ORGANIZED HEALTH CARE EDUCATION/TRAINING PROGRAM

## 2023-12-02 PROCEDURE — APPNB30 APP NON BILLABLE TIME 0-30 MINS: Performed by: NURSE PRACTITIONER

## 2023-12-02 PROCEDURE — 6360000002 HC RX W HCPCS: Performed by: STUDENT IN AN ORGANIZED HEALTH CARE EDUCATION/TRAINING PROGRAM

## 2023-12-02 PROCEDURE — 99024 POSTOP FOLLOW-UP VISIT: CPT | Performed by: NURSE PRACTITIONER

## 2023-12-02 PROCEDURE — 6370000000 HC RX 637 (ALT 250 FOR IP)

## 2023-12-02 PROCEDURE — 6360000002 HC RX W HCPCS: Performed by: NURSE PRACTITIONER

## 2023-12-02 PROCEDURE — 6370000000 HC RX 637 (ALT 250 FOR IP): Performed by: NURSE PRACTITIONER

## 2023-12-02 PROCEDURE — 83735 ASSAY OF MAGNESIUM: CPT

## 2023-12-02 PROCEDURE — 1200000000 HC SEMI PRIVATE

## 2023-12-02 PROCEDURE — 36415 COLL VENOUS BLD VENIPUNCTURE: CPT

## 2023-12-02 RX ADMIN — LEVETIRACETAM 1500 MG: 750 TABLET, FILM COATED ORAL at 21:49

## 2023-12-02 RX ADMIN — NIFEDIPINE 30 MG: 30 TABLET, EXTENDED RELEASE ORAL at 08:20

## 2023-12-02 RX ADMIN — HEPARIN SODIUM 5000 UNITS: 5000 INJECTION INTRAVENOUS; SUBCUTANEOUS at 06:15

## 2023-12-02 RX ADMIN — ACETAMINOPHEN 1000 MG: 500 TABLET ORAL at 17:39

## 2023-12-02 RX ADMIN — ACETAMINOPHEN 1000 MG: 500 TABLET ORAL at 00:19

## 2023-12-02 RX ADMIN — DEXAMETHASONE 4 MG: 4 TABLET ORAL at 12:37

## 2023-12-02 RX ADMIN — Medication 100 MG: at 08:20

## 2023-12-02 RX ADMIN — HEPARIN SODIUM 5000 UNITS: 5000 INJECTION INTRAVENOUS; SUBCUTANEOUS at 16:03

## 2023-12-02 RX ADMIN — METHOCARBAMOL TABLETS 750 MG: 750 TABLET, COATED ORAL at 17:39

## 2023-12-02 RX ADMIN — Medication 3 MG: at 21:49

## 2023-12-02 RX ADMIN — DEXAMETHASONE 4 MG: 4 TABLET ORAL at 06:16

## 2023-12-02 RX ADMIN — HEPARIN SODIUM 5000 UNITS: 5000 INJECTION INTRAVENOUS; SUBCUTANEOUS at 21:55

## 2023-12-02 RX ADMIN — DEXAMETHASONE 4 MG: 4 TABLET ORAL at 00:19

## 2023-12-02 RX ADMIN — LEVETIRACETAM 1500 MG: 750 TABLET, FILM COATED ORAL at 08:19

## 2023-12-02 RX ADMIN — METHOCARBAMOL TABLETS 750 MG: 750 TABLET, COATED ORAL at 21:49

## 2023-12-02 RX ADMIN — HYDRALAZINE HYDROCHLORIDE 10 MG: 20 INJECTION, SOLUTION INTRAMUSCULAR; INTRAVENOUS at 07:25

## 2023-12-02 RX ADMIN — METHOCARBAMOL TABLETS 750 MG: 750 TABLET, COATED ORAL at 08:20

## 2023-12-02 RX ADMIN — SODIUM CHLORIDE, PRESERVATIVE FREE 10 ML: 5 INJECTION INTRAVENOUS at 08:22

## 2023-12-02 RX ADMIN — OXYCODONE 10 MG: 5 TABLET ORAL at 00:18

## 2023-12-02 RX ADMIN — PANTOPRAZOLE SODIUM 40 MG: 40 TABLET, DELAYED RELEASE ORAL at 06:16

## 2023-12-02 RX ADMIN — DEXAMETHASONE 4 MG: 4 TABLET ORAL at 18:26

## 2023-12-02 RX ADMIN — OXYCODONE 10 MG: 5 TABLET ORAL at 19:10

## 2023-12-02 RX ADMIN — ACETAMINOPHEN 1000 MG: 500 TABLET ORAL at 06:15

## 2023-12-02 RX ADMIN — SODIUM CHLORIDE, PRESERVATIVE FREE 10 ML: 5 INJECTION INTRAVENOUS at 21:55

## 2023-12-02 ASSESSMENT — PAIN - FUNCTIONAL ASSESSMENT
PAIN_FUNCTIONAL_ASSESSMENT: ACTIVITIES ARE NOT PREVENTED

## 2023-12-02 ASSESSMENT — PAIN DESCRIPTION - ORIENTATION
ORIENTATION: RIGHT

## 2023-12-02 ASSESSMENT — PAIN DESCRIPTION - LOCATION
LOCATION: HEAD
LOCATION: HEAD

## 2023-12-02 ASSESSMENT — PAIN SCALES - GENERAL
PAINLEVEL_OUTOF10: 0
PAINLEVEL_OUTOF10: 10
PAINLEVEL_OUTOF10: 0
PAINLEVEL_OUTOF10: 0
PAINLEVEL_OUTOF10: 10
PAINLEVEL_OUTOF10: 10
PAINLEVEL_OUTOF10: 8
PAINLEVEL_OUTOF10: 8
PAINLEVEL_OUTOF10: 10
PAINLEVEL_OUTOF10: 8

## 2023-12-02 ASSESSMENT — PAIN DESCRIPTION - DESCRIPTORS
DESCRIPTORS: ACHING

## 2023-12-02 NOTE — PLAN OF CARE
Called to bedside by RN. Patient displayed seizure like activity consisting of L face and L eye twitiching for ~1min. Episode was self limiting, and patient quickly returned to baseline. Keppra dose increased to 1500mg BID. Patient sent for stat CT head showing increase in R scalp hematoma over crani site, and mild expansion of R temporal lobe resection cavity with mildly increased mass effect and mild increase in R to L shift.      Continue decadron taper, q1h neuro checks    MIN Oliva - CNP   Neurology & Neurocritical Care   12/1/2023 11:28 PM    ICU Patients:   Neurocritical Care Line: [phone]  [facility]: [facility]

## 2023-12-02 NOTE — PLAN OF CARE
Care plan reviewed   Problem: Discharge Planning  Goal: Discharge to home or other facility with appropriate resources  Outcome: Progressing  Flowsheets (Taken 12/2/2023 0800)  Discharge to home or other facility with appropriate resources:   Identify barriers to discharge with patient and caregiver   Arrange for needed discharge resources and transportation as appropriate   Identify discharge learning needs (meds, wound care, etc)     Problem: Neurosensory - Adult  Goal: Achieves stable or improved neurological status  Outcome: Progressing  Flowsheets (Taken 12/2/2023 0800)  Achieves stable or improved neurological status:   Assess for and report changes in neurological status   Initiate measures to prevent increased intracranial pressure   Maintain blood pressure and fluid volume within ordered parameters to optimize cerebral perfusion and minimize risk of hemorrhage   Monitor temperature, glucose, and sodium. Initiate appropriate interventions as ordered  Goal: Absence of seizures  Outcome: Progressing  Flowsheets (Taken 12/2/2023 0800)  Absence of seizures:   Monitor for seizure activity.   If seizure occurs, document type and location of movements and any associated apnea   If seizure occurs, turn head to side and suction secretions as needed   Administer anticonvulsants as ordered   Diagnostic studies as ordered  Goal: Remains free of injury related to seizures activity  Outcome: Progressing  Flowsheets (Taken 12/2/2023 0800)  Remains free of injury related to seizure activity:   Maintain airway, patient safety  and administer oxygen as ordered   Monitor patient for seizure activity, document and report duration and description of seizure to Licensed Independent Practitioner   If seizure occurs, turn patient to side and suction secretions as needed  Goal: Achieves maximal functionality and self care  Outcome: Progressing  Flowsheets (Taken 12/2/2023 0800)  Achieves maximal functionality and self care:   Monitor

## 2023-12-02 NOTE — PLAN OF CARE
Problem: Neurosensory - Adult  Goal: Achieves stable or improved neurological status  Outcome: Adequate for Discharge  Goal: Absence of seizures  Outcome: Progressing  Goal: Remains free of injury related to seizures activity  Outcome: Progressing  Goal: Achieves maximal functionality and self care  Outcome: Adequate for Discharge     Problem: Pain  Goal: Verbalizes/displays adequate comfort level or baseline comfort level  Outcome: Progressing  Flowsheets  Taken 12/1/2023 2000 by Baudilio Saldaña RN  Verbalizes/displays adequate comfort level or baseline comfort level:   Encourage patient to monitor pain and request assistance   Assess pain using appropriate pain scale   Administer analgesics based on type and severity of pain and evaluate response   Implement non-pharmacological measures as appropriate and evaluate response    Problem: Safety - Adult  Goal: Free from fall injury  Outcome: Adequate for Discharge  Flowsheets (Taken 12/1/2023 2000)  Free From Fall Injury: Instruct family/caregiver on patient safety     Problem: Genitourinary - Adult  Goal: Absence of urinary retention  Outcome: Adequate for Discharge     Problem: Discharge Planning  Goal: Discharge to home or other facility with appropriate resources  Outcome: Progressing  Flowsheets (Taken 12/1/2023 2000)  Discharge to home or other facility with appropriate resources: Identify barriers to discharge with patient and caregiver

## 2023-12-03 VITALS
OXYGEN SATURATION: 94 % | HEIGHT: 71 IN | RESPIRATION RATE: 11 BRPM | HEART RATE: 54 BPM | BODY MASS INDEX: 21.45 KG/M2 | WEIGHT: 153.22 LBS | SYSTOLIC BLOOD PRESSURE: 157 MMHG | DIASTOLIC BLOOD PRESSURE: 95 MMHG | TEMPERATURE: 97.2 F

## 2023-12-03 LAB
ALBUMIN SERPL-MCNC: 3.3 G/DL (ref 3.4–5)
ANION GAP SERPL CALCULATED.3IONS-SCNC: 10 MMOL/L (ref 3–16)
BUN SERPL-MCNC: 19 MG/DL (ref 7–20)
CALCIUM SERPL-MCNC: 9.2 MG/DL (ref 8.3–10.6)
CHLORIDE SERPL-SCNC: 102 MMOL/L (ref 99–110)
CO2 SERPL-SCNC: 26 MMOL/L (ref 21–32)
CREAT SERPL-MCNC: 1 MG/DL (ref 0.9–1.3)
DEPRECATED RDW RBC AUTO: 13 % (ref 12.4–15.4)
GFR SERPLBLD CREATININE-BSD FMLA CKD-EPI: >60 ML/MIN/{1.73_M2}
GLUCOSE SERPL-MCNC: 117 MG/DL (ref 70–99)
HCT VFR BLD AUTO: 38.7 % (ref 40.5–52.5)
HGB BLD-MCNC: 13.7 G/DL (ref 13.5–17.5)
MAGNESIUM SERPL-MCNC: 2.2 MG/DL (ref 1.8–2.4)
MCH RBC QN AUTO: 31.4 PG (ref 26–34)
MCHC RBC AUTO-ENTMCNC: 35.4 G/DL (ref 31–36)
MCV RBC AUTO: 88.9 FL (ref 80–100)
PHOSPHATE SERPL-MCNC: 3.9 MG/DL (ref 2.5–4.9)
PLATELET # BLD AUTO: 211 K/UL (ref 135–450)
PMV BLD AUTO: 7.7 FL (ref 5–10.5)
POTASSIUM SERPL-SCNC: 4.2 MMOL/L (ref 3.5–5.1)
RBC # BLD AUTO: 4.35 M/UL (ref 4.2–5.9)
SODIUM SERPL-SCNC: 138 MMOL/L (ref 136–145)
WBC # BLD AUTO: 12.2 K/UL (ref 4–11)

## 2023-12-03 PROCEDURE — 85027 COMPLETE CBC AUTOMATED: CPT

## 2023-12-03 PROCEDURE — 80069 RENAL FUNCTION PANEL: CPT

## 2023-12-03 PROCEDURE — 83735 ASSAY OF MAGNESIUM: CPT

## 2023-12-03 PROCEDURE — APPNB30 APP NON BILLABLE TIME 0-30 MINS: Performed by: NURSE PRACTITIONER

## 2023-12-03 PROCEDURE — 6360000002 HC RX W HCPCS: Performed by: NURSE PRACTITIONER

## 2023-12-03 PROCEDURE — 6370000000 HC RX 637 (ALT 250 FOR IP)

## 2023-12-03 PROCEDURE — 36415 COLL VENOUS BLD VENIPUNCTURE: CPT

## 2023-12-03 PROCEDURE — 6370000000 HC RX 637 (ALT 250 FOR IP): Performed by: NURSE PRACTITIONER

## 2023-12-03 PROCEDURE — 99024 POSTOP FOLLOW-UP VISIT: CPT | Performed by: NURSE PRACTITIONER

## 2023-12-03 PROCEDURE — 6370000000 HC RX 637 (ALT 250 FOR IP): Performed by: STUDENT IN AN ORGANIZED HEALTH CARE EDUCATION/TRAINING PROGRAM

## 2023-12-03 RX ORDER — LEVETIRACETAM 750 MG/1
1500 TABLET ORAL 2 TIMES DAILY
Qty: 60 TABLET | Refills: 3 | Status: ON HOLD | OUTPATIENT
Start: 2023-12-03

## 2023-12-03 RX ADMIN — ACETAMINOPHEN 1000 MG: 500 TABLET ORAL at 00:05

## 2023-12-03 RX ADMIN — PANTOPRAZOLE SODIUM 40 MG: 40 TABLET, DELAYED RELEASE ORAL at 06:40

## 2023-12-03 RX ADMIN — DEXAMETHASONE 4 MG: 4 TABLET ORAL at 00:05

## 2023-12-03 RX ADMIN — DEXAMETHASONE 4 MG: 4 TABLET ORAL at 06:40

## 2023-12-03 RX ADMIN — OXYCODONE 10 MG: 5 TABLET ORAL at 03:50

## 2023-12-03 RX ADMIN — LEVETIRACETAM 1500 MG: 750 TABLET, FILM COATED ORAL at 09:42

## 2023-12-03 RX ADMIN — HEPARIN SODIUM 5000 UNITS: 5000 INJECTION INTRAVENOUS; SUBCUTANEOUS at 06:41

## 2023-12-03 RX ADMIN — METHOCARBAMOL TABLETS 750 MG: 750 TABLET, COATED ORAL at 09:42

## 2023-12-03 RX ADMIN — NIFEDIPINE 30 MG: 30 TABLET, EXTENDED RELEASE ORAL at 09:43

## 2023-12-03 RX ADMIN — OXYCODONE 10 MG: 5 TABLET ORAL at 00:06

## 2023-12-03 RX ADMIN — Medication 100 MG: at 09:42

## 2023-12-03 ASSESSMENT — PAIN DESCRIPTION - DESCRIPTORS: DESCRIPTORS: ACHING

## 2023-12-03 ASSESSMENT — PAIN SCALES - GENERAL
PAINLEVEL_OUTOF10: 10
PAINLEVEL_OUTOF10: 10
PAINLEVEL_OUTOF10: 8
PAINLEVEL_OUTOF10: 5

## 2023-12-03 ASSESSMENT — PAIN DESCRIPTION - ORIENTATION: ORIENTATION: RIGHT

## 2023-12-03 ASSESSMENT — PAIN - FUNCTIONAL ASSESSMENT: PAIN_FUNCTIONAL_ASSESSMENT: ACTIVITIES ARE NOT PREVENTED

## 2023-12-03 ASSESSMENT — PAIN DESCRIPTION - LOCATION: LOCATION: HEAD

## 2023-12-03 NOTE — PLAN OF CARE
Problem: Neurosensory - Adult  Goal: Achieves stable or improved neurological status  Outcome: Adequate for Discharge  Goal: Absence of seizures  Outcome: Progressing  Goal: Remains free of injury related to seizures activity  Outcome: Progressing  Goal: Achieves maximal functionality and self care  Outcome: Adequate for Discharge     Problem: Pain  Goal: Verbalizes/displays adequate comfort level or baseline comfort level  Outcome: Progressing   Encourage patient to monitor pain and request assistance   Assess pain using appropriate pain scale   Administer analgesics based on type and severity of pain and evaluate response   Implement non-pharmacological measures as appropriate and evaluate response     Problem: Safety - Adult  Goal: Free from fall injury  Outcome: Adequate for Discharge  Instruct family/caregiver on patient safety     Problem: Genitourinary - Adult  Goal: Absence of urinary retention  Outcome: Adequate for Discharge     Problem: Discharge Planning  Goal: Discharge to home or other facility with appropriate resources  Outcome: Progressing  Identify barriers to discharge with patient and caregiver

## 2023-12-03 NOTE — DISCHARGE SUMMARY
V2.0  Discharge Summary    Name:  Jaya Sal /Age/Sex: 1970 (47 y.o. male)   Admit Date: 2023  Discharge Date: 12/3/23    MRN & CSN:  [id number] & [id number] Encounter Date and Time 12/3/23 9:45 AM EST    Attending:  Julia Flood MD Discharging Provider: Julia Flood MD       Hospital Course:     Brief HPI: Jaya Sal is a 47 y.o. male who presented with ***    Brief Problem Based Course:   ***      The patient expressed appropriate understanding of, and agreement with the discharge recommendations, medications, and plan. Consults this admission:  IP CONSULT TO NEUROCRITICAL CARE  IP CONSULT TO NEUROCRITICAL CARE  IP CONSULT TO CRITICAL CARE  IP CONSULT TO NEUROSURGERY  IP CONSULT TO NEUROCRITICAL CARE  IP CONSULT TO ONCOLOGY  IP CONSULT TO RADIATION ONCOLOGY    Discharge Diagnosis:   Brain mass    ***    Discharge Instruction:   Follow up appointments: ***  Primary care physician: Mandie HERNANDEZ within 2 weeks  Diet: {diet:92317}   Activity: {discharge activity:36406}  Disposition: Discharged to:   []Home, []C, []SNF, []Acute Rehab, []Hospice ***  Condition on discharge: Stable  Labs and Tests to be Followed up as an outpatient by PCP or Specialist: ***    Discharge Medications:        Medication List        START taking these medications      dexamethasone 4 MG tablet  Commonly known as: DECADRON  Take 1 tablet by mouth every 6 hours for 2 days, THEN 1 tablet every 8 hours for 2 days, THEN 1 tablet 2 times daily (with meals) for 2 days, THEN 1 tablet daily (with breakfast) for 2 days.   Start taking on: 2023     methocarbamol 750 MG tablet  Commonly known as: ROBAXIN  Take 1 tablet by mouth every 6 hours as needed (muscle tightness)     ondansetron 4 MG disintegrating tablet  Commonly known as: ZOFRAN-ODT  Take 1 tablet by mouth every 12 hours as needed for Nausea or Vomiting     oxyCODONE 5 MG immediate release tablet  Commonly known as: ROXICODONE  Take 1-2 tablets by

## 2023-12-10 ENCOUNTER — HOSPITAL ENCOUNTER (INPATIENT)
Age: 53
LOS: 2 days | Discharge: HOME | DRG: 101 | End: 2023-12-12
Attending: STUDENT IN AN ORGANIZED HEALTH CARE EDUCATION/TRAINING PROGRAM | Admitting: INTERNAL MEDICINE
Payer: COMMERCIAL

## 2023-12-10 ENCOUNTER — APPOINTMENT (OUTPATIENT)
Dept: CT IMAGING | Age: 53
DRG: 101 | End: 2023-12-10
Payer: COMMERCIAL

## 2023-12-10 DIAGNOSIS — R56.9 FOCAL SEIZURES (HCC): ICD-10-CM

## 2023-12-10 DIAGNOSIS — R56.9 SEIZURE (HCC): Primary | ICD-10-CM

## 2023-12-10 DIAGNOSIS — Z98.890 S/P CRANIOTOMY: ICD-10-CM

## 2023-12-10 DIAGNOSIS — S00.03XD HEMATOMA OF SCALP, SUBSEQUENT ENCOUNTER: ICD-10-CM

## 2023-12-10 LAB
ANION GAP SERPL CALCULATED.3IONS-SCNC: 10 MMOL/L (ref 3–16)
BASOPHILS # BLD: 0 K/UL (ref 0–0.2)
BASOPHILS NFR BLD: 0 %
BUN SERPL-MCNC: 23 MG/DL (ref 7–20)
CALCIUM SERPL-MCNC: 8.9 MG/DL (ref 8.3–10.6)
CHLORIDE SERPL-SCNC: 101 MMOL/L (ref 99–110)
CO2 SERPL-SCNC: 25 MMOL/L (ref 21–32)
CREAT SERPL-MCNC: 1 MG/DL (ref 0.9–1.3)
DEPRECATED RDW RBC AUTO: 13.9 % (ref 12.4–15.4)
EOSINOPHIL # BLD: 0.2 K/UL (ref 0–0.6)
EOSINOPHIL NFR BLD: 1 %
GFR SERPLBLD CREATININE-BSD FMLA CKD-EPI: >60 ML/MIN/{1.73_M2}
GLUCOSE SERPL-MCNC: 103 MG/DL (ref 70–99)
HCT VFR BLD AUTO: 41.7 % (ref 40.5–52.5)
HGB BLD-MCNC: 14.1 G/DL (ref 13.5–17.5)
LYMPHOCYTES # BLD: 1.5 K/UL (ref 1–5.1)
LYMPHOCYTES NFR BLD: 9 %
MCH RBC QN AUTO: 30.8 PG (ref 26–34)
MCHC RBC AUTO-ENTMCNC: 33.7 G/DL (ref 31–36)
MCV RBC AUTO: 91.2 FL (ref 80–100)
MONOCYTES # BLD: 1.4 K/UL (ref 0–1.3)
MONOCYTES NFR BLD: 8 %
NEUTROPHILS # BLD: 13.9 K/UL (ref 1.7–7.7)
NEUTROPHILS NFR BLD: 78 %
NEUTS BAND NFR BLD MANUAL: 4 % (ref 0–7)
PLATELET # BLD AUTO: 345 K/UL (ref 135–450)
PMV BLD AUTO: 6.8 FL (ref 5–10.5)
POTASSIUM SERPL-SCNC: 4.1 MMOL/L (ref 3.5–5.1)
RBC # BLD AUTO: 4.57 M/UL (ref 4.2–5.9)
SODIUM SERPL-SCNC: 136 MMOL/L (ref 136–145)
WBC # BLD AUTO: 17 K/UL (ref 4–11)

## 2023-12-10 PROCEDURE — 2580000003 HC RX 258: Performed by: NURSE PRACTITIONER

## 2023-12-10 PROCEDURE — 96375 TX/PRO/DX INJ NEW DRUG ADDON: CPT

## 2023-12-10 PROCEDURE — C9254 INJECTION, LACOSAMIDE: HCPCS | Performed by: NURSE PRACTITIONER

## 2023-12-10 PROCEDURE — 6360000002 HC RX W HCPCS: Performed by: INTERNAL MEDICINE

## 2023-12-10 PROCEDURE — 85025 COMPLETE CBC W/AUTO DIFF WBC: CPT

## 2023-12-10 PROCEDURE — 96374 THER/PROPH/DIAG INJ IV PUSH: CPT

## 2023-12-10 PROCEDURE — 6370000000 HC RX 637 (ALT 250 FOR IP): Performed by: NURSE PRACTITIONER

## 2023-12-10 PROCEDURE — 6370000000 HC RX 637 (ALT 250 FOR IP): Performed by: INTERNAL MEDICINE

## 2023-12-10 PROCEDURE — 6360000002 HC RX W HCPCS: Performed by: NURSE PRACTITIONER

## 2023-12-10 PROCEDURE — 99222 1ST HOSP IP/OBS MODERATE 55: CPT | Performed by: NURSE PRACTITIONER

## 2023-12-10 PROCEDURE — 2580000003 HC RX 258: Performed by: INTERNAL MEDICINE

## 2023-12-10 PROCEDURE — C9254 INJECTION, LACOSAMIDE: HCPCS | Performed by: INTERNAL MEDICINE

## 2023-12-10 PROCEDURE — 70450 CT HEAD/BRAIN W/O DYE: CPT

## 2023-12-10 PROCEDURE — 80048 BASIC METABOLIC PNL TOTAL CA: CPT

## 2023-12-10 PROCEDURE — 95812 EEG 41-60 MINUTES: CPT

## 2023-12-10 PROCEDURE — 2060000000 HC ICU INTERMEDIATE R&B

## 2023-12-10 PROCEDURE — 99285 EMERGENCY DEPT VISIT HI MDM: CPT

## 2023-12-10 PROCEDURE — 95700 EEG CONT REC W/VID EEG TECH: CPT

## 2023-12-10 RX ORDER — LEVETIRACETAM 500 MG/5ML
2000 INJECTION, SOLUTION, CONCENTRATE INTRAVENOUS EVERY 12 HOURS
Status: DISCONTINUED | OUTPATIENT
Start: 2023-12-10 | End: 2023-12-11

## 2023-12-10 RX ORDER — MECOBALAMIN 5000 MCG
10 TABLET,DISINTEGRATING ORAL NIGHTLY PRN
Status: DISCONTINUED | OUTPATIENT
Start: 2023-12-10 | End: 2023-12-12 | Stop reason: HOSPADM

## 2023-12-10 RX ORDER — POLYETHYLENE GLYCOL 3350 17 G/17G
17 POWDER, FOR SOLUTION ORAL DAILY PRN
Status: DISCONTINUED | OUTPATIENT
Start: 2023-12-10 | End: 2023-12-12 | Stop reason: HOSPADM

## 2023-12-10 RX ORDER — ACETAMINOPHEN 650 MG/1
650 SUPPOSITORY RECTAL EVERY 6 HOURS PRN
Status: DISCONTINUED | OUTPATIENT
Start: 2023-12-10 | End: 2023-12-12 | Stop reason: HOSPADM

## 2023-12-10 RX ORDER — ACETAMINOPHEN 325 MG/1
650 TABLET ORAL EVERY 6 HOURS PRN
Status: DISCONTINUED | OUTPATIENT
Start: 2023-12-10 | End: 2023-12-12 | Stop reason: HOSPADM

## 2023-12-10 RX ORDER — SODIUM CHLORIDE 0.9 % (FLUSH) 0.9 %
5-40 SYRINGE (ML) INJECTION EVERY 12 HOURS SCHEDULED
Status: DISCONTINUED | OUTPATIENT
Start: 2023-12-10 | End: 2023-12-12 | Stop reason: HOSPADM

## 2023-12-10 RX ORDER — LANOLIN ALCOHOL/MO/W.PET/CERES
400 CREAM (GRAM) TOPICAL DAILY
Status: DISCONTINUED | OUTPATIENT
Start: 2023-12-10 | End: 2023-12-12 | Stop reason: HOSPADM

## 2023-12-10 RX ORDER — POTASSIUM CHLORIDE 20 MEQ/1
40 TABLET, EXTENDED RELEASE ORAL PRN
Status: DISCONTINUED | OUTPATIENT
Start: 2023-12-10 | End: 2023-12-12 | Stop reason: HOSPADM

## 2023-12-10 RX ORDER — PANTOPRAZOLE SODIUM 40 MG/1
40 TABLET, DELAYED RELEASE ORAL
Status: DISCONTINUED | OUTPATIENT
Start: 2023-12-11 | End: 2023-12-12 | Stop reason: HOSPADM

## 2023-12-10 RX ORDER — POTASSIUM CHLORIDE 7.45 MG/ML
10 INJECTION INTRAVENOUS PRN
Status: DISCONTINUED | OUTPATIENT
Start: 2023-12-10 | End: 2023-12-12 | Stop reason: HOSPADM

## 2023-12-10 RX ORDER — NIFEDIPINE 30 MG/1
30 TABLET, FILM COATED, EXTENDED RELEASE ORAL DAILY
Status: DISCONTINUED | OUTPATIENT
Start: 2023-12-11 | End: 2023-12-12 | Stop reason: HOSPADM

## 2023-12-10 RX ORDER — MAGNESIUM SULFATE IN WATER 40 MG/ML
2000 INJECTION, SOLUTION INTRAVENOUS PRN
Status: DISCONTINUED | OUTPATIENT
Start: 2023-12-10 | End: 2023-12-12 | Stop reason: HOSPADM

## 2023-12-10 RX ORDER — LEVETIRACETAM 500 MG/5ML
2000 INJECTION, SOLUTION, CONCENTRATE INTRAVENOUS ONCE
Status: COMPLETED | OUTPATIENT
Start: 2023-12-10 | End: 2023-12-10

## 2023-12-10 RX ORDER — ENOXAPARIN SODIUM 100 MG/ML
40 INJECTION SUBCUTANEOUS DAILY
Status: DISCONTINUED | OUTPATIENT
Start: 2023-12-10 | End: 2023-12-12 | Stop reason: HOSPADM

## 2023-12-10 RX ORDER — SODIUM CHLORIDE 0.9 % (FLUSH) 0.9 %
5-40 SYRINGE (ML) INJECTION PRN
Status: DISCONTINUED | OUTPATIENT
Start: 2023-12-10 | End: 2023-12-12 | Stop reason: HOSPADM

## 2023-12-10 RX ORDER — POLYETHYLENE GLYCOL 3350 17 G/17G
17 POWDER, FOR SOLUTION ORAL DAILY PRN
Status: DISCONTINUED | OUTPATIENT
Start: 2023-12-10 | End: 2023-12-10 | Stop reason: SDUPTHER

## 2023-12-10 RX ORDER — SENNA AND DOCUSATE SODIUM 50; 8.6 MG/1; MG/1
2 TABLET, FILM COATED ORAL 2 TIMES DAILY PRN
Status: DISCONTINUED | OUTPATIENT
Start: 2023-12-10 | End: 2023-12-12 | Stop reason: HOSPADM

## 2023-12-10 RX ORDER — GAUZE BANDAGE 2" X 2"
100 BANDAGE TOPICAL DAILY
Status: DISCONTINUED | OUTPATIENT
Start: 2023-12-10 | End: 2023-12-12 | Stop reason: HOSPADM

## 2023-12-10 RX ORDER — SODIUM CHLORIDE 9 MG/ML
INJECTION, SOLUTION INTRAVENOUS PRN
Status: DISCONTINUED | OUTPATIENT
Start: 2023-12-10 | End: 2023-12-12 | Stop reason: HOSPADM

## 2023-12-10 RX ORDER — ONDANSETRON 4 MG/1
4 TABLET, ORALLY DISINTEGRATING ORAL EVERY 12 HOURS PRN
Status: DISCONTINUED | OUTPATIENT
Start: 2023-12-10 | End: 2023-12-12 | Stop reason: HOSPADM

## 2023-12-10 RX ORDER — MIDAZOLAM HYDROCHLORIDE 1 MG/ML
3 INJECTION, SOLUTION INTRAMUSCULAR; INTRAVENOUS ONCE
Status: COMPLETED | OUTPATIENT
Start: 2023-12-10 | End: 2023-12-10

## 2023-12-10 RX ADMIN — Medication 10 MG: at 21:52

## 2023-12-10 RX ADMIN — LEVETIRACETAM 2000 MG: 100 INJECTION INTRAVENOUS at 16:31

## 2023-12-10 RX ADMIN — MIDAZOLAM 3 MG: 1 INJECTION INTRAMUSCULAR; INTRAVENOUS at 16:38

## 2023-12-10 RX ADMIN — LACOSAMIDE 400 MG: 10 INJECTION INTRAVENOUS at 17:09

## 2023-12-10 RX ADMIN — SODIUM CHLORIDE, PRESERVATIVE FREE 10 ML: 5 INJECTION INTRAVENOUS at 20:22

## 2023-12-10 RX ADMIN — LACOSAMIDE 100 MG: 10 INJECTION INTRAVENOUS at 21:58

## 2023-12-10 RX ADMIN — ENOXAPARIN SODIUM 40 MG: 100 INJECTION SUBCUTANEOUS at 20:10

## 2023-12-10 RX ADMIN — LEVETIRACETAM 2000 MG: 500 INJECTION, SOLUTION, CONCENTRATE INTRAVENOUS at 20:10

## 2023-12-10 RX ADMIN — NYSTATIN 500000 UNITS: 100000 SUSPENSION ORAL at 20:10

## 2023-12-10 ASSESSMENT — PAIN SCALES - GENERAL: PAINLEVEL_OUTOF10: 10

## 2023-12-10 ASSESSMENT — PAIN DESCRIPTION - LOCATION: LOCATION: HEAD

## 2023-12-10 ASSESSMENT — PAIN - FUNCTIONAL ASSESSMENT: PAIN_FUNCTIONAL_ASSESSMENT: 0-10

## 2023-12-10 NOTE — H&P
V2.0  History and Physical      Name:  Rick Sousa /Age/Sex: 1970  (47 y.o. male)   MRN & CSN:  [id number] & [id number] Encounter Date/Time: 12/10/2023 5:03 PM EST   Location:  A08/A08-08 PCP: Scott [address] [place] [address] Drive Day: 1    Assessment and Plan:   Rick Sousa is a 47 y.o. male with a pmh of right temporal glioma s/p resection 2023 who presents with Focal seizures [facility]    Hospital Problems             Last Modified POA    * (Principal) Focal seizures ([address]) 12/10/2023 Yes     Focal status epilepticus  Onset of symptoms 2 PM 12/10/2023  Symptoms of facial twitching, headache as well as slurring of speech  CT head without contrast done in the emergency room shows postoperative changes of right frontotemporal craniotomy with stable appearance of the surgical cavity in the right temporal lobe  Patient has been on Keppra at home since his surgery, reports compliance. Keppra dose has been increased to 2 g twice daily, continue  Also given Versed 3 mg IV in the ER and plan to load with Vimpat  Continuous EEG  Neurology on board  Plan to consult neurosurgery in the a.m. Further imagings after discussion with neurosurgery  Watch off of antibiotics for now. History of right temporal glioma, s/p resection on 2023  Has been on Keppra and dexamethasone since. Dexamethasone discontinued 2023 due to oral thrush  Keppra dose increased today, continue  Continue with nifedipine    Leukocytosis  Likely due to dexamethasone use  Plan to initiate antibiotics if he develops fever. Denies any fevers at home.       Hypertension  On hydralazine, hold for now      Disposition:   Current Living situation:   Expected Disposition:   Estimated D/C:     Diet No diet orders on file   DVT Prophylaxis [x] Lovenox, []  Heparin, [] SCDs, [] Ambulation,  [] Eliquis, [] Xarelto, [] Coumadin   Code Status Prior   Surrogate Decision Maker/ POA      History from:     patient    History of Present Illness: Determinants of Health      Transportation Problems [facility] HRSN)   Housing Stability: Low Risk  (11/27/2023)    Housing Stability Vital Sign     Unable to Pay for Housing in the Last Year: No     Number of Places Lived in the Last Year: 1     Unstable Housing in the Last Year: No       Medications:   Medications:    lacosamide (VIMPAT) 400 mg in sodium chloride 0.9 % 90 mL IVPB  400 mg IntraVENous Once    Followed by    lacosamide (VIMPAT) 100 mg in sodium chloride 0.9 % 60 mL IVPB  100 mg IntraVENous BID    levETIRAcetam  2,000 mg IntraVENous Q12H      Infusions:   PRN Meds:      Labs      CBC:   Recent Labs     12/10/23  1522   WBC 17.0*   HGB 14.1        BMP:    Recent Labs     12/10/23  1522      K 4.1      CO2 25   BUN 23*   CREATININE 1.0   GLUCOSE 103*     Hepatic: No results for input(s): \"AST\", \"ALT\", \"ALB\", \"BILITOT\", \"ALKPHOS\" in the last 72 hours. Lipids:   Lab Results   Component Value Date/Time    CHOL 172 11/28/2023 06:37 AM    HDL 27 11/28/2023 06:37 AM    TRIG 121 11/28/2023 06:37 AM     Hemoglobin A1C:   Lab Results   Component Value Date/Time    LABA1C 4.8 11/29/2023 09:32 AM     TSH: No results found for: \"TSH\"  Troponin: No results found for: \"TROPONINT\"  Lactic Acid: No results for input(s): \"LACTA\" in the last 72 hours. BNP: No results for input(s): \"PROBNP\" in the last 72 hours.   UA:  Lab Results   Component Value Date/Time    NITRU Negative 11/27/2023 07:50 PM    COLORU Yellow 11/27/2023 07:50 PM    PHUR 6.5 11/27/2023 08:00 PM    WBCUA 4 10/29/2023 03:40 AM    RBCUA 2 10/29/2023 03:40 AM    BACTERIA None Seen 10/29/2023 03:40 AM    CLARITYU Clear 11/27/2023 07:50 PM    SPECGRAV 1.015 11/27/2023 07:50 PM    LEUKOCYTESUR Negative 11/27/2023 07:50 PM    UROBILINOGEN 0.2 11/27/2023 07:50 PM    BILIRUBINUR Negative 11/27/2023 07:50 PM    BLOODU Negative 11/27/2023 07:50 PM    GLUCOSEU Negative 11/27/2023 07:50 PM    KETUA Negative 11/27/2023 07:50 PM     Urine Cultures: No

## 2023-12-10 NOTE — PROGRESS NOTES
Pt arrived to room 5501, pt was oriented to his room and call light. Bed alarm on for pt safety. EEG Tech is here to place the pt on continuous EEG. Pt states that he hasn't had any facial twitching since he got his medications in the ED. Per ED nurse he hasn't witnessed any twitching or other seizure like activity. The pt states that he knows when it is about to happen and when it happens. The pt request to keep his own pants on, per EEG tech the pt will need to remove his shirt, pt was made aware of this and did remove his shirt. The pt is aware that he was made NPO, pt understands and has no questions for me at this time. Pt states he took all of his daily medications this morning. I will continue to follow throughout the shift. Call light in reach and bed alarm on.  Candice Poole RN

## 2023-12-10 NOTE — PROGRESS NOTES
4 Eyes Skin Assessment     NAME:  Nkechi Jenkins  YOB: 1970  MEDICAL RECORD NUMBER:  [id number]    The patient is being assessed for  Admission    I agree that at least one RN has performed a thorough Head to Toe Skin Assessment on the patient. ALL assessment sites listed below have been assessed. Areas assessed by both nurses:    Head, Face, Ears, Shoulders, Back, Chest, Arms, Elbows, Hands, Sacrum. Buttock, Coccyx, Ischium, Legs. Feet and Heels, and Under Medical Devices         Does the Patient have a Wound? No noted wound(s), only wound pt has is a surgical incision from his Crani. Right side of head.         Kendell Prevention initiated by RN: No  Wound Care Orders initiated by RN: No    Pressure Injury (Stage 3,4, Unstageable, DTI, NWPT, and Complex wounds) if present, place Wound referral order by RN under : No    New Ostomies, if present place, Ostomy referral order under : No     Nurse 1 eSignature: Electronically signed by Fanta Cesar RN on 12/10/23 at 6:27 PM EST    **SHARE this note so that the co-signing nurse can place an eSignature**    Nurse 2 eSignature: Electronically signed by David Acosta RN on 12/10/23 at 6:28 PM EST

## 2023-12-10 NOTE — CONSULTS
NEUROLOGY CONSULT NOTE       Patient: Jr James MRN: [id number]    YOB: 1970  Age: 47 y.o. 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 Presents w/ constant facial twitching consistent w/ epilepsia partialis continua     RECOMMENDATIONS / PLAN:  Admit to inpatient - hospitalist  Okay for floor - 5T  Neuro checks Q4H  Versed 3mg IV x 1 now  Increase keppra 2gm BID, first dose now  Start vimpat 400mg IV x 1, followed by 100mg BID  cvEEG  Seizure precautions   Will need neurosurgery consult in AM  Will d/w neurosurgery if repeated MRI is needed   If he develops a fever would treat empirically for post operative infection while gathering more data. We will continue to follow     Management and plan discussed with:  Nursing staff  Dr. Georgette Marmolejo  ED doc    MIN Felix - CNP   NEUROLOGY  12/10/2023 4:00 PM  [facility]: [address] [place] [address] NEUROLOGY  History of Present Illness     Jr James is a 47 y.o. y/o male with HTN, seizures, recent glioma resection on 11/30/23 who presents with constant left-sided facial twitching that started around 2 PM today. He had this previously while admitted for his acute cranial resection. He was treated with Keppra 1500 mg twice daily, which resolved the symptoms. Patient was ultimately discharged home. He denies any missed doses of his Keppra. Head CT admission to ED shows expected postop changes from previous recent craniotomy. He does have large scalp hematoma overlying craniotomy site.   Per patient's wife and patient this has not changed much since they have MEDICATIONS   Inpatient Medications      Infusions      Antibiotics   Recent Abx Admin        No antibiotic orders with administrations found.

## 2023-12-11 PROBLEM — S00.03XA HEMATOMA OF SCALP: Status: ACTIVE | Noted: 2023-12-11

## 2023-12-11 LAB
BASOPHILS # BLD: 0 K/UL (ref 0–0.2)
BASOPHILS NFR BLD: 0 %
DEPRECATED RDW RBC AUTO: 13.6 % (ref 12.4–15.4)
EOSINOPHIL # BLD: 0 K/UL (ref 0–0.6)
EOSINOPHIL NFR BLD: 0 %
HCT VFR BLD AUTO: 41.6 % (ref 40.5–52.5)
HGB BLD-MCNC: 14.3 G/DL (ref 13.5–17.5)
LYMPHOCYTES # BLD: 1.7 K/UL (ref 1–5.1)
LYMPHOCYTES NFR BLD: 14 %
MCH RBC QN AUTO: 31.3 PG (ref 26–34)
MCHC RBC AUTO-ENTMCNC: 34.4 G/DL (ref 31–36)
MCV RBC AUTO: 90.8 FL (ref 80–100)
MONOCYTES # BLD: 0.8 K/UL (ref 0–1.3)
MONOCYTES NFR BLD: 7 %
NEUTROPHILS # BLD: 9.4 K/UL (ref 1.7–7.7)
NEUTROPHILS NFR BLD: 79 %
PLATELET # BLD AUTO: 312 K/UL (ref 135–450)
PMV BLD AUTO: 6.8 FL (ref 5–10.5)
RBC # BLD AUTO: 4.58 M/UL (ref 4.2–5.9)
RBC MORPH BLD: NORMAL
WBC # BLD AUTO: 11.9 K/UL (ref 4–11)

## 2023-12-11 PROCEDURE — 85025 COMPLETE CBC W/AUTO DIFF WBC: CPT

## 2023-12-11 PROCEDURE — 2060000000 HC ICU INTERMEDIATE R&B

## 2023-12-11 PROCEDURE — 6370000000 HC RX 637 (ALT 250 FOR IP): Performed by: NURSE PRACTITIONER

## 2023-12-11 PROCEDURE — 6360000002 HC RX W HCPCS: Performed by: INTERNAL MEDICINE

## 2023-12-11 PROCEDURE — C9254 INJECTION, LACOSAMIDE: HCPCS | Performed by: INTERNAL MEDICINE

## 2023-12-11 PROCEDURE — 36415 COLL VENOUS BLD VENIPUNCTURE: CPT

## 2023-12-11 PROCEDURE — 2580000003 HC RX 258: Performed by: INTERNAL MEDICINE

## 2023-12-11 PROCEDURE — 95713 VEEG 2-12 HR CONT MNTR: CPT

## 2023-12-11 PROCEDURE — 80177 DRUG SCRN QUAN LEVETIRACETAM: CPT

## 2023-12-11 PROCEDURE — 99024 POSTOP FOLLOW-UP VISIT: CPT | Performed by: NURSE PRACTITIONER

## 2023-12-11 PROCEDURE — 6370000000 HC RX 637 (ALT 250 FOR IP): Performed by: INTERNAL MEDICINE

## 2023-12-11 PROCEDURE — 99232 SBSQ HOSP IP/OBS MODERATE 35: CPT | Performed by: NURSE PRACTITIONER

## 2023-12-11 RX ORDER — TRAMADOL HYDROCHLORIDE 50 MG/1
50 TABLET ORAL EVERY 6 HOURS PRN
Status: DISCONTINUED | OUTPATIENT
Start: 2023-12-11 | End: 2023-12-12 | Stop reason: HOSPADM

## 2023-12-11 RX ORDER — LACOSAMIDE 50 MG/1
100 TABLET ORAL 2 TIMES DAILY
Status: DISCONTINUED | OUTPATIENT
Start: 2023-12-11 | End: 2023-12-12 | Stop reason: HOSPADM

## 2023-12-11 RX ORDER — METHOCARBAMOL 750 MG/1
750 TABLET, FILM COATED ORAL EVERY 6 HOURS PRN
Status: DISCONTINUED | OUTPATIENT
Start: 2023-12-11 | End: 2023-12-12 | Stop reason: HOSPADM

## 2023-12-11 RX ADMIN — SODIUM CHLORIDE, PRESERVATIVE FREE 10 ML: 5 INJECTION INTRAVENOUS at 20:30

## 2023-12-11 RX ADMIN — METHOCARBAMOL 750 MG: 750 TABLET ORAL at 18:47

## 2023-12-11 RX ADMIN — LACOSAMIDE 100 MG: 50 TABLET, FILM COATED ORAL at 20:30

## 2023-12-11 RX ADMIN — LEVETIRACETAM 2000 MG: 750 TABLET, FILM COATED ORAL at 20:29

## 2023-12-11 RX ADMIN — NIFEDIPINE 30 MG: 30 TABLET, EXTENDED RELEASE ORAL at 09:32

## 2023-12-11 RX ADMIN — ENOXAPARIN SODIUM 40 MG: 100 INJECTION SUBCUTANEOUS at 09:41

## 2023-12-11 RX ADMIN — NYSTATIN 500000 UNITS: 100000 SUSPENSION ORAL at 18:34

## 2023-12-11 RX ADMIN — NYSTATIN 500000 UNITS: 100000 SUSPENSION ORAL at 09:31

## 2023-12-11 RX ADMIN — SODIUM CHLORIDE, PRESERVATIVE FREE 10 ML: 5 INJECTION INTRAVENOUS at 09:33

## 2023-12-11 RX ADMIN — NYSTATIN 500000 UNITS: 100000 SUSPENSION ORAL at 14:18

## 2023-12-11 RX ADMIN — NYSTATIN 500000 UNITS: 100000 SUSPENSION ORAL at 20:29

## 2023-12-11 RX ADMIN — LEVETIRACETAM 2000 MG: 500 INJECTION, SOLUTION, CONCENTRATE INTRAVENOUS at 08:46

## 2023-12-11 RX ADMIN — Medication 10 MG: at 20:30

## 2023-12-11 RX ADMIN — PANTOPRAZOLE SODIUM 40 MG: 40 TABLET, DELAYED RELEASE ORAL at 09:32

## 2023-12-11 RX ADMIN — LACOSAMIDE 100 MG: 10 INJECTION INTRAVENOUS at 09:07

## 2023-12-11 RX ADMIN — TRAMADOL HYDROCHLORIDE 50 MG: 50 TABLET ORAL at 19:09

## 2023-12-11 RX ADMIN — Medication 400 MG: at 09:32

## 2023-12-11 RX ADMIN — THIAMINE HCL TAB 100 MG 100 MG: 100 TAB at 09:32

## 2023-12-11 ASSESSMENT — PAIN DESCRIPTION - PAIN TYPE: TYPE: SURGICAL PAIN

## 2023-12-11 ASSESSMENT — PAIN DESCRIPTION - ORIENTATION
ORIENTATION: RIGHT
ORIENTATION: RIGHT

## 2023-12-11 ASSESSMENT — PAIN DESCRIPTION - FREQUENCY: FREQUENCY: CONTINUOUS

## 2023-12-11 ASSESSMENT — PAIN DESCRIPTION - DIRECTION: RADIATING_TOWARDS: HEAD

## 2023-12-11 ASSESSMENT — PAIN SCALES - GENERAL
PAINLEVEL_OUTOF10: 0
PAINLEVEL_OUTOF10: 6

## 2023-12-11 ASSESSMENT — PAIN DESCRIPTION - LOCATION
LOCATION: HEAD
LOCATION: HEAD

## 2023-12-11 ASSESSMENT — PAIN - FUNCTIONAL ASSESSMENT: PAIN_FUNCTIONAL_ASSESSMENT: ACTIVITIES ARE NOT PREVENTED

## 2023-12-11 ASSESSMENT — PAIN DESCRIPTION - DESCRIPTORS: DESCRIPTORS: ACHING;DISCOMFORT

## 2023-12-11 ASSESSMENT — PAIN DESCRIPTION - ONSET: ONSET: ON-GOING

## 2023-12-11 NOTE — PROGRESS NOTES
Pt a/o x4, vss on room air. Pt not complaining of pain this shift. Incision cleansed with soap and water, painted with CHG. Pt tolerating fluids and foods PO. Pt voiding adequately via urinal. No acute neuro changes this shift. Seizure precautions remain in place, side rails up and seizure pads in place. All fall precautions are in place, call light within pt reach.

## 2023-12-11 NOTE — PROCEDURES
CONTINUOUS VIDEO ELECTROENCEPHALOGRAM (cvEEG) REPORT    Identifying Information:  Name: Aretha Soetlo  MRN: [id number]  : 1970  Attending Physician: Kirstie Lamas MD  Interpreting Physician: Andree Parker MD  Reporting Physician: Andree Parker MD,     Clinical History:  Aretha Sotelo is an 47 y.o. male who was admitted on 12/10/2023 with a primary diagnosis of possible seizures    Past Medical History:  Past Medical History:   Diagnosis Date    Hypertension     Seizure ([address])        Current Medications:    lacosamide (VIMPAT) 100 mg in sodium chloride 0.9 % 60 mL IVPB  100 mg IntraVENous BID    levETIRAcetam  2,000 mg IntraVENous Q12H    magnesium oxide  400 mg Oral Daily    NIFEdipine  30 mg Oral Daily    pantoprazole  40 mg Oral QAM AC    vitamin B-1  100 mg Oral Daily    sodium chloride flush  5-40 mL IntraVENous 2 times per day    enoxaparin  40 mg SubCUTAneous Daily    nystatin  5 mL Oral 4x Daily       cEEG start date & time: 12- at 11 pm  cEEG end date & time: 2023 at 7 am    Indication:  cEEG was initiated for monitoring and localization of seizures as the etiology of the altered mental status. It was available for review at the bedside as well as via remote access for the entire period of monitoring. Technical Summary:  32 channels of continuous EEG and video were recorded in a digital format on a patient who is reported to be awake and asleep during the recording. The background rhythm consists of 5-7 Hz activity, intermittent PDR was seen, reactivity to stimulation was seen. State changes present without clear sleep architecture. The recording is remarkable for the presence of:   Focal slowing in the form of continuous polymorphic delta / theta activity over the right hemisphere head region. Lateralized periodic discharges seen over right hemisphere occurring at 1 Hz.     During the recording:   Video was reviewed intermittently at times when significant amounts of EMG

## 2023-12-11 NOTE — DISCHARGE INSTRUCTIONS
Driving and Safety Risks: You have had an event concerning for a seizure. No driving until you have been event free for at least 3 months or cleared by your neurologist  Having a seizure while driving puts you at risk for injuring yourself or others. If you drive while having uncontrolled seizures, you may be held liable for injuries to others. If you live outside of [place] the driving limitation may be longer. You should check with applicable local laws prior to travel. You should avoid working at heights including claiming up ladders. You should avoid swimming alone or taking baths in the bathtub. It is recommended to use only showers.

## 2023-12-11 NOTE — PROGRESS NOTES
V2.0    [facility] [place]      Name:  Rosy Bourgeois /Age/Sex: 1970  (47 y.o. male)   MRN & CSN:  [id number] & [id number] Encounter Date/Time: 2023 11:56 AM EST   Location:  [facility]- PCP: Lizzie Agosto MD       Hospital Day: 2    Assessment and Recommendations   Rosy Bourgeois is a 47 y.o. male with pmh of seizure, HTN, recent craniotomy for glioma resection on 23 who presented on 12/10 from home with left facial twitching that started yesterday afternoon. He experienced this while recent admitted after craniotomy and it resolved with Keppra 1500 mg BID. He reports being compliant with AED. Head CT admission revealed expected post operative findings with improved MLS and persistent large scalp hematoma/seroma. Had mild leukocytosis on admission but was weaning from decadron. He denies drainage from incision at home, denies fevers. who presents with Focal seizures ([address])      Plan:   ocal status epilepticus  Onset of symptoms 2 PM 12/10/2023  Symptoms of facial twitching, headache as well as slurring of speech  CT head without contrast done in the emergency room shows postoperative changes of right frontotemporal craniotomy with stable appearance of the surgical cavity in the right temporal lobe    Neurology on board-neurology adjusted medications and okay to discharge;    CT head without contrast with stable but persistent fluid collection overlying the craniotomy site Dr. Tommie Scherer will see the patient this afternoon and possibly tap to remove the scalp fluid; patient to be watched overnight; can be discharged tomorrow; ensure oncology follow-up upon discharge; patient's primary medical oncologist is Dr. Zechariah Martinez; Watch off of antibiotics for now. History of right temporal glioma, s/p resection on 2023  Has been on Keppra and dexamethasone since.   Dexamethasone discontinued 2023 due to oral thrush  Keppra dose increased today,

## 2023-12-11 NOTE — CONSULTS
NEUROSURGERY CONSULT  Junaid Mehta  [id number]   2023    Requesting physician: Orvil Homans, MD    Reason for consultation: \"p/o crani for glioma resection with seizures\"    History of present illness: Junaid Mehta is a 47 y.o. y/o male with PMH of seizure, HTN, recent craniotomy for glioma resection on 23 who presented on 12/10 from home with left facial twitching that started yesterday afternoon. He experienced this while recent admitted after craniotomy and it resolved with Keppra 1500 mg BID. He reports being compliant with AED. Head CT admission revealed expected post operative findings with improved MLS and persistent large scalp hematoma/seroma. Had mild leukocytosis on admission but was weaning from decadron. He denies drainage from incision at home, denies fevers. Neurosurgery was consulted for recommendations. ROS:   GENERAL:  Denies fever or recent illness.  Denies weight changes   EYES:  Denies vision change or diplopia  EARS:  Denies hearing loss  CARDIAC:  Denies chest pain  RESPIRATORY:  Denies shortness of breath  SKIN:  Denies rash or lesions   HEM:  Denies excessive bruising  PSYCH:  Denies anxiety or depression  NEURO:  +headache, + seizures   :  Denies urinary difficulty  GI: Denies nausea, vomiting, diarrhea or constipation  MUSCULOSKELETAL:  No arthralgias    Allergies   Allergen Reactions    Iodine Swelling     IV CONTRAST DYE       Past Medical History:   Diagnosis Date    Hypertension     Seizure [facility])         Past Surgical History:   Procedure Laterality Date    CRANIOTOMY Right 2023    RIGHT TEMPORAL CRANIOTOMY FOR RESECTION OF BRAIN MASS performed by Nadine Padron MD at [address] History     Occupational History    Not on file   Tobacco Use    Smoking status: Every Day     Types: Cigarettes     Last attempt to quit: 2010     Years since quittin.4    Smokeless tobacco: Current     Types: Snuff   Vaping Use    Vaping tested   Assist devices: None   Tone: normal   Motor strength:    Right  Left    Right  Left    Deltoid  5 5  Hip Flex  5 5   Biceps  5 5  Knee Extensors  5 5   Triceps  5 5  Knee Flexors  5 5   Wrist Ext  5 5  Ankle Dorsiflex. 5 5   Wrist Flex  5 5  Ankle Plantarflex. 5 5   Handgrip  5 5  Ext Gerry Longus  5 5   Thumb Ext  5 5         Incision: staples TORRIE clean/dry/intact w/o active drainage, soft palpable fluid collection     Radiological Findings:  CT HEAD WO CONTRAST   Final Result      Postoperative changes of right frontotemporal craniotomy with stable appearance of the surgical cavity in the right temporal lobe. Mild residual, slightly decreased midline shift, now 3 mm at the septum pellucidum. Large scalp fluid collection overlying the craniotomy, likely seroma/aging hematoma. Electronically signed by Adeola Nieves MD            Labs  Recent Labs     12/10/23  1522         CO2 25   BUN 23*   CREATININE 1.0   GLUCOSE 103*     Recent Labs     12/10/23  1522 12/11/23  0701   WBC 17.0* 11.9*   RBC 4.57 4.58       Patient Active Problem List    Diagnosis Date Noted    Focal seizures ([address]) 12/10/2023    S/P craniotomy 12/01/2023    Brain mass 11/28/2023    Nonintractable generalized idiopathic epilepsy without status epilepticus ([address]) 11/28/2023    Seizures ([address]) 10/29/2023    Seizure ([address]) 09/08/2023    Encephalitis 09/08/2023    Abnormal brain MRI 09/08/2023    Primary hypertension 09/08/2023    Smoking 09/08/2023    Cannabis dependence ([address]) 09/08/2023    Alcohol use 09/08/2023    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 Lydia Angelucci who presents back to ED with facial twitching/concern for seizures.       Plan:  CT head wo contrast is stable but with persistent fluid collection overlying craniotomy site  -Dr. Lydia Angelucci to

## 2023-12-11 NOTE — PROGRESS NOTES
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 Presents w/ constant facial twitching consistent w/ epilepsia partialis continua      RECOMMENDATIONS / PLAN:  Okay to discontinue EEG  Neuro checks Q4H  Continue keppra 2000mg BID  Continue vimpat 100mg BID  Seizure precautions   Neurosurgery to see today for post op seroma   Will defer to neurosurgery if repeated MRI is needed - stable head CT, no reason from neurology standpoint to repeat  We will continue to follow   Okay from neurology standpoint to discharge home once seen by neurosurgery   F/u w/ neurology as outpatient for AED management     Management and plan discussed with:  Nursing staff  Dr. Mallory Dalton  Neurosurgery NP    Brittney Echols, MIN - CNP   NEUROLOGY  12/11/2023 10:27 AM  [facility]: [address] [facility]     Allergies Allergies   Allergen Reactions    Iodine Swelling     IV CONTRAST DYE      Diet ADULT DIET;  Regular   Isolation No active isolations     LABS   Metabolic Panel Recent Labs     12/10/23  1522      K 4.1      CO2 25   BUN 23*   CREATININE 1.0   GLUCOSE 103*   CALCIUM 8.9      CBC / Coags Recent Labs     12/10/23  1522 12/11/23  0701   WBC 17.0* 11.9*   RBC 4.57 4.58   HGB 14.1 14.3   HCT 41.7 41.6    312      Other Lab Results   Component Value Date/Time    LABA1C 4.8 11/29/2023 09:32 AM    LDLCALC 121 11/28/2023 06:37 AM    TRIG 121 11/28/2023 06:37 AM    [id number] 616 09/08/2023 04:38 PM    FOLATE 9.52

## 2023-12-11 NOTE — PLAN OF CARE
Problem: Pain  Goal: Verbalizes/displays adequate comfort level or baseline comfort level  Outcome: Progressing   No headaches reported overnight  Problem: Safety - Adult  Goal: Free from fall injury  Outcome: Progressing   Pt free from injury this shift and free of falls. 2/4 rails up on bed and bed is in the lowest position. Wheels locked and bed alarm set. Socks on pt and ID bands on pt. Call light in reach of pt and pt educated to call out to get up. Will continue to monitor for safety. Bedrest while EEG on.

## 2023-12-11 NOTE — PROGRESS NOTES
VSS. cEEG remains on. No calls from [facility]are thus far. No twitching facial movements noted by pt thus far this shift. Voids WNL to urinal while in bed. Tolerates pills whole. Crani site noted and is CDI with some swelling. Neuro WNL. Avasys on, seizure pads on. Bed alarm set. Call light in reach. Will continue to monitor.

## 2023-12-11 NOTE — PLAN OF CARE
Problem: Safety - Adult  Goal: Free from fall injury  12/11/2023 0727 by Jim Chacon RN  Outcome: Progressing  12/11/2023 0418 by José Schultz RN  Outcome: Progressing   All fall precautions in place. Bed locked and in lowest position with alarm on. Overbed table and personal belonings within reach. Call light within reach and patient instructed to use call light for assistance. Non-skid socks on. Problem: Discharge Planning  Goal: Discharge to home or other facility with appropriate resources  Outcome: Progressing   Pt involved in discharge planning. Barriers to discharge discussed with pt. Discharge learning needs identified. Discussed with pt any additional needed resources and transportation plans.

## 2023-12-12 ENCOUNTER — APPOINTMENT (OUTPATIENT)
Dept: GENERAL RADIOLOGY | Age: 53
DRG: 101 | End: 2023-12-12
Payer: COMMERCIAL

## 2023-12-12 VITALS
HEART RATE: 85 BPM | OXYGEN SATURATION: 95 % | TEMPERATURE: 97.8 F | WEIGHT: 184 LBS | BODY MASS INDEX: 26.34 KG/M2 | DIASTOLIC BLOOD PRESSURE: 87 MMHG | RESPIRATION RATE: 16 BRPM | SYSTOLIC BLOOD PRESSURE: 133 MMHG | HEIGHT: 70 IN

## 2023-12-12 LAB
LEVETIRACETAM SERPL-MCNC: 47.8 UG/ML (ref 6–46)
MEDICATION DOSE-MCNC: ABNORMAL

## 2023-12-12 PROCEDURE — 99024 POSTOP FOLLOW-UP VISIT: CPT | Performed by: NURSE PRACTITIONER

## 2023-12-12 PROCEDURE — 2580000003 HC RX 258: Performed by: INTERNAL MEDICINE

## 2023-12-12 PROCEDURE — 6360000002 HC RX W HCPCS: Performed by: INTERNAL MEDICINE

## 2023-12-12 PROCEDURE — 6370000000 HC RX 637 (ALT 250 FOR IP): Performed by: INTERNAL MEDICINE

## 2023-12-12 PROCEDURE — 6370000000 HC RX 637 (ALT 250 FOR IP): Performed by: NURSE PRACTITIONER

## 2023-12-12 PROCEDURE — 71046 X-RAY EXAM CHEST 2 VIEWS: CPT

## 2023-12-12 RX ORDER — LACOSAMIDE 100 MG/1
100 TABLET ORAL 2 TIMES DAILY
Qty: 180 TABLET | Refills: 1 | Status: SHIPPED | OUTPATIENT
Start: 2023-12-12 | End: 2024-06-09

## 2023-12-12 RX ORDER — OXYCODONE HYDROCHLORIDE 5 MG/1
5 TABLET ORAL EVERY 6 HOURS PRN
Qty: 12 TABLET | Refills: 0 | Status: SHIPPED | OUTPATIENT
Start: 2023-12-12 | End: 2023-12-15

## 2023-12-12 RX ORDER — LEVETIRACETAM 1000 MG/1
2000 TABLET ORAL 2 TIMES DAILY
Qty: 60 TABLET | Refills: 3 | Status: SHIPPED | OUTPATIENT
Start: 2023-12-12

## 2023-12-12 RX ORDER — METHOCARBAMOL 750 MG/1
750 TABLET, FILM COATED ORAL EVERY 6 HOURS PRN
Qty: 40 TABLET | Refills: 0 | Status: SHIPPED | OUTPATIENT
Start: 2023-12-12 | End: 2023-12-22

## 2023-12-12 RX ADMIN — NIFEDIPINE 30 MG: 30 TABLET, EXTENDED RELEASE ORAL at 09:00

## 2023-12-12 RX ADMIN — Medication 400 MG: at 09:12

## 2023-12-12 RX ADMIN — NYSTATIN 500000 UNITS: 100000 SUSPENSION ORAL at 09:11

## 2023-12-12 RX ADMIN — ENOXAPARIN SODIUM 40 MG: 100 INJECTION SUBCUTANEOUS at 09:12

## 2023-12-12 RX ADMIN — SODIUM CHLORIDE, PRESERVATIVE FREE 5 ML: 5 INJECTION INTRAVENOUS at 09:11

## 2023-12-12 RX ADMIN — THIAMINE HCL TAB 100 MG 100 MG: 100 TAB at 09:12

## 2023-12-12 RX ADMIN — LACOSAMIDE 100 MG: 50 TABLET, FILM COATED ORAL at 09:12

## 2023-12-12 RX ADMIN — LEVETIRACETAM 2000 MG: 750 TABLET, FILM COATED ORAL at 09:12

## 2023-12-12 RX ADMIN — PANTOPRAZOLE SODIUM 40 MG: 40 TABLET, DELAYED RELEASE ORAL at 06:14

## 2023-12-12 ASSESSMENT — PAIN SCALES - GENERAL: PAINLEVEL_OUTOF10: 0

## 2023-12-12 NOTE — PLAN OF CARE
Patient alert and oriented to self,place,time and situation no seizure activity noted at time of report. Patient has received his anti-seizure medications as per [facility]. Patient remains free from falls or injury.

## 2023-12-12 NOTE — PLAN OF CARE
Problem: Discharge Planning  Goal: Discharge to home or other facility with appropriate resources  12/12/2023 1025 by Martha Nathan RN  Outcome: Progressing   Patient is progressing for discharge. Problem: Pain  Goal: Verbalizes/displays adequate comfort level or baseline comfort level  12/12/2023 1025 by Martha Nathan RN  Outcome: Progressing   Patients pain medications are being monitored by the [facility]. Problem: Cognitive:  Goal: Knowledge of wound care  Description: Knowledge of wound care  Outcome: Progressing   Patient is knowledgeable of wound care.

## 2023-12-12 NOTE — DISCHARGE SUMMARY
V2.0  Discharge Summary    Name:  Smith Abrams /Age/Sex: 1970 (47 y.o. male)   Admit Date: 12/10/2023  Discharge Date: 23    MRN & CSN:  [id number] & [id number] Encounter Date and Time 23 10:51 AM EST    Attending:  Kaylah Perera MD Discharging Provider: MIN Lentz [facility] Course:     Brief HPI: Smith Abrams is a 47 y.o. male with a past medical history of seizure, hypertension, recent craniotomy for glioma resection on  who presented to the emergency room on December 10 with left facial twitching, headache and slurring of speech. In the emergency room CT of the head demonstrated postoperative changes from a recent craniotomy. Patient was given Versed in the emergency room and his Keppra dose was increased and he was started on Vimpat. Brief Problem Based Course:   Focal status epilepticus-neurology was consulted. Recommended Keppra 2000 mg twice daily, Vimpat 100 mg twice daily  Postop seroma-neurosurgery consulted. No need for intervention. Patient will have staples removed at his already scheduled appointment  Cough-chest x-ray was ordered which was negative for acute process. Recommended incentive spirometer      The patient expressed appropriate understanding of, and agreement with the discharge recommendations, medications, and plan. Consults this admission:  IP CONSULT TO NEUROLOGY  IP CONSULT TO NEUROSURGERY    Discharge Diagnosis:   Focal seizures [facility])        Discharge Instruction:   Follow up appointments: Follow-up with neurosurgery as scheduled.   Follow-up with neurology in 2 weeks  Primary care physician: Mary Jones within 2 weeks  Diet: regular diet   Activity: activity as tolerated  Disposition: Discharged to:   [x]Home, []C, []SNF, []Acute Rehab, []Hospice   Condition on discharge: Stable  Labs and Tests to be Followed up as an outpatient by PCP or Specialist:     Discharge Medications:        Medication List right frontotemporal craniotomy. There is a large right frontotemporal scalp hypodense collection with areas of amorphous hyperdensity, likely seroma/aging hematoma. Overlying skin staples. There is a surgical cavity in the right  temporal lobe which is limited assessment by CT but appears similar to the prior study aside from resolution of pneumocephalus. Minimal residual right-to-left midline shift and compression of the right lateral ventricle is approximately 3 mm right-to-left midline shift at the septum pellucidum, previously 4 mm. No definite new hemorrhage or mass effect. No acute calvarial abnormalities. No orbital abnormality. There complete opacification of the right ethmoid sinus. Mucosal thickening in the right frontal and maxillary sinuses. Postoperative changes of right frontotemporal craniotomy with stable appearance of the surgical cavity in the right temporal lobe. Mild residual, slightly decreased midline shift, now 3 mm at the septum pellucidum. Large scalp fluid collection overlying the craniotomy, likely seroma/aging hematoma. Electronically signed by Ivis Simpson MD      CBC:   Recent Labs     12/10/23  1522 12/11/23  0701   WBC 17.0* 11.9*   HGB 14.1 14.3    312     BMP:    Recent Labs     12/10/23  1522      K 4.1      CO2 25   BUN 23*   CREATININE 1.0   GLUCOSE 103*     Hepatic: No results for input(s): \"AST\", \"ALT\", \"ALB\", \"BILITOT\", \"ALKPHOS\" in the last 72 hours. Lipids:   Lab Results   Component Value Date/Time    CHOL 172 11/28/2023 06:37 AM    HDL 27 11/28/2023 06:37 AM    TRIG 121 11/28/2023 06:37 AM     Hemoglobin A1C:   Lab Results   Component Value Date/Time    LABA1C 4.8 11/29/2023 09:32 AM     TSH: No results found for: \"TSH\"  Troponin: No results found for: \"TROPONINT\"  Lactic Acid: No results for input(s): \"LACTA\" in the last 72 hours. BNP: No results for input(s): \"PROBNP\" in the last 72 hours.   UA:  Lab Results   Component Value Date/Time

## 2023-12-12 NOTE — PROGRESS NOTES
This RN went over discharge paperwork with patient and his spouse, patient was taken down by the PCA via wheelchair, all of the patients LDA's were removed.

## 2023-12-12 NOTE — PROGRESS NOTES
Patient is alert and orientX4, VSS, patient is voiding adequately per BRP, patient is tolerating a reg diet, patient is standby assist, patient went down to xray- came back went to bed, patient will be discharged to home.

## 2023-12-13 ENCOUNTER — TRANSCRIBE ORDERS (OUTPATIENT)
Dept: ADMINISTRATIVE | Age: 53
End: 2023-12-13

## 2023-12-13 DIAGNOSIS — C72.9 MALIGNANT NEOPLASM OF NERVOUS SYSTEM (HCC): Primary | ICD-10-CM

## 2023-12-13 LAB
Lab: NORMAL
REPORT: NORMAL
THIS TEST SENT TO: NORMAL

## 2023-12-15 LAB
Lab: NORMAL
REPORT: NORMAL
THIS TEST SENT TO: NORMAL

## 2023-12-15 NOTE — PROGRESS NOTES
Physician Progress Note      Armand PERRIN #:                  [id number]  :                       1970  ADMIT DATE:       12/10/2023 2:47 PM  [address] [place] [address] DATE:        2023 11:38 AM  RESPONDING  PROVIDER #:        Julio Adames MD          QUERY TEXT:    Patient admitted with seizure, noted to have compression of the right lateral   ventricle in CT head on 12/10. If possible, please document in progress notes   and discharge summary if you are evaluating and/or treating any of the   following: The medical record reflects the following:  Risk Factors: h/o craniotomy and post op seroma  Clinical Indicators:  CT of the head on 12/10 - Minimal residual right-to-left midline shift and   compression of the right lateral ventricle is approximately 3 mm right-to-left   midline shift at the septum pellucidum, previously 4 mm. No definite new   hemorrhage or mass effect.  seizure - ed report on 12/10  12/12 d/c summary, \"Postop seroma-neurosurgery   consulted. No need for intervention. Patient will have staples removed at   his already scheduled appointment. \"  post op RIGHT TEMPORAL CRANIOTOMY FOR RESECTION OF BRAIN MASS on consult on   12/10  Treatment : Keppra iv, CT Head, Vimpat, neurosurgery consult, EEG  Options provided:  -- Brain compression  -- Other - I will add my own diagnosis  -- Disagree - Not applicable / Not valid  -- Disagree - Clinically unable to determine / Unknown  -- Refer to Clinical Documentation Reviewer    PROVIDER RESPONSE TEXT:    This patient has brain compression. Query created by: Juan Diego Anthony on 2023 10:24 AM      QUERY TEXT:    Pt admitted with focal status seizure and underwent right temporal glioma s/p   resection 2023 in h&p on 12/10.? If possible, please document in   progress notes and discharge summary the relationship if any between the focal   status seizure and the surgery:     The medical record reflects the following:  Risk Factors: right temporal glioma s/p resection on h&p, ct shows compression   on 12/10  Clinical Indicators: focal status seizure in H&P on 12/10  Right temporal glioma s/p resection on 11/30/23 and in H&P on 12/10  CT Head: IMPRESSION: Postoperative changes of right frontotemporal craniotomy   with stable appearance of the surgical cavity in the right temporal lobe. Mild   residual, slightly decreased midline shift, now 3 mm at the septum   pellucidum. Large scalp fluid collection overlying the craniotomy, likely seroma/aging   hematoma. Treatment: iv keppra, Vimpat, CT head, Neurosurgery consult, EEG  Options provided:  -- Focal status seizure is due to the procedure: Patient has focal status   seizure due to the procedure. -- Focal status seizure is not due to the procedure: Focal status seizure is   not due to the procedure  -- Other - I will add my own diagnosis  -- Disagree - Not applicable / Not valid  -- Disagree - Clinically unable to determine / Unknown  -- Refer to Clinical Documentation Reviewer    PROVIDER RESPONSE TEXT:    Focal status seizure is not due to the procedure:  Focal status seizure is not   due to the procedure    Query created by: Patrice Tyler on 12/14/2023 10:28 AM      Electronically signed by:  Clifford Adair MD 12/15/2023 4:04 PM

## 2023-12-31 ENCOUNTER — HOSPITAL ENCOUNTER (OUTPATIENT)
Dept: MRI IMAGING | Age: 53
Discharge: HOME | End: 2023-12-31
Attending: RADIOLOGY
Payer: COMMERCIAL

## 2023-12-31 DIAGNOSIS — C72.9 MALIGNANT NEOPLASM OF NERVOUS SYSTEM (HCC): ICD-10-CM

## 2023-12-31 PROCEDURE — 6360000004 HC RX CONTRAST MEDICATION: Performed by: RADIOLOGY

## 2023-12-31 PROCEDURE — A9576 INJ PROHANCE MULTIPACK: HCPCS | Performed by: RADIOLOGY

## 2023-12-31 PROCEDURE — 70553 MRI BRAIN STEM W/O & W/DYE: CPT

## 2023-12-31 RX ADMIN — GADOTERIDOL 18 ML: 279.3 INJECTION, SOLUTION INTRAVENOUS at 10:24

## 2024-01-03 LAB
Lab: NORMAL
REPORT: NORMAL
THIS TEST SENT TO: NORMAL

## 2024-03-28 ENCOUNTER — HOSPITAL ENCOUNTER (OUTPATIENT)
Dept: MRI IMAGING | Age: 54
Discharge: HOME | End: 2024-03-28
Attending: RADIOLOGY
Payer: COMMERCIAL

## 2024-03-28 DIAGNOSIS — C71.2 GLIOBLASTOMA OF TEMPORAL LOBE (HCC): ICD-10-CM

## 2024-03-28 PROCEDURE — 6360000004 HC RX CONTRAST MEDICATION: Performed by: RADIOLOGY

## 2024-03-28 PROCEDURE — A9576 INJ PROHANCE MULTIPACK: HCPCS | Performed by: RADIOLOGY

## 2024-03-28 PROCEDURE — 70553 MRI BRAIN STEM W/O & W/DYE: CPT

## 2024-03-28 RX ADMIN — GADOTERIDOL 18 ML: 279.3 INJECTION, SOLUTION INTRAVENOUS at 09:04

## 2024-04-01 ENCOUNTER — HOSPITAL ENCOUNTER (INPATIENT)
Age: 54
LOS: 3 days | Discharge: HOME | DRG: 054 | End: 2024-04-04
Attending: EMERGENCY MEDICINE | Admitting: INTERNAL MEDICINE
Payer: COMMERCIAL

## 2024-04-01 ENCOUNTER — APPOINTMENT (OUTPATIENT)
Dept: CT IMAGING | Age: 54
DRG: 054 | End: 2024-04-01
Payer: COMMERCIAL

## 2024-04-01 ENCOUNTER — APPOINTMENT (OUTPATIENT)
Dept: GENERAL RADIOLOGY | Age: 54
DRG: 054 | End: 2024-04-01
Payer: COMMERCIAL

## 2024-04-01 DIAGNOSIS — R56.9 SEIZURE (HCC): Primary | ICD-10-CM

## 2024-04-01 DIAGNOSIS — G40.919 BREAKTHROUGH SEIZURE (HCC): ICD-10-CM

## 2024-04-01 DIAGNOSIS — G93.89 MASS OF BRAIN: ICD-10-CM

## 2024-04-01 LAB
ALBUMIN SERPL-MCNC: 4.4 G/DL (ref 3.4–5)
ALBUMIN/GLOB SERPL: 1.8 {RATIO} (ref 1.1–2.2)
ALP SERPL-CCNC: 94 U/L (ref 40–129)
ALT SERPL-CCNC: 18 U/L (ref 10–40)
ANION GAP SERPL CALCULATED.3IONS-SCNC: 21 MMOL/L (ref 3–16)
AST SERPL-CCNC: 14 U/L (ref 15–37)
BASOPHILS # BLD: 0.1 K/UL (ref 0–0.2)
BASOPHILS NFR BLD: 1.2 %
BILIRUB SERPL-MCNC: 1.1 MG/DL (ref 0–1)
BUN SERPL-MCNC: 9 MG/DL (ref 7–20)
CALCIUM SERPL-MCNC: 9.3 MG/DL (ref 8.3–10.6)
CHLORIDE SERPL-SCNC: 102 MMOL/L (ref 99–110)
CO2 SERPL-SCNC: 15 MMOL/L (ref 21–32)
CREAT SERPL-MCNC: 1.3 MG/DL (ref 0.9–1.3)
DEPRECATED RDW RBC AUTO: 14.3 % (ref 12.4–15.4)
EKG ATRIAL RATE: 107 BPM
EKG DIAGNOSIS: NORMAL
EKG P AXIS: 68 DEGREES
EKG P-R INTERVAL: 130 MS
EKG Q-T INTERVAL: 330 MS
EKG QRS DURATION: 88 MS
EKG QTC CALCULATION (BAZETT): 440 MS
EKG R AXIS: 75 DEGREES
EKG T AXIS: 74 DEGREES
EKG VENTRICULAR RATE: 107 BPM
EOSINOPHIL # BLD: 0.1 K/UL (ref 0–0.6)
EOSINOPHIL NFR BLD: 2.3 %
GFR SERPLBLD CREATININE-BSD FMLA CKD-EPI: 66 ML/MIN/{1.73_M2}
GLUCOSE BLD-MCNC: 124 MG/DL (ref 70–99)
GLUCOSE SERPL-MCNC: 123 MG/DL (ref 70–99)
HCT VFR BLD AUTO: 44.5 % (ref 40.5–52.5)
HGB BLD-MCNC: 15.7 G/DL (ref 13.5–17.5)
INR PPP: 1.1 (ref 0.84–1.16)
LACTATE BLDV-SCNC: 1.1 MMOL/L (ref 0.4–2)
LYMPHOCYTES # BLD: 0.3 K/UL (ref 1–5.1)
LYMPHOCYTES NFR BLD: 6 %
MCH RBC QN AUTO: 32.7 PG (ref 26–34)
MCHC RBC AUTO-ENTMCNC: 35.2 G/DL (ref 31–36)
MCV RBC AUTO: 93 FL (ref 80–100)
MONOCYTES # BLD: 0.4 K/UL (ref 0–1.3)
MONOCYTES NFR BLD: 8.5 %
NEUTROPHILS # BLD: 4.1 K/UL (ref 1.7–7.7)
NEUTROPHILS NFR BLD: 82 %
PERFORMED ON: ABNORMAL
PLATELET # BLD AUTO: 207 K/UL (ref 135–450)
PMV BLD AUTO: 6.7 FL (ref 5–10.5)
POTASSIUM SERPL-SCNC: 3.6 MMOL/L (ref 3.5–5.1)
PROT SERPL-MCNC: 6.8 G/DL (ref 6.4–8.2)
PROTHROMBIN TIME: 14.2 SEC (ref 11.5–14.8)
RBC # BLD AUTO: 4.79 M/UL (ref 4.2–5.9)
SODIUM SERPL-SCNC: 138 MMOL/L (ref 136–145)
TROPONIN, HIGH SENSITIVITY: 16 NG/L (ref 0–22)
WBC # BLD AUTO: 5 K/UL (ref 4–11)

## 2024-04-01 PROCEDURE — 6370000000 HC RX 637 (ALT 250 FOR IP): Performed by: NURSE PRACTITIONER

## 2024-04-01 PROCEDURE — 6360000004 HC RX CONTRAST MEDICATION: Performed by: STUDENT IN AN ORGANIZED HEALTH CARE EDUCATION/TRAINING PROGRAM

## 2024-04-01 PROCEDURE — 99285 EMERGENCY DEPT VISIT HI MDM: CPT

## 2024-04-01 PROCEDURE — 70496 CT ANGIOGRAPHY HEAD: CPT

## 2024-04-01 PROCEDURE — 96375 TX/PRO/DX INJ NEW DRUG ADDON: CPT

## 2024-04-01 PROCEDURE — 96374 THER/PROPH/DIAG INJ IV PUSH: CPT

## 2024-04-01 PROCEDURE — 84484 ASSAY OF TROPONIN QUANT: CPT

## 2024-04-01 PROCEDURE — 70450 CT HEAD/BRAIN W/O DYE: CPT

## 2024-04-01 PROCEDURE — 2500000003 HC RX 250 WO HCPCS: Performed by: STUDENT IN AN ORGANIZED HEALTH CARE EDUCATION/TRAINING PROGRAM

## 2024-04-01 PROCEDURE — 83605 ASSAY OF LACTIC ACID: CPT

## 2024-04-01 PROCEDURE — 80053 COMPREHEN METABOLIC PANEL: CPT

## 2024-04-01 PROCEDURE — 1200000000 HC SEMI PRIVATE

## 2024-04-01 PROCEDURE — 36415 COLL VENOUS BLD VENIPUNCTURE: CPT

## 2024-04-01 PROCEDURE — 85025 COMPLETE CBC W/AUTO DIFF WBC: CPT

## 2024-04-01 PROCEDURE — 6370000000 HC RX 637 (ALT 250 FOR IP): Performed by: INTERNAL MEDICINE

## 2024-04-01 PROCEDURE — 6360000002 HC RX W HCPCS: Performed by: INTERNAL MEDICINE

## 2024-04-01 PROCEDURE — 6360000002 HC RX W HCPCS: Performed by: STUDENT IN AN ORGANIZED HEALTH CARE EDUCATION/TRAINING PROGRAM

## 2024-04-01 PROCEDURE — A4216 STERILE WATER/SALINE, 10 ML: HCPCS | Performed by: STUDENT IN AN ORGANIZED HEALTH CARE EDUCATION/TRAINING PROGRAM

## 2024-04-01 PROCEDURE — 85610 PROTHROMBIN TIME: CPT

## 2024-04-01 PROCEDURE — 71045 X-RAY EXAM CHEST 1 VIEW: CPT

## 2024-04-01 PROCEDURE — 2580000003 HC RX 258: Performed by: STUDENT IN AN ORGANIZED HEALTH CARE EDUCATION/TRAINING PROGRAM

## 2024-04-01 PROCEDURE — 93005 ELECTROCARDIOGRAM TRACING: CPT | Performed by: STUDENT IN AN ORGANIZED HEALTH CARE EDUCATION/TRAINING PROGRAM

## 2024-04-01 RX ORDER — SODIUM CHLORIDE 9 MG/ML
INJECTION, SOLUTION INTRAVENOUS PRN
Status: DISCONTINUED | OUTPATIENT
Start: 2024-04-01 | End: 2024-04-04 | Stop reason: HOSPADM

## 2024-04-01 RX ORDER — SODIUM CHLORIDE 0.9 % (FLUSH) 0.9 %
5-40 SYRINGE (ML) INJECTION PRN
Status: DISCONTINUED | OUTPATIENT
Start: 2024-04-01 | End: 2024-04-04 | Stop reason: HOSPADM

## 2024-04-01 RX ORDER — MAGNESIUM SULFATE IN WATER 40 MG/ML
2000 INJECTION, SOLUTION INTRAVENOUS PRN
Status: DISCONTINUED | OUTPATIENT
Start: 2024-04-01 | End: 2024-04-04 | Stop reason: HOSPADM

## 2024-04-01 RX ORDER — ACETAMINOPHEN 650 MG/1
650 SUPPOSITORY RECTAL EVERY 6 HOURS PRN
Status: DISCONTINUED | OUTPATIENT
Start: 2024-04-01 | End: 2024-04-04 | Stop reason: HOSPADM

## 2024-04-01 RX ORDER — POLYETHYLENE GLYCOL 3350 17 G/17G
17 POWDER, FOR SOLUTION ORAL DAILY PRN
Status: DISCONTINUED | OUTPATIENT
Start: 2024-04-01 | End: 2024-04-04 | Stop reason: HOSPADM

## 2024-04-01 RX ORDER — NIFEDIPINE 30 MG
30 TABLET, EXTENDED RELEASE ORAL DAILY
Status: DISCONTINUED | OUTPATIENT
Start: 2024-04-02 | End: 2024-04-04 | Stop reason: HOSPADM

## 2024-04-01 RX ORDER — ACETAMINOPHEN 325 MG/1
650 TABLET ORAL EVERY 6 HOURS PRN
Status: DISCONTINUED | OUTPATIENT
Start: 2024-04-01 | End: 2024-04-04 | Stop reason: HOSPADM

## 2024-04-01 RX ORDER — SODIUM CHLORIDE 0.9 % (FLUSH) 0.9 %
5-40 SYRINGE (ML) INJECTION EVERY 12 HOURS SCHEDULED
Status: DISCONTINUED | OUTPATIENT
Start: 2024-04-01 | End: 2024-04-04 | Stop reason: HOSPADM

## 2024-04-01 RX ORDER — POTASSIUM CHLORIDE 7.45 MG/ML
10 INJECTION INTRAVENOUS PRN
Status: DISCONTINUED | OUTPATIENT
Start: 2024-04-01 | End: 2024-04-04 | Stop reason: HOSPADM

## 2024-04-01 RX ORDER — DEXAMETHASONE SODIUM PHOSPHATE 4 MG/ML
4 INJECTION, SOLUTION INTRA-ARTICULAR; INTRALESIONAL; INTRAMUSCULAR; INTRAVENOUS; SOFT TISSUE EVERY 6 HOURS
Status: DISCONTINUED | OUTPATIENT
Start: 2024-04-01 | End: 2024-04-04 | Stop reason: HOSPADM

## 2024-04-01 RX ORDER — ONDANSETRON 4 MG/1
4 TABLET, ORALLY DISINTEGRATING ORAL EVERY 8 HOURS PRN
Status: DISCONTINUED | OUTPATIENT
Start: 2024-04-01 | End: 2024-04-04 | Stop reason: HOSPADM

## 2024-04-01 RX ORDER — MECOBALAMIN 5000 MCG
5 TABLET,DISINTEGRATING ORAL NIGHTLY PRN
Status: DISCONTINUED | OUTPATIENT
Start: 2024-04-01 | End: 2024-04-04 | Stop reason: HOSPADM

## 2024-04-01 RX ORDER — LORAZEPAM 2 MG/ML
2 INJECTION INTRAMUSCULAR EVERY 6 HOURS PRN
Status: DISCONTINUED | OUTPATIENT
Start: 2024-04-01 | End: 2024-04-04 | Stop reason: HOSPADM

## 2024-04-01 RX ORDER — LACOSAMIDE 50 MG/1
100 TABLET ORAL 2 TIMES DAILY
Status: DISCONTINUED | OUTPATIENT
Start: 2024-04-01 | End: 2024-04-02

## 2024-04-01 RX ORDER — DEXAMETHASONE SODIUM PHOSPHATE 10 MG/ML
10 INJECTION, SOLUTION INTRAMUSCULAR; INTRAVENOUS ONCE
Status: COMPLETED | OUTPATIENT
Start: 2024-04-01 | End: 2024-04-01

## 2024-04-01 RX ORDER — ONDANSETRON 2 MG/ML
4 INJECTION INTRAMUSCULAR; INTRAVENOUS EVERY 6 HOURS PRN
Status: DISCONTINUED | OUTPATIENT
Start: 2024-04-01 | End: 2024-04-04 | Stop reason: HOSPADM

## 2024-04-01 RX ORDER — DIPHENHYDRAMINE HYDROCHLORIDE 50 MG/ML
50 INJECTION INTRAMUSCULAR; INTRAVENOUS ONCE
Status: COMPLETED | OUTPATIENT
Start: 2024-04-01 | End: 2024-04-01

## 2024-04-01 RX ORDER — PANTOPRAZOLE SODIUM 40 MG/1
40 TABLET, DELAYED RELEASE ORAL
Status: DISCONTINUED | OUTPATIENT
Start: 2024-04-02 | End: 2024-04-04 | Stop reason: HOSPADM

## 2024-04-01 RX ORDER — POTASSIUM CHLORIDE 20 MEQ/1
40 TABLET, EXTENDED RELEASE ORAL PRN
Status: DISCONTINUED | OUTPATIENT
Start: 2024-04-01 | End: 2024-04-04 | Stop reason: HOSPADM

## 2024-04-01 RX ADMIN — HYDRALAZINE HYDROCHLORIDE 75 MG: 50 TABLET ORAL at 20:25

## 2024-04-01 RX ADMIN — Medication 5 MG: at 21:29

## 2024-04-01 RX ADMIN — LACOSAMIDE 100 MG: 50 TABLET, FILM COATED ORAL at 20:25

## 2024-04-01 RX ADMIN — FAMOTIDINE 20 MG: 10 INJECTION, SOLUTION INTRAVENOUS at 13:19

## 2024-04-01 RX ADMIN — DEXAMETHASONE SODIUM PHOSPHATE 10 MG: 10 INJECTION INTRAMUSCULAR; INTRAVENOUS at 14:28

## 2024-04-01 RX ADMIN — IOPAMIDOL 75 ML: 755 INJECTION, SOLUTION INTRAVENOUS at 13:26

## 2024-04-01 RX ADMIN — LEVETIRACETAM 2000 MG: 500 TABLET, FILM COATED ORAL at 20:25

## 2024-04-01 RX ADMIN — DIPHENHYDRAMINE HYDROCHLORIDE 50 MG: 50 INJECTION INTRAMUSCULAR; INTRAVENOUS at 13:21

## 2024-04-01 RX ADMIN — DEXAMETHASONE SODIUM PHOSPHATE 4 MG: 4 INJECTION, SOLUTION INTRAMUSCULAR; INTRAVENOUS at 20:25

## 2024-04-01 NOTE — ED NOTES
ED TO INPATIENT SBAR HANDOFF    Patient Name: Erwin Doss   :  1970  54 y.o.   MRN:  [id number]  Preferred Name    ED Room #:  B22/B22-22  Family/Caregiver Present yes  Restraints no   Sitter no   Sepsis Risk Score Sepsis Risk Score: 1.02    Situation  Code Status: Prior No additional code details.    Allergies: Iodine  Weight: Patient Vitals for the past 96 hrs (Last 3 readings):   Weight   24 1302 81.7 kg (180 lb 3.2 oz)     Arrived from: home  Chief Complaint:   Chief Complaint   Patient presents with    Seizures     Ems was called for seizure, pt reportedly had focal seizure and fell forward into bath tub. Had \"full body\" seizure witnessed by ems about a minute and a half. Given 10mg versed IV. Pt alert and oriented after both seizures.     Hospital Problem/Diagnosis:  Principal Problem:    New onset seizure (HCC)  Resolved Problems:    * No resolved hospital problems. *    Imaging:   XR CHEST PORTABLE   Final Result   1.  No acute cardiopulmonary findings.      CTA HEAD NECK W CONTRAST   Final Result   1. No evidence of any intracranial large vessel occlusion.   2. No evidence of any neck large vessel occlusion, stenosis or dissection.   3. Patient is status post a right temporal craniotomy and there are changes of   encephalomalacia with abnormal peripheral enhancement within the right temporal   lobe. Correlate with MRI head report dated 3/28/2024.      CT HEAD WO CONTRAST   Final Result   1. Redemonstration of postsurgical changes from prior tumor resection in the   temporal lobe. There is vasogenic edema adjacent to the resection cavity, which   remains suspicious for recurrent/progressive tumor.   2. No evidence of acute intracranial hemorrhage or mass effect otherwise.           Abnormal labs:   Abnormal Labs Reviewed   CBC WITH AUTO DIFFERENTIAL - Abnormal; Notable for the following components:       Result Value    Lymphocytes Absolute 0.3 (*)     All other components within normal limits

## 2024-04-01 NOTE — H&P
mastoid effusion. Marrow signal and bones: No marrow signal abnormality. Other findings: No additional findings.     Progressive areas of marginal enhancement about the surgical cavity, with progression of multifocal other amorphous areas throughout the right cerebral hemisphere, right splenium corpus callosum, and right midbrain, highly concerning for progressive high-grade tumor. No significant change in underlying infiltrative FLAIR signal abnormality suggesting nonenhancing tumor. Electronically signed by Jayden Nguyen MD        Electronically signed by Jada Suh MD on 4/1/2024 at 4:09 PM

## 2024-04-01 NOTE — ED PROVIDER NOTES
THE [facility]  EMERGENCY DEPARTMENT ENCOUNTER          EM RESIDENT NOTE       Date of evaluation: 4/1/2024    Chief Complaint     Seizures (Ems was called for seizure, pt reportedly had focal seizure and fell forward into bath tub. Had \"full body\" seizure witnessed by ems about a minute and a half. Given 10mg versed IV. Pt alert and oriented after both seizures.)      History of Present Illness     Erwin Doss is a 54 y.o. male who is approximately 5 months status post right craniotomy secondary to high-grade glioma who presents to the emergency department for evaluation of seizure like activity.    Per EMS, the patient experienced a generalized seizure that lasted approximately 5 minutes while he was in the bathroom on the toilet.  The patient was placed in the tub where EMS found him to be postictal.  In route, the patient returned to baseline however experienced another seizure in which she was given 10 mg of IV Versed with resolution of his seizure activity.  On arrival to the emergency department, the patient is noted to have left-sided hemiparesis and left-sided facial droop, which is reportedly new.  Patient is otherwise unable to provide any further history.    ROS:  Unable to obtain secondary to altered mental status    Past Medical, Surgical, Family, and Social History     He has a past medical history of Hypertension and Seizure (HCC).  He has a past surgical history that includes craniotomy (Right, 11/30/2023).  His family history is not on file.  He reports that he has been smoking cigarettes. His smokeless tobacco use includes snuff. He reports current alcohol use of about 6.0 standard drinks of alcohol per week. He reports that he does not use drugs.    Medications     Current Discharge Medication List        CONTINUE these medications which have NOT CHANGED    Details   lacosamide (VIMPAT) 100 MG TABS tablet Take 1 tablet by mouth 2 times daily for 180 days. Max Daily Amount: 200 mg  Qty:

## 2024-04-01 NOTE — PLAN OF CARE
Neurosurgery Plan of care     PEPE CHRISTIAN  [id number]   1970 4/1/2024    Requesting physician: No admitting provider for patient encounter.    Reason for consultation: seizure, s/p craniotomy     Subjective:  Seizures (Ems was called for seizure, pt reportedly had focal seizure and fell forward into bath tub. Had \"full body\" seizure witnessed by ems about a minute and a half. Given 10mg versed IV. Pt alert and oriented after both seizures.)     Objective:  XR CHEST PORTABLE   Final Result   1.  No acute cardiopulmonary findings.      CTA HEAD NECK W CONTRAST   Final Result   1. No evidence of any intracranial large vessel occlusion.   2. No evidence of any neck large vessel occlusion, stenosis or dissection.   3. Patient is status post a right temporal craniotomy and there are changes of   encephalomalacia with abnormal peripheral enhancement within the right temporal   lobe. Correlate with MRI head report dated 3/28/2024.      CT HEAD WO CONTRAST   Final Result   1. Redemonstration of postsurgical changes from prior tumor resection in the   temporal lobe. There is vasogenic edema adjacent to the resection cavity, which   remains suspicious for recurrent/progressive tumor.   2. No evidence of acute intracranial hemorrhage or mass effect otherwise.             Assessment: Patient is a 54 y.o. 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 Unfortunately recent MRI shows diffuse tumor progression. Comes in today w/ seizure.        Plan:  Given diffuse progression into deep structures including thalamus there is no further surgical intervention recommended. Will need AED increased. Ok to start steroids for edema and acute seizure, would defer to rad once for outpatient management of steroid dosing.     Discussed and MRI reviewed with Dr. Mcnamara who agrees with above assessment and plan     Electronically signed by: MIN Chen - NP

## 2024-04-02 PROBLEM — G40.919 BREAKTHROUGH SEIZURE (HCC): Status: ACTIVE | Noted: 2024-04-02

## 2024-04-02 LAB
ANION GAP SERPL CALCULATED.3IONS-SCNC: 14 MMOL/L (ref 3–16)
BUN SERPL-MCNC: 13 MG/DL (ref 7–20)
CALCIUM SERPL-MCNC: 9.3 MG/DL (ref 8.3–10.6)
CHLORIDE SERPL-SCNC: 104 MMOL/L (ref 99–110)
CO2 SERPL-SCNC: 18 MMOL/L (ref 21–32)
CREAT SERPL-MCNC: 1 MG/DL (ref 0.9–1.3)
GFR SERPLBLD CREATININE-BSD FMLA CKD-EPI: 90 ML/MIN/{1.73_M2}
GLUCOSE BLD-MCNC: 132 MG/DL (ref 70–99)
GLUCOSE BLD-MCNC: 134 MG/DL (ref 70–99)
GLUCOSE BLD-MCNC: 146 MG/DL (ref 70–99)
GLUCOSE SERPL-MCNC: 126 MG/DL (ref 70–99)
PERFORMED ON: ABNORMAL
POTASSIUM SERPL-SCNC: 4.4 MMOL/L (ref 3.5–5.1)
SODIUM SERPL-SCNC: 136 MMOL/L (ref 136–145)

## 2024-04-02 PROCEDURE — 2580000003 HC RX 258: Performed by: INTERNAL MEDICINE

## 2024-04-02 PROCEDURE — 6370000000 HC RX 637 (ALT 250 FOR IP): Performed by: NURSE PRACTITIONER

## 2024-04-02 PROCEDURE — 6370000000 HC RX 637 (ALT 250 FOR IP)

## 2024-04-02 PROCEDURE — 6360000002 HC RX W HCPCS: Performed by: INTERNAL MEDICINE

## 2024-04-02 PROCEDURE — 99223 1ST HOSP IP/OBS HIGH 75: CPT | Performed by: PSYCHIATRY & NEUROLOGY

## 2024-04-02 PROCEDURE — 80048 BASIC METABOLIC PNL TOTAL CA: CPT

## 2024-04-02 PROCEDURE — 1200000000 HC SEMI PRIVATE

## 2024-04-02 PROCEDURE — 36415 COLL VENOUS BLD VENIPUNCTURE: CPT

## 2024-04-02 PROCEDURE — 6370000000 HC RX 637 (ALT 250 FOR IP): Performed by: INTERNAL MEDICINE

## 2024-04-02 RX ORDER — LACOSAMIDE 50 MG/1
200 TABLET ORAL 2 TIMES DAILY
Status: DISCONTINUED | OUTPATIENT
Start: 2024-04-02 | End: 2024-04-04 | Stop reason: HOSPADM

## 2024-04-02 RX ADMIN — SODIUM CHLORIDE, PRESERVATIVE FREE 10 ML: 5 INJECTION INTRAVENOUS at 09:05

## 2024-04-02 RX ADMIN — LEVETIRACETAM 2000 MG: 500 TABLET, FILM COATED ORAL at 08:45

## 2024-04-02 RX ADMIN — SODIUM CHLORIDE, PRESERVATIVE FREE 10 ML: 5 INJECTION INTRAVENOUS at 20:30

## 2024-04-02 RX ADMIN — NIFEDIPINE 30 MG: 30 TABLET, EXTENDED RELEASE ORAL at 08:45

## 2024-04-02 RX ADMIN — LEVETIRACETAM 2000 MG: 500 TABLET, FILM COATED ORAL at 20:29

## 2024-04-02 RX ADMIN — HYDRALAZINE HYDROCHLORIDE 75 MG: 50 TABLET ORAL at 15:47

## 2024-04-02 RX ADMIN — HYDRALAZINE HYDROCHLORIDE 75 MG: 50 TABLET ORAL at 20:29

## 2024-04-02 RX ADMIN — DEXAMETHASONE SODIUM PHOSPHATE 4 MG: 4 INJECTION, SOLUTION INTRAMUSCULAR; INTRAVENOUS at 04:02

## 2024-04-02 RX ADMIN — Medication 5 MG: at 20:30

## 2024-04-02 RX ADMIN — PANTOPRAZOLE SODIUM 40 MG: 40 TABLET, DELAYED RELEASE ORAL at 06:03

## 2024-04-02 RX ADMIN — DEXAMETHASONE SODIUM PHOSPHATE 4 MG: 4 INJECTION, SOLUTION INTRAMUSCULAR; INTRAVENOUS at 08:45

## 2024-04-02 RX ADMIN — LACOSAMIDE 200 MG: 50 TABLET, FILM COATED ORAL at 08:45

## 2024-04-02 RX ADMIN — DEXAMETHASONE SODIUM PHOSPHATE 4 MG: 4 INJECTION, SOLUTION INTRAMUSCULAR; INTRAVENOUS at 20:38

## 2024-04-02 RX ADMIN — LACOSAMIDE 200 MG: 50 TABLET, FILM COATED ORAL at 20:30

## 2024-04-02 RX ADMIN — DEXAMETHASONE SODIUM PHOSPHATE 4 MG: 4 INJECTION, SOLUTION INTRAMUSCULAR; INTRAVENOUS at 15:47

## 2024-04-02 RX ADMIN — HYDRALAZINE HYDROCHLORIDE 75 MG: 50 TABLET ORAL at 06:03

## 2024-04-02 NOTE — PLAN OF CARE
Problem: Safety - Adult  Goal: Free from fall injury  4/2/2024 1937 by Maurice Arellano, RN  Outcome: Progressing  Note: All fall precautions in place. Call light within reach.

## 2024-04-02 NOTE — PLAN OF CARE
Problem: Discharge Planning  Goal: Discharge to home or other facility with appropriate resources  Outcome: Progressing     Problem: ABCDS Injury Assessment  Goal: Absence of physical injury  4/2/2024 0715 by Fartun Kapoor, RN  Outcome: Progressing     Problem: Safety - Adult  Goal: Free from fall injury  4/2/2024 0715 by Fartun Kapoor, RN  Outcome: Progressing

## 2024-04-02 NOTE — CARE COORDINATION
Case Management Assessment  Initial Evaluation    Date/Time of Evaluation: 4/2/2024 1:04 PM  Assessment Completed by: KADEN CRAWFORD    If patient is discharged prior to next notation, then this note serves as note for discharge by case management.    Patient Name: Erwin Doss                   YOB: 1970  Diagnosis: Seizure (HCC) [R56.9]  Mass of brain [G93.89]  New onset seizure (HCC) [R56.9]                   Date / Time: 4/1/2024 12:52 PM    Patient Admission Status: Inpatient   Readmission Risk (Low < 19, Mod (19-27), High > 27): Readmission Risk Score: 15.4    Current PCP: Javier Chavez  PCP verified by CM? Yes    Chart Reviewed: Yes      History Provided by: Patient  Patient Orientation: Alert and Oriented    Patient Cognition: Alert    Hospitalization in the last 30 days (Readmission):  No    If yes, Readmission Assessment in CM Navigator will be completed.    Advance Directives:      Code Status: Full Code   Patient's Primary Decision Maker is: Named in Scanned ACP Document    Primary Decision Maker: Annette Singh - Crystal - [phone]    Discharge Planning:    Patient lives with: Spouse/Significant Other, Children Type of Home: House  Primary Care Giver: Self  Patient Support Systems include: Spouse/Significant Other, Children   Current Financial resources: None  Current community resources: None  Current services prior to admission: None            Current DME:              Type of Home Care services:  None    ADLS  Prior functional level: Independent in ADLs/IADLs  Current functional level: Assistance with the following:, Mobility    PT AM-PAC:   /24  OT AM-PAC:   /24    Family can provide assistance at DC: Yes  Would you like Case Management to discuss the discharge plan with any other family members/significant others, and if so, who? No  Plans to Return to Present Housing: Yes  Other Identified Issues/Barriers to RETURNING to current housing: na  Potential Assistance needed at

## 2024-04-02 NOTE — DISCHARGE INSTRUCTIONS
Do NOT engage in any activity where loss of consciousness would be dangerous to yourself or others (e.g. driving, climbing, swimming alone, caring for anyone unable to call 911 on their own, etc.) until approved by your neurologist. These restrictions may also limit your work for the time being if you work in a field where the above may be true.

## 2024-04-03 LAB
GLUCOSE BLD-MCNC: 118 MG/DL (ref 70–99)
GLUCOSE BLD-MCNC: 124 MG/DL (ref 70–99)
GLUCOSE BLD-MCNC: 133 MG/DL (ref 70–99)
GLUCOSE BLD-MCNC: 134 MG/DL (ref 70–99)
GLUCOSE BLD-MCNC: 150 MG/DL (ref 70–99)
PERFORMED ON: ABNORMAL

## 2024-04-03 PROCEDURE — 1200000000 HC SEMI PRIVATE

## 2024-04-03 PROCEDURE — 2580000003 HC RX 258: Performed by: INTERNAL MEDICINE

## 2024-04-03 PROCEDURE — 6360000002 HC RX W HCPCS: Performed by: INTERNAL MEDICINE

## 2024-04-03 PROCEDURE — 6370000000 HC RX 637 (ALT 250 FOR IP)

## 2024-04-03 PROCEDURE — 6370000000 HC RX 637 (ALT 250 FOR IP): Performed by: INTERNAL MEDICINE

## 2024-04-03 RX ADMIN — DEXAMETHASONE SODIUM PHOSPHATE 4 MG: 4 INJECTION, SOLUTION INTRAMUSCULAR; INTRAVENOUS at 14:14

## 2024-04-03 RX ADMIN — PANTOPRAZOLE SODIUM 40 MG: 40 TABLET, DELAYED RELEASE ORAL at 06:20

## 2024-04-03 RX ADMIN — DEXAMETHASONE SODIUM PHOSPHATE 4 MG: 4 INJECTION, SOLUTION INTRAMUSCULAR; INTRAVENOUS at 20:20

## 2024-04-03 RX ADMIN — DEXAMETHASONE SODIUM PHOSPHATE 4 MG: 4 INJECTION, SOLUTION INTRAMUSCULAR; INTRAVENOUS at 02:10

## 2024-04-03 RX ADMIN — HYDRALAZINE HYDROCHLORIDE 75 MG: 50 TABLET ORAL at 20:22

## 2024-04-03 RX ADMIN — LEVETIRACETAM 2000 MG: 500 TABLET, FILM COATED ORAL at 20:20

## 2024-04-03 RX ADMIN — SODIUM CHLORIDE, PRESERVATIVE FREE 10 ML: 5 INJECTION INTRAVENOUS at 11:09

## 2024-04-03 RX ADMIN — NIFEDIPINE 30 MG: 30 TABLET, EXTENDED RELEASE ORAL at 09:09

## 2024-04-03 RX ADMIN — HYDRALAZINE HYDROCHLORIDE 75 MG: 50 TABLET ORAL at 06:20

## 2024-04-03 RX ADMIN — LEVETIRACETAM 2000 MG: 500 TABLET, FILM COATED ORAL at 09:10

## 2024-04-03 RX ADMIN — SODIUM CHLORIDE, PRESERVATIVE FREE 10 ML: 5 INJECTION INTRAVENOUS at 22:13

## 2024-04-03 RX ADMIN — DEXAMETHASONE SODIUM PHOSPHATE 4 MG: 4 INJECTION, SOLUTION INTRAMUSCULAR; INTRAVENOUS at 09:12

## 2024-04-03 RX ADMIN — HYDRALAZINE HYDROCHLORIDE 75 MG: 50 TABLET ORAL at 14:12

## 2024-04-03 RX ADMIN — LACOSAMIDE 200 MG: 50 TABLET, FILM COATED ORAL at 20:21

## 2024-04-03 RX ADMIN — LACOSAMIDE 200 MG: 50 TABLET, FILM COATED ORAL at 09:09

## 2024-04-03 NOTE — CONSULTS
[facility]    Consult Note      Requesting Physician:  Jacob Crabtree     CHIEF COMPLAINT:  seizures       HISTORY OF PRESENT ILLNESS:    Mr. Doss  is a 54 y.o. male we are seeing in consultation for Glioblastoma. He is followed by Dr. Shields. He completed concurrent chemoradiation. Had his follow up brain MRI on 3/28/24 with progressive areas of marginal enhancement about the surgical cavity, with progression of multifocal other amorphous areas throughout the right cerebral hemisphere, right splenium corpus callosum, and right midbrain, highly concerning for progressive high-grade tumor.    He presented to the ED on 4/1 after suffering seizure.     Today he is seen with his family at the bedside. They have many questions about his disease and treatment plan. He reports he is feeling better. No further seizure activity.     Past Medical History:  Past Medical History:   Diagnosis Date    Hypertension     Seizure (HCC)        Past Surgical History:  Past Surgical History:   Procedure Laterality Date    CRANIOTOMY Right 11/30/2023    RIGHT TEMPORAL CRANIOTOMY FOR RESECTION OF BRAIN MASS performed by Saul White MD at [facility] OR       Current Medications:  Current Facility-Administered Medications   Medication Dose Route Frequency Provider Last Rate Last Admin    lacosamide (VIMPAT) tablet 200 mg  200 mg Oral BID Martha Gustafson, MIN - CNP   200 mg at 04/02/24 0845    sodium chloride flush 0.9 % injection 5-40 mL  5-40 mL IntraVENous 2 times per day Jada Cardenas MD   10 mL at 04/02/24 0905    sodium chloride flush 0.9 % injection 5-40 mL  5-40 mL IntraVENous PRN Jada Cardenas MD        0.9 % sodium chloride infusion   IntraVENous PRN Jada Cardenas MD        potassium chloride (KLOR-CON M) extended release tablet 40 mEq  40 mEq Oral PRN Theresa 
    Chart reviewed, events noted, and I have personally performed a face-to-face diagnostic evaluation on this patient. I have personally reviewed Resident's note and confirmed the documented past medical history, family history, social history, allergies, home medications, review of systems, vital signs, laboratory values, and hospital medications via my own chart review, in person with the patient, and/or in person with his family members as appropriate.  My findings are as follows:    Assessment  53yo man with known GBM and seizure disorder presented to ER with breakthrough seizure  Although he reports remembering all the events of the episode, which would be inconsistent with seizure, his wife is fairly clear that he was unresponsive during part of it and she does not feel he likely recalls all of it  Needless to say, with the presence of increased tumor burden his risk of seizures is higher  Additionally, he did drink a 6-pack of beer the day before this breakthrough seizure, which he does not typically do, and this may have lowered his seizure threshold as well    Recommendations  Agree with increased dose of Vimpat from 100/100 to 200/200  Continue Keppra at 2000/2000  Agree with addition of steroids; will leave for heme/onc to manage  Needs heme/onc evaluation given, per neurosurgery, there is no surgical option for him  Discussed with pt and his family that he cannot engage in any activity where loss of consciousness would be dangerous to himself or others (e.g. driving, climbing, swimming alone, etc.) until approved by his neurologist. They all expressed understanding and agreement.   He is already established with Dr. Tong from my practice and he should follow-up with her upon discharge  No further in-patient neurologic workup indicated; will sign off; call with questions.     History of Present Illness:  Erwin Doss is a 54 y.o. man with GBM resected in November and associated seizure disorder 
postsurgical changes from prior tumor resection in the  temporal lobe. There is vasogenic edema adjacent to the resection cavity, which  remains suspicious for recurrent/progressive tumor.  2. No evidence of acute intracranial hemorrhage or mass effect otherwise.  XR CHEST PORTABLE  Narrative: EXAM: XR CHEST PORTABLE    INDICATION: stroke symptoms    COMPARISON: December 12, 2023    FINDINGS:  Medical Devices: None.    Lungs: The lungs are clear.    Pleura: No pneumothorax or pleural effusion.    Heart and Mediastinum: The cardiomediastinal silhouette is within normal limits.    Bones: No acute suspicious abnormality.  Impression: 1.  No acute cardiopulmonary findings.      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  His initial posttreatment MRI shows new areas of enhancement and increased T2 flair changes, although in reviewing this MRI with his treatment planning scan and dose distribution, these areas appear to be within his treatment volume, most within 60 Gy, some of the more posterior areas and at the splenium at or near the 46 Gry.      As I discussed with them today, these MRI findings could be true recurrence (has unmethylated tumor which portends worse outcomes, but this would be an unusually rapid recurrence both in and out of field)

## 2024-04-03 NOTE — CARE COORDINATION
CM following: CM attended pt's room. Pt was sleeping. CM verified with family at bedside that cane was delivered to bedside. Pt and family plan for pt to return home at discharge. CM will continue to follow for discharge planning.  Electronically signed by LYNDSAY Washburn on 4/3/2024 at 2:10 PM  [phone]

## 2024-04-03 NOTE — PLAN OF CARE
Problem: ABCDS Injury Assessment  Goal: Absence of physical injury  Outcome: Progressing  Note: Seizure precautions in place this AM     Problem: Safety - Adult  Goal: Free from fall injury  4/3/2024 0814 by Shana Hurd RN  Outcome: Progressing  Note: Standard safety measures in place - call light within reach

## 2024-04-04 VITALS
OXYGEN SATURATION: 96 % | BODY MASS INDEX: 25.8 KG/M2 | HEART RATE: 61 BPM | SYSTOLIC BLOOD PRESSURE: 160 MMHG | TEMPERATURE: 98.3 F | WEIGHT: 180.2 LBS | DIASTOLIC BLOOD PRESSURE: 100 MMHG | HEIGHT: 70 IN | RESPIRATION RATE: 16 BRPM

## 2024-04-04 LAB
GLUCOSE BLD-MCNC: 114 MG/DL (ref 70–99)
PERFORMED ON: ABNORMAL

## 2024-04-04 PROCEDURE — 6370000000 HC RX 637 (ALT 250 FOR IP): Performed by: INTERNAL MEDICINE

## 2024-04-04 PROCEDURE — 6360000002 HC RX W HCPCS: Performed by: INTERNAL MEDICINE

## 2024-04-04 PROCEDURE — 6370000000 HC RX 637 (ALT 250 FOR IP)

## 2024-04-04 RX ORDER — DEXAMETHASONE 4 MG/1
4 TABLET ORAL EVERY 6 HOURS
Qty: 40 TABLET | Refills: 0 | Status: SHIPPED | OUTPATIENT
Start: 2024-04-04 | End: 2024-04-14

## 2024-04-04 RX ORDER — LACOSAMIDE 200 MG/1
200 TABLET ORAL 2 TIMES DAILY
Qty: 60 TABLET | Refills: 2 | Status: SHIPPED | OUTPATIENT
Start: 2024-04-04 | End: 2024-07-03

## 2024-04-04 RX ADMIN — LACOSAMIDE 200 MG: 50 TABLET, FILM COATED ORAL at 09:00

## 2024-04-04 RX ADMIN — DEXAMETHASONE SODIUM PHOSPHATE 4 MG: 4 INJECTION, SOLUTION INTRAMUSCULAR; INTRAVENOUS at 03:35

## 2024-04-04 RX ADMIN — NIFEDIPINE 30 MG: 30 TABLET, EXTENDED RELEASE ORAL at 09:02

## 2024-04-04 RX ADMIN — LEVETIRACETAM 2000 MG: 500 TABLET, FILM COATED ORAL at 09:02

## 2024-04-04 RX ADMIN — PANTOPRAZOLE SODIUM 40 MG: 40 TABLET, DELAYED RELEASE ORAL at 09:02

## 2024-04-04 RX ADMIN — HYDRALAZINE HYDROCHLORIDE 75 MG: 50 TABLET ORAL at 09:01

## 2024-04-04 RX ADMIN — DEXAMETHASONE SODIUM PHOSPHATE 4 MG: 4 INJECTION, SOLUTION INTRAMUSCULAR; INTRAVENOUS at 08:05

## 2024-04-04 NOTE — DISCHARGE SUMMARY
EOMI  ENT: neck supple  Cardiovascular: Regular rate.  Respiratory: Clear to auscultation  Gastrointestinal: Soft, non tender  Genitourinary: no suprapubic tenderness  Musculoskeletal: No edema  Skin: warm, dry  Neuro: Alert.  Psych: Mood appropriate.         Labs and Imaging   CTA HEAD NECK W CONTRAST    Result Date: 4/1/2024  CT ANGIOGRAM OF THE HEAD AND NECK INDICATION: Headache. TECHNIQUE: Spiral CT images of the neck and head were obtained. Intravenous administration of 75 mL of Isovue-370 contrast. Images were reconstructed as a MIP CT angiogram on the computer workstation. Up-to-date CT equipment and radiation dose reduction techniques were employed. CTA NECK: The great vessels of the neck are without occlusion, flow-limiting stenosis, aneurysm, or dissection. The left lobe of the thyroid gland has been resected. The submandibular glands and parotid glands are normal. No neck lymphadenopathy is identified. Lung apices are clear. No osteolytic or osteoblastic lesions are identified. There are changes of cervical spondylosis at the C5-C6 and C6-C7 disc base levels. CTA HEAD: The basilar artery is patent. The anterior, middle and posterior cerebral arteries are patent. No aneurysm is identified. No flowing stenosis is identified. Patient is status post a right temporal craniotomy. There our changes of encephalomalacia identified within the right temporal lobe. There are some areas of marginal enhancement identified along the right temporal lobe surgical cavity.     1. No evidence of any intracranial large vessel occlusion. 2. No evidence of any neck large vessel occlusion, stenosis or dissection. 3. Patient is status post a right temporal craniotomy and there are changes of encephalomalacia with abnormal peripheral enhancement within the right temporal lobe. Correlate with MRI head report dated 3/28/2024.    CT HEAD WO CONTRAST    Result Date: 4/1/2024  CT HEAD WITHOUT CONTRAST HISTORY: Stroke. History of

## 2024-04-04 NOTE — PLAN OF CARE
Problem: Safety - Adult  Goal: Free from fall injury  Outcome: Progressing   All fall precautions are in place. The bed locked and in its lowest position. The bed alarm is set and the pt has been reminded to call out prior to getting up. The overhead table with their personal belongings are within reach. The call bell is lying in the bed with this pt and easy to reach and use.

## 2024-04-04 NOTE — PLAN OF CARE
Problem: Discharge Planning  Goal: Discharge to home or other facility with appropriate resources  Outcome: Progressing  Flowsheets (Taken 4/3/2024 2000 by Jacob Malhotra, RN)  Discharge to home or other facility with appropriate resources:   Arrange for needed discharge resources and transportation as appropriate   Identify barriers to discharge with patient and caregiver  Note: Internal medicine following for management of seizure. Patient updated and educated on barriers to discharge and plan of care.     Problem: ABCDS Injury Assessment  Goal: Absence of physical injury  Outcome: Progressing  Note: Patient has remained free of physical injury this shift. Fall and safety precautions in place. Bed exit alarm activated, bed in lowest position with wheels locked, call light and belongings within reach.    Problem: Safety - Adult  Goal: Free from fall injury  4/4/2024 0729 by Cj Martinez, RN  Outcome: Progressing  Note: Patient has remained free of fall injury this shift. Patient tolerates ambulation x1 assist with cane. Gait belt and gripper socks applied for safety.

## 2024-04-05 NOTE — ADT AUTH CERT
0845(a)  04/02/24 0845(r) 2,000 mg   Oral      Wiehe, Fartun  32 Given 04/02/24 2100(s)  04/02/24 2029(a)  04/02/24 2032(r) 2,000 mg   Oral      Adan, Kweisi  33 Given 04/03/24 0900(s)  04/03/24 0910(a)  04/03/24 0915(r) 2,000 mg   Oral      Vannessa, Shana  34 Given 04/03/24 2100(s)  04/03/24 2020(a)  04/03/24 2022(r) 2,000 mg   Oral      Malhotra, Jacob  35 Given 04/04/24 0900(s)  04/04/24 0902(a)  04/04/24 0909(r) 2,000 mg   Oral      FantaCitlali  36 Given 04/01/24 2129(s)  04/01/24 2129(a)  04/01/24 2129(r) 5 mg   Oral      Johny Morales  37 Given 04/02/24 2030(s)  04/02/24 2030(a)  04/02/24 2032(r) 5 mg   Oral      Adan, Kwtrell  38 Given 04/02/24 0900(s)  04/02/24 0845(a)  04/02/24 0845(r) 30 mg   Oral      Wiehe, Fartun  39 Given 04/03/24 0900(s)  04/03/24 0909(a)  04/03/24 0915(r) 30 mg   Oral      Vannessa, Shana  40 Given 04/04/24 0900(s)  04/04/24 0902(a)  04/04/24 0909(r) 30 mg   Oral      Fanta, Citlali  41 Given 04/02/24 0700(s)  04/02/24 0603(a)  04/02/24 0603(r) 40 mg   Oral      Johny Morales  42 Given 04/03/24 0700(s)  04/03/24 0620(a)  04/03/24 0621(r) 40 mg   Oral      Adan, Maurice  43 Given 04/04/24 0700(s)  04/04/24 0902(a)  04/04/24 0909(r) 40 mg   Oral      Citlali Jewell  44 Not Given 04/01/24 2100(s)  04/01/24 2227(a)  04/01/24 2227(r)     IntraVENous     Reason: IV Fluid Infusing Johny Morales  45 Given 04/02/24 0900(s)  04/02/24 0905(a)  04/02/24 0905(r) 10 mL   IntraVENous      Fartun Kapoor  46 Given 04/02/24 2100(s)  04/02/24 2030(a)  04/02/24 2032(r) 10 mL   IntraVENous      Maurice Arellano  47 Given 04/03/24 0900(s)  04/03/24 1109(a)  04/03/24 1110(r) 10 mL   IntraVENous      Shana Hurd  48 Given 04/03/24 2100(s)  04/03/24 2213(a)  04/03/24 2213(r) 10 mL   IntraVENous      Jacob Malhotra  49 Not Given 04/04/24 0900(s)  04/04/24 1029(a)  04/04/24 1029(r)     IntraVENous     Reason: Other [facility]    (s)=scheduled time  (a)=action time  (r)=recorded time

## 2024-04-05 NOTE — PROGRESS NOTES
ONCOLOGY HEMATOLOGY CARE PROGRESS NOTE      SUBJECTIVE:    Patient is very upset today about staying in the hospital overnight for an MRI that was never completed. He is angry and c/o about how bad this hospital is. States he is going to check out AMA at noon. Neither he nor his wife will make any eye contact with me today. Wife is staring out window and will not speak to this provider.       OBJECTIVE        Physical    VITALS:  Patient Vitals for the past 24 hrs:   BP Temp Temp src Pulse Resp SpO2   04/04/24 0744 136/80 98.3 °F (36.8 °C) Oral 61 16 96 %   04/04/24 0330 (!) 152/89 97.9 °F (36.6 °C) Oral 59 16 --   04/04/24 0000 (!) 149/87 98.1 °F (36.7 °C) Oral 61 16 --   04/03/24 2000 (!) 146/86 98.3 °F (36.8 °C) Oral 62 16 95 %   04/03/24 1600 129/76 98.4 °F (36.9 °C) Oral 75 16 98 %   04/03/24 1400 107/81 -- -- -- -- --   04/03/24 1215 129/76 98.3 °F (36.8 °C) Oral 94 15 97 %       24HR INTAKE/OUTPUT:    Intake/Output Summary (Last 24 hours) at 4/4/2024 0900  Last data filed at 4/3/2024 1611  Gross per 24 hour   Intake 320 ml   Output 850 ml   Net -530 ml       Deferred    DATA:  CBC:    Recent Labs     04/01/24  1313   WBC 5.0   NEUTROABS 4.1   LYMPHOPCT 6.0   RBC 4.79   HGB 15.7   HCT 44.5   MCV 93.0   MCH 32.7   MCHC 35.2   RDW 14.3          PT/INR:    Recent Labs     04/01/24  1313   PROT 6.8   INR 1.10     PTT:  No results for input(s): \"APTT\" in the last 720 hours.    CMP:    Recent Labs     04/02/24  0542 04/01/24  1313    138   K 4.4 3.6    102   CO2 18* 15*   GLUCOSE 126* 123*   BUN 13 9   CREATININE 1.0 1.3   LABGLOM 90 66   CALCIUM 9.3 9.3   PROT  --  6.8   LABALBU  --  4.4   AGRATIO  --  1.8   BILITOT  --  1.1*   ALKPHOS  --  94   ALT  --  18   AST  --  14*       Lab Results   Component Value Date    CALCIUM 9.3 04/02/2024    PHOS 3.9 12/03/2023       LDH:No results for input(s): \"LDH\" in the last 720 hours.    Radiology Review:  CTA HEAD NECK 
    V2.0    [facility] Progress Note      Name:  Erwin Doss /Age/Sex: 1970  (54 y.o. male)   MRN & CSN:  [id number] & [id number] Encounter Date/Time: 4/3/2024 11:44 AM EDT   Location:  5517/5517-01 PCP: Javier Chavez     Attending:Mike Ennis MD       Hospital Day: 3    Assessment and Recommendations   Erwin Doss is a 54 y.o. male who presented to the emergency room for seizure activity.    Patient is approximately 5 months status postcraniotomy secondary to high-grade glioma.  Patient had a seizure which lasted for approximately 5 minutes at home.  And route to the hospital the patient had another seizure for which she was given IV Versed.      Plan:   Seizure activity-patient with history of absence and nonfocal seizure since her previous craniotomy.  Patient had 2 generalized tonic-clonic seizures.  Patient currently on Vimpat and Keppra.  Neurology consult pending  Glioblastoma-neurosurgery consulted from the ED.  MRI demonstrates diffuse tumor progression. No role for neurosurgery intervention.  Hypertension- Continue nifedipine    Radiation oncology consulted  Diet ADULT DIET; Regular   DVT Prophylaxis [] Lovenox, []  Heparin, [] SCDs, [] Ambulation,  [] Eliquis, [] Xarelto  [] Coumadin   Code Status Full Code   Disposition From: home  Expected Disposition: home  Estimated Date of Discharge: 1-2 days  Patient requires continued admission due to seizure   Surrogate Decision Maker/ POA  Per EMR     Personally reviewed Lab Studies and Imaging   4/3/2024  POCT glucose reviewed    Discussed care with onc and rad onc.      Subjective:     Chief Complaint: seizures    Erwin Doss is a 54 y.o. male who presents with a past medical history as detailed above.     On 4/3/2024 the patient was resting comfortably with family at bedside.  No further seizure activity       Review of Systems:      Pertinent positives and negatives discussed in HPI    Objective:     Intake/Output Summary (Last 24 hours) at 
  Physician Progress Note      PATIENT:               PEPE CHRISTIAN  [facility] #:                  [id number]  :                       1970  ADMIT DATE:       2024 12:52 PM  DISCH DATE:        2024 10:54 AM  RESPONDING  PROVIDER #:        Mike Ennis MD          QUERY TEXT:    Pt admitted with seizures. Per DC Summary pt noted to have hx of seizure since   his previous craniotomy approximately 5 months ago.  Per DC Summary, pt also   has Glioblastoma-MRI demonstrates diffuse tumor       progression. If   possible, please document in progress notes and discharge summary the   relationship, if any, between seizure and recent craniotomy or glioblastoma.    The medical record reflects the following:  Risk Factors: *54 y.o. male with hx of HTN, high grade glioma, s/p temporal   craniotomy  Clinical Indicators: presented with new onset seizure, witnessed by his wife  Treatment: Imaging, Vimpat, Keppra, Neurology consult, Neurosurgery consult,   Oncology/Hematology consult  Options provided:  -- Seizure likely r/t recent craniotomy as a complication  -- Seizure likely r/t Glioblastoma  -- Seizure unrelated to recent craniotomy and Glioblastoma  -- Other - I will add my own diagnosis  -- Disagree - Not applicable / Not valid  -- Disagree - Clinically unable to determine / Unknown  -- Refer to Clinical Documentation Reviewer    PROVIDER RESPONSE TEXT:    This patient has seizure likely r/t Glioblastoma.    Query created by: Pernell Mcguire on 2024 1:37 PM      Electronically signed by:  Mike Ennis MD 2024 4:36 PM          
4 Eyes Admission Assessment     I agree as the admission nurse that 2 RN's have performed a thorough Head to Toe Skin Assessment on the patient. ALL assessment sites listed below have been assessed on admission.       Areas assessed by both nurses:   [x]   Head, Face, and Ears   [x]   Shoulders, Back, and Chest  [x]   Arms, Elbows, and Hands   [x]   Coccyx, Sacrum, and Ischium  [x]   Legs, Feet, and Heels        Does the Patient have Skin Breakdown?  No         Kendell Prevention initiated:  Yes   Wound Care Orders initiated:  No      [facility] nurse consulted for Pressure Injury (Stage 3,4, Unstageable, DTI, NWPT, and Complex wounds) or Kendell score 18 or lower:  No      Nurse 1 eSignature: Electronically signed by Johny Morales RN on 4/2/24 at 4:13 AM EDT    **SHARE this note so that the co-signing nurse is able to place an eSignature**    Nurse 2 eSignature: {Esignature:[id number]}   
Patient is A&Ox4. No Neuro changes this shift. VSS this shift . Patient has not endorsed any pain. Patient is tolerating PO diet. Voiding via urinal.Patient updated on plan of care. Fall and safety precautions in place, call light within reach.    
Patient is A&Ox4. VSS this shift.    Vitals:    04/03/24 0900   BP: 139/86   Pulse: 74   Resp: 16   Temp: 98.1 °F (36.7 °C)   SpO2: 96%     Patient has denied pain, nausea, and discomfort. Patient is tolerating PO diet. Tolerating ambulation x1 contact-guard assist with cane. Patient updated on plan of care. Fall and safety precautions in place, call light within reach. Awaiting possible discharge today.       
Patient is A&Ox4. VSS this shift. Patient had denied pain, nausea, and discomfort. Patient is tolerating PO diet. Tolerating ambulation x1 assist with cane. Voided bathroom privileges. Discharged this AM. Fall and safety precautions in place.    
Patient is alert and oriented x4. No neuro changes noted. VSS on room air. Pt denies pain. Pt voiding adequately via urinal. Pt tolerating PO intake well. Call light within reach. Fall precautions in place. Plan of care continues.  
Patient is alert and oriented. Vital signs are stable. No changes to neuro assessment thus far this shift. Bed is in the lowest position. Bed alarm is activated. Call light is within reach. Will continue to monitor and reassess.  
Patient removed tele monitor and stickers. Says he \"isn't having any cardiac issues so there is no reason to keep the monitor on\". MD made aware.   
of  encephalomalacia with abnormal peripheral enhancement within the right temporal  lobe. Correlate with MRI head report dated 3/28/2024.  CT HEAD WO CONTRAST  Narrative: CT HEAD WITHOUT CONTRAST    HISTORY: Stroke. History of glioblastoma.    PROCEDURE: Noncontrast CT the brain performed with 5 mm contiguous sections.  Individualized dose optimization technique was used in order to meet ALARA  standards for radiation dose reduction.  In addition to vendor specific dose  reduction algorithms, the dose reduction techniques vary based on the specific  scanner utilized but frequently include automated exposure control, adjustment  of the mA and/or kV according to patient size, and use of iterative  reconstruction technique.    COMPARISON: MRI head March 28, 2024    FINDINGS:    Redemonstration of prior craniotomy in the right temporal region. There is a  surgical cavity in the right temporal lobe with adjacent vasogenic edema. No  significant mass effect or midline shift. No other areas of new altered  gray-white matter differentiation. The ventricles are normal in size and  position. The structures of the posterior fossa are normal.  Impression: 1. Redemonstration of postsurgical changes from prior tumor resection in the  temporal lobe. There is vasogenic edema adjacent to the resection cavity, which  remains suspicious for recurrent/progressive tumor.  2. No evidence of acute intracranial hemorrhage or mass effect otherwise.  XR CHEST PORTABLE  Narrative: EXAM: XR CHEST PORTABLE    INDICATION: stroke symptoms    COMPARISON: December 12, 2023    FINDINGS:  Medical Devices: None.    Lungs: The lungs are clear.    Pleura: No pneumothorax or pleural effusion.    Heart and Mediastinum: The cardiomediastinal silhouette is within normal limits.    Bones: No acute suspicious abnormality.  Impression: 1.  No acute cardiopulmonary findings.      Problem List  Patient Active Problem List   Diagnosis    Seizure (HCC)    
Date/Time    NITRU Negative 11/27/2023 07:50 PM    COLORU Yellow 11/27/2023 07:50 PM    PHUR 6.5 11/27/2023 08:00 PM    WBCUA 4 10/29/2023 03:40 AM    RBCUA 2 10/29/2023 03:40 AM    BACTERIA None Seen 10/29/2023 03:40 AM    CLARITYU Clear 11/27/2023 07:50 PM    SPECGRAV 1.015 11/27/2023 07:50 PM    LEUKOCYTESUR Negative 11/27/2023 07:50 PM    UROBILINOGEN 0.2 11/27/2023 07:50 PM    BILIRUBINUR Negative 11/27/2023 07:50 PM    BLOODU Negative 11/27/2023 07:50 PM    GLUCOSEU Negative 11/27/2023 07:50 PM    KETUA Negative 11/27/2023 07:50 PM     Urine Cultures: No results found for: \"LABURIN\"  Blood Cultures: No results found for: \"BC\"  No results found for: \"BLOODCULT2\"  Organism: No results found for: \"ORG\"      Electronically signed by MIN Camilo CNP on 4/2/2024 at 11:44 AM

## 2024-04-25 ENCOUNTER — TRANSCRIBE ORDERS (OUTPATIENT)
Dept: ADMINISTRATIVE | Age: 54
End: 2024-04-25

## 2024-04-25 DIAGNOSIS — C71.2 GLIOBLASTOMA OF TEMPORAL LOBE (HCC): Primary | ICD-10-CM

## 2024-05-10 ENCOUNTER — HOSPITAL ENCOUNTER (EMERGENCY)
Age: 54
Discharge: HOME | End: 2024-05-10
Attending: EMERGENCY MEDICINE
Payer: COMMERCIAL

## 2024-05-10 ENCOUNTER — APPOINTMENT (OUTPATIENT)
Dept: MRI IMAGING | Age: 54
End: 2024-05-10
Payer: COMMERCIAL

## 2024-05-10 ENCOUNTER — APPOINTMENT (OUTPATIENT)
Dept: CT IMAGING | Age: 54
End: 2024-05-10
Payer: COMMERCIAL

## 2024-05-10 ENCOUNTER — APPOINTMENT (OUTPATIENT)
Dept: GENERAL RADIOLOGY | Age: 54
End: 2024-05-10
Payer: COMMERCIAL

## 2024-05-10 VITALS
DIASTOLIC BLOOD PRESSURE: 80 MMHG | BODY MASS INDEX: 25.97 KG/M2 | HEART RATE: 63 BPM | OXYGEN SATURATION: 97 % | WEIGHT: 181 LBS | TEMPERATURE: 98.3 F | RESPIRATION RATE: 15 BRPM | SYSTOLIC BLOOD PRESSURE: 111 MMHG

## 2024-05-10 DIAGNOSIS — R56.9 SEIZURE (HCC): Primary | ICD-10-CM

## 2024-05-10 LAB
ABO + RH BLD: NORMAL
ALBUMIN SERPL-MCNC: 4.2 G/DL (ref 3.4–5)
ALBUMIN/GLOB SERPL: 1.6 {RATIO} (ref 1.1–2.2)
ALP SERPL-CCNC: 93 U/L (ref 40–129)
ALT SERPL-CCNC: 11 U/L (ref 10–40)
ANION GAP SERPL CALCULATED.3IONS-SCNC: 15 MMOL/L (ref 3–16)
AST SERPL-CCNC: 21 U/L (ref 15–37)
BACTERIA URNS QL MICRO: ABNORMAL /HPF
BASE EXCESS BLDV CALC-SCNC: -2.4 MMOL/L (ref -2–3)
BASE EXCESS BLDV CALC-SCNC: -5.2 MMOL/L (ref -2–3)
BASOPHILS # BLD: 0 K/UL (ref 0–0.2)
BASOPHILS NFR BLD: 1.1 %
BILIRUB SERPL-MCNC: 1.1 MG/DL (ref 0–1)
BILIRUB UR QL STRIP.AUTO: NEGATIVE
BLD GP AB SCN SERPL QL: NORMAL
BUN SERPL-MCNC: 9 MG/DL (ref 7–20)
CALCIUM SERPL-MCNC: 9.2 MG/DL (ref 8.3–10.6)
CHLORIDE SERPL-SCNC: 108 MMOL/L (ref 99–110)
CLARITY UR: CLEAR
CO2 BLDV-SCNC: 21 MMOL/L
CO2 BLDV-SCNC: 25 MMOL/L
CO2 SERPL-SCNC: 18 MMOL/L (ref 21–32)
COHGB MFR BLDV: 2.1 % (ref 0–1.5)
COHGB MFR BLDV: 2.4 % (ref 0–1.5)
COLOR UR: YELLOW
CREAT SERPL-MCNC: 1 MG/DL (ref 0.9–1.3)
DEPRECATED RDW RBC AUTO: 13.3 % (ref 12.4–15.4)
DO-HGB MFR BLDV: 48.2 %
EKG ATRIAL RATE: 62 BPM
EKG DIAGNOSIS: NORMAL
EKG P AXIS: 62 DEGREES
EKG P-R INTERVAL: 120 MS
EKG Q-T INTERVAL: 400 MS
EKG QRS DURATION: 96 MS
EKG QTC CALCULATION (BAZETT): 406 MS
EKG R AXIS: 68 DEGREES
EKG T AXIS: 83 DEGREES
EKG VENTRICULAR RATE: 62 BPM
EOSINOPHIL # BLD: 0.1 K/UL (ref 0–0.6)
EOSINOPHIL NFR BLD: 2.2 %
GFR SERPLBLD CREATININE-BSD FMLA CKD-EPI: 90 ML/MIN/{1.73_M2}
GLUCOSE BLD-MCNC: 115 MG/DL (ref 70–99)
GLUCOSE SERPL-MCNC: 105 MG/DL (ref 70–99)
GLUCOSE UR STRIP.AUTO-MCNC: NEGATIVE MG/DL
HCO3 BLDV-SCNC: 19.7 MMOL/L (ref 24–28)
HCO3 BLDV-SCNC: 23.8 MMOL/L (ref 24–28)
HCT VFR BLD AUTO: 45.5 % (ref 40.5–52.5)
HGB BLD-MCNC: 16 G/DL (ref 13.5–17.5)
HGB UR QL STRIP.AUTO: NEGATIVE
INR PPP: 0.97 (ref 0.85–1.15)
KETONES UR STRIP.AUTO-MCNC: NEGATIVE MG/DL
LACTATE BLDV-SCNC: 2.7 MMOL/L (ref 0.4–2)
LACTATE BLDV-SCNC: 5.1 MMOL/L (ref 0.4–2)
LEUKOCYTE ESTERASE UR QL STRIP.AUTO: NEGATIVE
LYMPHOCYTES # BLD: 0.3 K/UL (ref 1–5.1)
LYMPHOCYTES NFR BLD: 9.6 %
MCH RBC QN AUTO: 32.4 PG (ref 26–34)
MCHC RBC AUTO-ENTMCNC: 35.3 G/DL (ref 31–36)
MCV RBC AUTO: 91.8 FL (ref 80–100)
METHGB MFR BLDV: 0.2 % (ref 0–1.5)
METHGB MFR BLDV: 0.5 % (ref 0–1.5)
MONOCYTES # BLD: 0.4 K/UL (ref 0–1.3)
MONOCYTES NFR BLD: 11.3 %
NEUTROPHILS # BLD: 2.6 K/UL (ref 1.7–7.7)
NEUTROPHILS NFR BLD: 75.8 %
NITRITE UR QL STRIP.AUTO: NEGATIVE
PCO2 BLDV: 35.8 MMHG (ref 41–51)
PCO2 BLDV: 44.8 MMHG (ref 41–51)
PERFORMED ON: ABNORMAL
PH BLDV: 7.33 [PH] (ref 7.35–7.45)
PH BLDV: 7.35 [PH] (ref 7.35–7.45)
PH UR STRIP.AUTO: 6.5 [PH] (ref 5–8)
PLATELET # BLD AUTO: 186 K/UL (ref 135–450)
PMV BLD AUTO: 6.6 FL (ref 5–10.5)
PO2 BLDV: 88.7 MMHG (ref 25–40)
PO2 BLDV: <30 MMHG (ref 25–40)
POTASSIUM SERPL-SCNC: 4.7 MMOL/L (ref 3.5–5.1)
PROT SERPL-MCNC: 6.9 G/DL (ref 6.4–8.2)
PROT UR STRIP.AUTO-MCNC: NEGATIVE MG/DL
PROTHROMBIN TIME: 13.1 SEC (ref 11.9–14.9)
RBC # BLD AUTO: 4.95 M/UL (ref 4.2–5.9)
RBC #/AREA URNS HPF: ABNORMAL /HPF (ref 0–4)
SAO2 % BLDV: 50 %
SAO2 % BLDV: 98 %
SODIUM SERPL-SCNC: 141 MMOL/L (ref 136–145)
SP GR UR STRIP.AUTO: 1.01 (ref 1–1.03)
TROPONIN, HIGH SENSITIVITY: <6 NG/L (ref 0–22)
UA DIPSTICK W REFLEX MICRO PNL UR: ABNORMAL
URN SPEC COLLECT METH UR: ABNORMAL
UROBILINOGEN UR STRIP-ACNC: 0.2 E.U./DL
WBC # BLD AUTO: 3.4 K/UL (ref 4–11)
WBC #/AREA URNS HPF: ABNORMAL /HPF (ref 0–5)

## 2024-05-10 PROCEDURE — 70498 CT ANGIOGRAPHY NECK: CPT

## 2024-05-10 PROCEDURE — 71045 X-RAY EXAM CHEST 1 VIEW: CPT

## 2024-05-10 PROCEDURE — 84484 ASSAY OF TROPONIN QUANT: CPT

## 2024-05-10 PROCEDURE — 6360000004 HC RX CONTRAST MEDICATION

## 2024-05-10 PROCEDURE — 86901 BLOOD TYPING SEROLOGIC RH(D): CPT

## 2024-05-10 PROCEDURE — 86850 RBC ANTIBODY SCREEN: CPT

## 2024-05-10 PROCEDURE — 96361 HYDRATE IV INFUSION ADD-ON: CPT

## 2024-05-10 PROCEDURE — 86900 BLOOD TYPING SEROLOGIC ABO: CPT

## 2024-05-10 PROCEDURE — 85610 PROTHROMBIN TIME: CPT

## 2024-05-10 PROCEDURE — 85025 COMPLETE CBC W/AUTO DIFF WBC: CPT

## 2024-05-10 PROCEDURE — 36415 COLL VENOUS BLD VENIPUNCTURE: CPT

## 2024-05-10 PROCEDURE — 70553 MRI BRAIN STEM W/O & W/DYE: CPT

## 2024-05-10 PROCEDURE — 96375 TX/PRO/DX INJ NEW DRUG ADDON: CPT

## 2024-05-10 PROCEDURE — 82803 BLOOD GASES ANY COMBINATION: CPT

## 2024-05-10 PROCEDURE — 96374 THER/PROPH/DIAG INJ IV PUSH: CPT

## 2024-05-10 PROCEDURE — 93005 ELECTROCARDIOGRAM TRACING: CPT

## 2024-05-10 PROCEDURE — 70450 CT HEAD/BRAIN W/O DYE: CPT

## 2024-05-10 PROCEDURE — 80053 COMPREHEN METABOLIC PANEL: CPT

## 2024-05-10 PROCEDURE — 81001 URINALYSIS AUTO W/SCOPE: CPT

## 2024-05-10 PROCEDURE — 99285 EMERGENCY DEPT VISIT HI MDM: CPT

## 2024-05-10 PROCEDURE — 96376 TX/PRO/DX INJ SAME DRUG ADON: CPT

## 2024-05-10 PROCEDURE — 2580000003 HC RX 258

## 2024-05-10 PROCEDURE — 6360000002 HC RX W HCPCS

## 2024-05-10 PROCEDURE — A9576 INJ PROHANCE MULTIPACK: HCPCS

## 2024-05-10 PROCEDURE — 83605 ASSAY OF LACTIC ACID: CPT

## 2024-05-10 RX ORDER — DIPHENHYDRAMINE HYDROCHLORIDE 50 MG/ML
50 INJECTION INTRAMUSCULAR; INTRAVENOUS ONCE
Status: COMPLETED | OUTPATIENT
Start: 2024-05-10 | End: 2024-05-10

## 2024-05-10 RX ORDER — LEVETIRACETAM 500 MG/5ML
2000 INJECTION, SOLUTION, CONCENTRATE INTRAVENOUS ONCE
Status: COMPLETED | OUTPATIENT
Start: 2024-05-10 | End: 2024-05-10

## 2024-05-10 RX ORDER — SODIUM CHLORIDE, SODIUM LACTATE, POTASSIUM CHLORIDE, AND CALCIUM CHLORIDE .6; .31; .03; .02 G/100ML; G/100ML; G/100ML; G/100ML
1000 INJECTION, SOLUTION INTRAVENOUS ONCE
Status: COMPLETED | OUTPATIENT
Start: 2024-05-10 | End: 2024-05-10

## 2024-05-10 RX ORDER — MIDAZOLAM HYDROCHLORIDE 5 MG/5ML
5 INJECTION, SOLUTION INTRAMUSCULAR; INTRAVENOUS ONCE
Status: COMPLETED | OUTPATIENT
Start: 2024-05-10 | End: 2024-05-10

## 2024-05-10 RX ADMIN — METHYLPREDNISOLONE SODIUM SUCCINATE 40 MG: 40 INJECTION INTRAMUSCULAR; INTRAVENOUS at 12:43

## 2024-05-10 RX ADMIN — WATER 40 MG: 1 INJECTION INTRAMUSCULAR; INTRAVENOUS; SUBCUTANEOUS at 16:49

## 2024-05-10 RX ADMIN — SODIUM CHLORIDE, POTASSIUM CHLORIDE, SODIUM LACTATE AND CALCIUM CHLORIDE 1000 ML: 600; 310; 30; 20 INJECTION, SOLUTION INTRAVENOUS at 14:30

## 2024-05-10 RX ADMIN — LEVETIRACETAM 2000 MG: 500 INJECTION INTRAVENOUS at 13:18

## 2024-05-10 RX ADMIN — DIPHENHYDRAMINE HYDROCHLORIDE 50 MG: 50 INJECTION, SOLUTION INTRAMUSCULAR; INTRAVENOUS at 12:54

## 2024-05-10 RX ADMIN — MIDAZOLAM 5 MG: 1 INJECTION INTRAMUSCULAR; INTRAVENOUS at 13:25

## 2024-05-10 RX ADMIN — IOPAMIDOL 75 ML: 755 INJECTION, SOLUTION INTRAVENOUS at 13:14

## 2024-05-10 RX ADMIN — GADOTERIDOL 18 ML: 279.3 INJECTION, SOLUTION INTRAVENOUS at 16:26

## 2024-05-10 ASSESSMENT — ENCOUNTER SYMPTOMS
EYES NEGATIVE: 1
ALLERGIC/IMMUNOLOGIC NEGATIVE: 1
GASTROINTESTINAL NEGATIVE: 1
RESPIRATORY NEGATIVE: 1

## 2024-05-10 ASSESSMENT — PAIN - FUNCTIONAL ASSESSMENT: PAIN_FUNCTIONAL_ASSESSMENT: NONE - DENIES PAIN

## 2024-05-10 NOTE — ED PROVIDER NOTES
THE [facility]  EMERGENCY DEPARTMENT ENCOUNTER          EM RESIDENT NOTE       Date of evaluation: 5/10/2024    Chief Complaint     Seizures (Per EMS pt had seizure effecting left arm, EMS reports seizure activity for approx 25 minutes. Pt given 10 mg of Versed IM prior to arrival. Pt glucose 109. )      History of Present Illness     Erwin Doss is a 54 y.o. male who presents from home via EMS after being found to have a seizure with left-sided arm shaking and twitching.  Patient was at that time chewing on tobacco and was having difficulty spitting it out.  As per EMS he received 10 of Versed intramuscular.  His glucose was 109 and route.  As per the EMS team the patient had a seizure for 25 minutes.  No incontinence was observed.  Collateral to the patient's wife is remarkable for an unwitnessed seizure activity.  As per the lab the patient has a history of glioblastoma.  As per the wife the patient is compliant with his medications.  When the patient is asked what is going on he states that he is having a seizure.    MEDICAL DECISION MAKING / ASSESSMENT / PLAN     INITIAL VITALS: BP: (!) 139/95, Temp: 98 °F (36.7 °C), Pulse: 93, Respirations: 16, SpO2: 95 %    Erwin Doss is a 54 y.o. male coming to the ED the via EMS with a concern of a seizure.  At the time of evaluation the.  She has not had a right-sided gaze deviation and was leaning towards the right side.  His pupils were reactive.  He was able to respond briefly to some sentences.  The patient is noted to have left-sided paralysis.  At this time as per the wife his last known normal was about 45 minutes prior to presenting to the hospital.  I went ahead and called a code stroke on this patient as his deficits were concerning for possible stroke versus seizure.  Patient CT imaging was unremarkable for stroke.  Neurology team was at bedside to evaluate the patient.  Patient received 2 g of Keppra as well as another dose of 5 mg of Versed.  In the  MG tablet Take 1 tablet by mouth daily, Disp-30 tablet, R-3Normal      hydrALAZINE (APRESOLINE) 50 MG tablet Take 1.5 tablets by mouth every 8 hours, Disp-135 tablet, R-3Normal             Allergies     He is allergic to iodine.    Physical Exam     INITIAL VITALS: BP: (!) 139/95, Temp: 98 °F (36.7 °C), Pulse: 93, Respirations: 16, SpO2: 95 %   Physical Exam  Constitutional:       General: He is in acute distress.      Appearance: He is not toxic-appearing or diaphoretic.   HENT:      Head: Normocephalic.   Eyes:      Extraocular Movements: Extraocular movements intact.      Pupils: Pupils are equal, round, and reactive to light.   Cardiovascular:      Rate and Rhythm: Normal rate and regular rhythm.   Pulmonary:      Effort: No respiratory distress.      Breath sounds: No stridor.   Abdominal:      General: There is no distension.      Palpations: There is no mass.   Musculoskeletal:      Cervical back: No rigidity or tenderness.   Neurological:      Comments: Actively seizing upon arrival with twitching of right face, left sided paralysis. Upon reassessment post seizure patient had sensation and strength equally in all extremities and he was back to his baseline; patient alert and oriented to self, place, time, situation. No focal  neuro deficits                Audra Silverman MD  Resident  05/10/24 1939

## 2024-05-10 NOTE — ED NOTES
ED TO INPATIENT SBAR HANDOFF    Patient Name: Erwin Doss   :  1970  54 y.o.   MRN:  [id number]  Preferred Name    ED Room #:  A10/A10-10  Family/Caregiver Present no   Restraints no   Sitter no   Sepsis Risk Score Sepsis Risk Score: 0.97    Situation  Code Status: Prior No additional code details.    Allergies: Iodine  Weight: Patient Vitals for the past 96 hrs (Last 3 readings):   Weight   05/10/24 1315 82.1 kg (181 lb)     Arrived from: home  Chief Complaint:   Chief Complaint   Patient presents with    Seizures     Per EMS pt had seizure effecting left arm, EMS reports seizure activity for approx 25 minutes. Pt given 10 mg of Versed IM prior to arrival. Pt glucose 109.      Hospital Problem/Diagnosis:  Active Problems:    * No active hospital problems. *  Resolved Problems:    * No resolved hospital problems. *    Imaging:   XR CHEST PORTABLE   Final Result   1.  Mild right basilar parenchymal opacities favored reflect atelectasis.         CTA HEAD NECK W CONTRAST   Final Result      Neck   1.  No significant stenosis of the carotid or vertebral systems is identified.      Head   1.  No evidence of large vessel occlusion or significant stenosis in the   intracranial hemorrhage. No interval change compared to CTA from 2024         CT HEAD WO CONTRAST   Final Result   1.  No acute intracranial hemorrhage or mass effect.   2.  Postsurgical changes are present with prior right temporal lobe craniotomy   with encephalomalacia. Residual vasogenic edema is present in the right temporal   lobe which may be due to recurrent/progressive tumor.      Findings were called to the emergency room physician by me at the time of this   dictation.      MRI BRAIN W WO CONTRAST    (Results Pending)     Abnormal labs:   Abnormal Labs Reviewed   CBC WITH AUTO DIFFERENTIAL - Abnormal; Notable for the following components:       Result Value    WBC 3.4 (*)     Lymphocytes Absolute 0.3 (*)     All other components within  administered: See MAR  Mental Status: oriented and alert  Orientation Level:    NIH Score: Total: 11  C-SSRS: Risk of Suicide: No Risk  Bedside swallow:    [place] Coma Scale (GCS): Henrry Coma Scale  Eye Opening: Spontaneous  Best Verbal Response: Oriented  Best Motor Response: Obeys commands  Henrry Coma Scale Score: 15  Active LDA's:   Peripheral IV 05/10/24 Left Hand (Active)   Site Assessment Clean, dry & intact 05/10/24 1231   Line Status Blood return noted 05/10/24 1231   Phlebitis Assessment No symptoms 05/10/24 1231   Infiltration Assessment 0 05/10/24 1231                 PO Status: Nothing by Mouth  Pertinent or High Risk Medications/Drips: no   If Yes, please provide details:   Pending Blood Product Administration: no       You may also review the ED PT Care Timeline found under the Summary Nursing Index tab.    Recommendation    Pending orders EEG  Plan for Discharge (if known):   Additional Comments: na   If any further questions, please call Sending RN at ed    Electronically signed by: Electronically signed by Sonia Aviles RN on 5/10/2024 at 4:16 PM            Sonia Aviles RN  05/10/24 6950

## 2024-05-10 NOTE — ED PROVIDER NOTES
ED Attending Attestation Note     Date of evaluation: 5/10/2024    This patient was seen by the resident.  I have seen and examined the patient, agree with the workup, evaluation, management and diagnosis. The care plan has been discussed.  I have reviewed the ECG and concur with the resident's interpretation.  My assessment reveals a male who presents with a seizure.  EMS reports that he got 10 of Versed and route.  On arrival patient has some shaking movements of his head and left upper extremity.  He is able to answer questions appropriately.  His eyes are not deviated and he is able to cross midline.  He is able to follow commands but has flaccid paralysis noted to the left side.  Stroke code was called due to the flaccid paralysis on the left side.  While patient was in CT scanner the resident was able to get a hold of the wife who reports the patient has a known glioblastoma and has partial complex seizures.    Due to the immediate potential for life-threatening deterioration due to seizure activity, I spent 35 minutes providing critical care.  This time excludes time spent performing procedures but includes time spent on direct patient care, history retrieval, review of the chart, and discussions with patient, family, and consultant(s).     Elizabeth Fountain MD  05/10/24 2571

## 2024-05-10 NOTE — DISCHARGE INSTRUCTIONS
You were seen in the ED today with a seizure. You received keppra and versed in the ED. Please follow up with neurology as soon as possible for an evaluation. Please continue to take you seizure medications at home as prescribed. Please return to the ED for worsening symptoms.

## 2024-05-10 NOTE — CONSULTS
Neurology consult Note      Patient: Erwin Doss MRN: [id number]    YOB: 1970  Age: 54 y.o.  Sex: male   Unit: [facility] EMERGENCY DEPT Room/Bed: 0/A10-10 Location: [facility]    Date of Consultation: 5/10/2024  Date of Admission: 5/10/2024 12:22 PM ( LOS: 0 days )    Consult Requested By: MD Herrera     Reason for Consult: \"TIA stroke workup\"    Chief Complaint:   I had another seizure    History of Present Illness:    History obtained from chart review, history from patient; he is lethargic and not able to provide much history.    Erwin Doss is a 54 y.o. man with GBM resected in November 2023 and associated seizure disorder presented to ER with left-sided arm shaking and twitching.  He was chewing on tobacco and is having difficulty spitting it out.  EMS gave him 10 mg of intramuscular Versed.  Per EMS, he had a seizure for 25 minutes.  No incontinence.    He is known to the [facility] neurology service.  He was admitted last month with a breakthrough seizure.    He reports good compliance with his medication.  In the ER, he was thought to have left-sided weakness.  Emergency medicine resident called a stroke code on this patient.  He was seen by the neurology NP at the bedside, and deemed to be seizing.  He was loaded with levetiracetam and Versed and the seizure stopped.    He is now lethargic, but able to answer questions briefly and follow commands appropriately.    He denies any recent illness.  He denies any head trauma.  No recent change in medication.        Past Medical History:  The patient has  has a past medical history of Hypertension and Seizure (HCC).    Past Surgical History:  Past Surgical History:   Procedure Laterality Date    CRANIOTOMY Right 11/30/2023    RIGHT TEMPORAL CRANIOTOMY FOR RESECTION OF BRAIN MASS performed by Saul White MD at [facility] OR       Family History:  family history is not on file.    Social History:  he reports that he has been  Negative Negative    pH, Urine 6.5 5.0 - 8.0    Protein, UA Negative Negative mg/dL    Urobilinogen, Urine 0.2 <2.0 E.U./dL    Nitrite, Urine Negative Negative    Leukocyte Esterase, Urine Negative Negative    Microscopic Examination Not Indicated     Urine Type NotGiven     WBC, UA None seen 0 - 5 /HPF    RBC, UA None seen 0 - 4 /HPF    Bacteria, UA Rare (A) None Seen /HPF   POCT Glucose    Collection Time: 05/10/24  3:33 PM   Result Value Ref Range    POC Glucose 115 (H) 70 - 99 mg/dl    Performed on ACCU-CHEK        Scheduled Meds:   methylPREDNISolone  40 mg IntraVENous Q4H       Continuous Infusions:          ASSESSMENT & RECOMMENDATIONS:     Glioblastoma multiforme  Breakthrough seizure  Seizure disorder    Breakthrough seizure in patient with known GBM, compliant with meds.  Loaded with LEV in the ER and given Versed.    On LEV 2000 mg BID at home, and lacosamide 200 mg BID.    Obtain MRI brain w/wo - pending.  Initiate CEEG monitoring     Will follow and adjust AEDs PRN.      A copy of this note was provided for Elizabeth Varela MD     Electronically signed by:    Kadeem Hughes MD  Consultant Neurologist  [facility]  [phone]     5/10/2024,4:16 PM

## 2024-07-02 ENCOUNTER — HOSPITAL ENCOUNTER (OUTPATIENT)
Dept: MRI IMAGING | Age: 54
Discharge: HOME | End: 2024-07-02
Attending: RADIOLOGY
Payer: COMMERCIAL

## 2024-07-02 DIAGNOSIS — C71.2 GLIOBLASTOMA OF TEMPORAL LOBE (HCC): ICD-10-CM

## 2024-07-02 PROCEDURE — 70553 MRI BRAIN STEM W/O & W/DYE: CPT

## 2024-07-02 PROCEDURE — A9576 INJ PROHANCE MULTIPACK: HCPCS | Performed by: RADIOLOGY

## 2024-07-02 PROCEDURE — 6360000004 HC RX CONTRAST MEDICATION: Performed by: RADIOLOGY

## 2024-07-02 RX ADMIN — GADOTERIDOL 18 ML: 279.3 INJECTION, SOLUTION INTRAVENOUS at 20:16

## 2024-07-22 ENCOUNTER — HOSPITAL ENCOUNTER (EMERGENCY)
Age: 54
Discharge: HOME | End: 2024-07-22
Attending: EMERGENCY MEDICINE
Payer: COMMERCIAL

## 2024-07-22 ENCOUNTER — APPOINTMENT (OUTPATIENT)
Dept: CT IMAGING | Age: 54
End: 2024-07-22
Payer: COMMERCIAL

## 2024-07-22 VITALS
SYSTOLIC BLOOD PRESSURE: 129 MMHG | RESPIRATION RATE: 19 BRPM | BODY MASS INDEX: 25.77 KG/M2 | DIASTOLIC BLOOD PRESSURE: 69 MMHG | OXYGEN SATURATION: 99 % | TEMPERATURE: 98 F | HEIGHT: 70 IN | HEART RATE: 65 BPM | WEIGHT: 180 LBS

## 2024-07-22 DIAGNOSIS — R56.9 SEIZURE (HCC): ICD-10-CM
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
## 2024-07-22 LAB
ALBUMIN SERPL-MCNC: 4.2 G/DL (ref 3.4–5)
AMORPH SED URNS QL MICRO: ABNORMAL /HPF
ANION GAP SERPL CALCULATED.3IONS-SCNC: 10 MMOL/L (ref 3–16)
BASOPHILS # BLD: 0 K/UL (ref 0–0.2)
BASOPHILS NFR BLD: 0.6 %
BILIRUB UR QL STRIP.AUTO: NEGATIVE
BUN SERPL-MCNC: 8 MG/DL (ref 7–20)
CALCIUM SERPL-MCNC: 9.2 MG/DL (ref 8.3–10.6)
CHLORIDE SERPL-SCNC: 107 MMOL/L (ref 99–110)
CLARITY UR: CLEAR
CO2 SERPL-SCNC: 25 MMOL/L (ref 21–32)
COLOR UR: YELLOW
CREAT SERPL-MCNC: 1 MG/DL (ref 0.9–1.3)
DEPRECATED RDW RBC AUTO: 14.6 % (ref 12.4–15.4)
EOSINOPHIL # BLD: 0.1 K/UL (ref 0–0.6)
EOSINOPHIL NFR BLD: 3.6 %
GFR SERPLBLD CREATININE-BSD FMLA CKD-EPI: 90 ML/MIN/{1.73_M2}
GLUCOSE SERPL-MCNC: 106 MG/DL (ref 70–99)
GLUCOSE UR STRIP.AUTO-MCNC: NEGATIVE MG/DL
HCT VFR BLD AUTO: 41.4 % (ref 40.5–52.5)
HGB BLD-MCNC: 14.5 G/DL (ref 13.5–17.5)
HGB UR QL STRIP.AUTO: NEGATIVE
KETONES UR STRIP.AUTO-MCNC: NEGATIVE MG/DL
LEUKOCYTE ESTERASE UR QL STRIP.AUTO: NEGATIVE
LYMPHOCYTES # BLD: 0.2 K/UL (ref 1–5.1)
LYMPHOCYTES NFR BLD: 7 %
MAGNESIUM SERPL-MCNC: 2.1 MG/DL (ref 1.8–2.4)
MCH RBC QN AUTO: 33.6 PG (ref 26–34)
MCHC RBC AUTO-ENTMCNC: 35 G/DL (ref 31–36)
MCV RBC AUTO: 96 FL (ref 80–100)
MONOCYTES # BLD: 0.3 K/UL (ref 0–1.3)
MONOCYTES NFR BLD: 10.5 %
MUCOUS THREADS #/AREA URNS LPF: ABNORMAL /LPF
NEUTROPHILS # BLD: 2.5 K/UL (ref 1.7–7.7)
NEUTROPHILS NFR BLD: 78.3 %
NITRITE UR QL STRIP.AUTO: NEGATIVE
PH UR STRIP.AUTO: 6 [PH] (ref 5–8)
PHOSPHATE SERPL-MCNC: 3.5 MG/DL (ref 2.5–4.9)
PLATELET # BLD AUTO: 168 K/UL (ref 135–450)
PMV BLD AUTO: 6.8 FL (ref 5–10.5)
POTASSIUM SERPL-SCNC: 4.1 MMOL/L (ref 3.5–5.1)
PROT UR STRIP.AUTO-MCNC: ABNORMAL MG/DL
RBC # BLD AUTO: 4.32 M/UL (ref 4.2–5.9)
RBC #/AREA URNS HPF: ABNORMAL /HPF (ref 0–4)
SODIUM SERPL-SCNC: 142 MMOL/L (ref 136–145)
SP GR UR STRIP.AUTO: >=1.03 (ref 1–1.03)
UA DIPSTICK W REFLEX MICRO PNL UR: YES
URN SPEC COLLECT METH UR: ABNORMAL
UROBILINOGEN UR STRIP-ACNC: 0.2 E.U./DL
WBC # BLD AUTO: 3.2 K/UL (ref 4–11)
WBC #/AREA URNS HPF: ABNORMAL /HPF (ref 0–5)

## 2024-07-22 PROCEDURE — 81001 URINALYSIS AUTO W/SCOPE: CPT

## 2024-07-22 PROCEDURE — 80069 RENAL FUNCTION PANEL: CPT

## 2024-07-22 PROCEDURE — 99284 EMERGENCY DEPT VISIT MOD MDM: CPT

## 2024-07-22 PROCEDURE — 36415 COLL VENOUS BLD VENIPUNCTURE: CPT

## 2024-07-22 PROCEDURE — 70450 CT HEAD/BRAIN W/O DYE: CPT

## 2024-07-22 PROCEDURE — 83735 ASSAY OF MAGNESIUM: CPT

## 2024-07-22 PROCEDURE — 85025 COMPLETE CBC W/AUTO DIFF WBC: CPT

## 2024-07-22 ASSESSMENT — ENCOUNTER SYMPTOMS
WHEEZING: 0
COUGH: 0
SHORTNESS OF BREATH: 0
CHEST TIGHTNESS: 0
STRIDOR: 0

## 2024-07-22 NOTE — ED NOTES
Pt discharged with copy of AVS. Pt verbalizes understanding of discharge instructions. Pt d/c'd home with spouse      Markus Lomeli RN  07/22/24 1944

## 2024-07-22 NOTE — ED PROVIDER NOTES
ED Attending Attestation Note     Date of evaluation: 7/22/2024    This patient was seen by the resident.  I have seen and examined the patient, agree with the workup, evaluation, management and diagnosis. The care plan has been discussed.  I have reviewed the ECG and concur with the resident's interpretation.      Briefly, Erwin Doss is a 54 y.o. male with a PMH inclusive of glioblastoma s/p resection with recurrence, on chemo and HTN who presents for evaluation of seizure. Has been having seizures. Had a seizure a couple of days ago. Called his Neurologist who told him to come to the ED.     Notable exam findings include normal mental status, ambulatory    Assessment/ Medical Decision Making:     Continue workup demonstrates stable head CT and no electrolyte derangements that would account for increasing seizure frequency.  Case was discussed with neurology who advised that uncontrolled seizures on his regimen will be an indication for admission.  I discussed this with patient and his wife at the bedside however, because his malignancy is thought to be the cause of his seizures, stable for discharge to home and to follow-up with his team as an outpatient, which I think is completely reasonable.  He is at his neurological baseline, and his wife works from home.  They are confident that patient will be safe at home and they will follow-up as an outpatient.  They will return for any worsening symptoms or other concerns.     Jt Gallardo MD  07/23/24 6419    
is a 54 y.o. male with recurrence and worsening of glioblastoma currently getting chemo with Dr. Ayala and since diagnosis 7/20/2023 has had intractable seizures. He most recently had MRI performed on 7/2/24 which showed signs of worsening progression of malignancy in to the temporal lobe He comes in today after having a seizure on Saturday and hitting his head by the sink in his bathroom.  Denies any loss of consciousness loss sensation weakness vision changes or worsening headache. He states his seziures are usually once to twice a month and a light twitch, this was more violent in nature. He does take keppra 2 gram BID and vimpat 200mg BID and states he is compliant.    On exam he is alert and oriented x 4, sitting up comfortably in bed breathing on room air no distress.  He is neurologically intact with no vision changes no weakness no loss sensation.  Obatined basic labs. No sign of UTI WBC 3.2. rfp was wnl with sodium of 142 and glucose of 106.  CT head w/o contrast showed  INTRACRANIAL HEMORRHAGE: There is linear hyperdensity in the right temporal lobe in the area of atrophy and encephalomalacia beneath the craniotomy site. This is unchanged when compared to the exam of 5/10/2024. No other definite hemorrhage is  visualized.  Impression: The appearance of the right temporal lobe beneath the craniotomy site is similar to the prior CT scan. There has been no significant change in the appearance.    Discussed case with neurology here, ELSY Escobedo, who agreed with work up and would recommend admission to hospital due to increased intensity of seizures and possibly not being well controlled on current regiment.    We discussed the results with the patient and wife at bedside and recommendation to be admitted to hospital. They stated that the seizures have been caused by the tumor. They have an upcoming meeting with their medical providers in the coming week (oncologist and neurologist) and would prefer to go home

## 2024-07-22 NOTE — DISCHARGE INSTRUCTIONS
1) You hit your head after having a seizure. There was no sign of increased bleed and your neurologic exam was at baseline.  2) Please avoid driving, swimming or doing any activity that may harm yourself or others if you were to have a seizure till you follow up with your neurologist and go according to their reccomendation.  3) Follow up with your neurology and oncology team  4) Follow up with your oncology team  5) if you have worsening neurologic symptoms or worsening headache please come to the emergency department

## 2024-08-13 ENCOUNTER — APPOINTMENT (OUTPATIENT)
Dept: GENERAL RADIOLOGY | Age: 54
End: 2024-08-13
Payer: COMMERCIAL

## 2024-08-13 ENCOUNTER — APPOINTMENT (OUTPATIENT)
Dept: CT IMAGING | Age: 54
End: 2024-08-13
Payer: COMMERCIAL

## 2024-08-13 ENCOUNTER — HOSPITAL ENCOUNTER (EMERGENCY)
Age: 54
Discharge: HOME | End: 2024-08-13
Attending: EMERGENCY MEDICINE
Payer: COMMERCIAL

## 2024-08-13 VITALS
DIASTOLIC BLOOD PRESSURE: 83 MMHG | WEIGHT: 185.6 LBS | BODY MASS INDEX: 26.57 KG/M2 | HEIGHT: 70 IN | SYSTOLIC BLOOD PRESSURE: 119 MMHG | TEMPERATURE: 97.8 F | RESPIRATION RATE: 12 BRPM | OXYGEN SATURATION: 96 % | HEART RATE: 63 BPM

## 2024-08-13 DIAGNOSIS — G40.919 BREAKTHROUGH SEIZURE (HCC): Primary | ICD-10-CM

## 2024-08-13 LAB
ANION GAP SERPL CALCULATED.3IONS-SCNC: 11 MMOL/L (ref 3–16)
BASE EXCESS BLDV CALC-SCNC: -6.5 MMOL/L (ref -2–3)
BASOPHILS # BLD: 0.1 K/UL (ref 0–0.2)
BASOPHILS NFR BLD: 2 %
BILIRUB UR QL STRIP.AUTO: NEGATIVE
BUN SERPL-MCNC: 9 MG/DL (ref 7–20)
CALCIUM SERPL-MCNC: 9.2 MG/DL (ref 8.3–10.6)
CHLORIDE SERPL-SCNC: 104 MMOL/L (ref 99–110)
CLARITY UR: CLEAR
CO2 BLDV-SCNC: 24 MMOL/L
CO2 SERPL-SCNC: 24 MMOL/L (ref 21–32)
COHGB MFR BLDV: 3.6 % (ref 0–1.5)
COLOR UR: YELLOW
CREAT SERPL-MCNC: 1 MG/DL (ref 0.9–1.3)
DEPRECATED RDW RBC AUTO: 14.7 % (ref 12.4–15.4)
DO-HGB MFR BLDV: 19.1 %
EOSINOPHIL # BLD: 0.2 K/UL (ref 0–0.6)
EOSINOPHIL NFR BLD: 5.6 %
GFR SERPLBLD CREATININE-BSD FMLA CKD-EPI: 90 ML/MIN/{1.73_M2}
GLUCOSE SERPL-MCNC: 103 MG/DL (ref 70–99)
GLUCOSE UR STRIP.AUTO-MCNC: NEGATIVE MG/DL
HCO3 BLDV-SCNC: 22.5 MMOL/L (ref 24–28)
HCT VFR BLD AUTO: 41.3 % (ref 40.5–52.5)
HGB BLD-MCNC: 14.6 G/DL (ref 13.5–17.5)
HGB UR QL STRIP.AUTO: NEGATIVE
KETONES UR STRIP.AUTO-MCNC: NEGATIVE MG/DL
LEUKOCYTE ESTERASE UR QL STRIP.AUTO: NEGATIVE
LYMPHOCYTES # BLD: 0.2 K/UL (ref 1–5.1)
LYMPHOCYTES NFR BLD: 7.1 %
MAGNESIUM SERPL-MCNC: 2.1 MG/DL (ref 1.8–2.4)
MCH RBC QN AUTO: 33.5 PG (ref 26–34)
MCHC RBC AUTO-ENTMCNC: 35.4 G/DL (ref 31–36)
MCV RBC AUTO: 94.6 FL (ref 80–100)
METHGB MFR BLDV: 0.3 % (ref 0–1.5)
MONOCYTES # BLD: 0.4 K/UL (ref 0–1.3)
MONOCYTES NFR BLD: 13 %
NEUTROPHILS # BLD: 2 K/UL (ref 1.7–7.7)
NEUTROPHILS NFR BLD: 72.3 %
NITRITE UR QL STRIP.AUTO: NEGATIVE
PCO2 BLDV: 57.5 MMHG (ref 41–51)
PH BLDV: 7.2 [PH] (ref 7.35–7.45)
PH UR STRIP.AUTO: 6.5 [PH] (ref 5–8)
PLATELET # BLD AUTO: 190 K/UL (ref 135–450)
PMV BLD AUTO: 6.9 FL (ref 5–10.5)
PO2 BLDV: 49.8 MMHG (ref 25–40)
POTASSIUM SERPL-SCNC: 3.5 MMOL/L (ref 3.5–5.1)
PROT UR STRIP.AUTO-MCNC: NEGATIVE MG/DL
RBC # BLD AUTO: 4.36 M/UL (ref 4.2–5.9)
SAO2 % BLDV: 80 %
SODIUM SERPL-SCNC: 139 MMOL/L (ref 136–145)
SP GR UR STRIP.AUTO: 1.01 (ref 1–1.03)
UA COMPLETE W REFLEX CULTURE PNL UR: NORMAL
UA DIPSTICK W REFLEX MICRO PNL UR: NORMAL
URN SPEC COLLECT METH UR: NORMAL
UROBILINOGEN UR STRIP-ACNC: 0.2 E.U./DL
WBC # BLD AUTO: 2.7 K/UL (ref 4–11)

## 2024-08-13 PROCEDURE — 82803 BLOOD GASES ANY COMBINATION: CPT

## 2024-08-13 PROCEDURE — 80048 BASIC METABOLIC PNL TOTAL CA: CPT

## 2024-08-13 PROCEDURE — 85025 COMPLETE CBC W/AUTO DIFF WBC: CPT

## 2024-08-13 PROCEDURE — 70450 CT HEAD/BRAIN W/O DYE: CPT

## 2024-08-13 PROCEDURE — 81003 URINALYSIS AUTO W/O SCOPE: CPT

## 2024-08-13 PROCEDURE — 71045 X-RAY EXAM CHEST 1 VIEW: CPT

## 2024-08-13 PROCEDURE — 83735 ASSAY OF MAGNESIUM: CPT

## 2024-08-13 PROCEDURE — 99284 EMERGENCY DEPT VISIT MOD MDM: CPT

## 2024-08-13 ASSESSMENT — PAIN DESCRIPTION - LOCATION: LOCATION: HEAD

## 2024-08-13 ASSESSMENT — PAIN DESCRIPTION - PAIN TYPE: TYPE: ACUTE PAIN

## 2024-08-13 ASSESSMENT — PAIN DESCRIPTION - FREQUENCY: FREQUENCY: CONTINUOUS

## 2024-08-13 ASSESSMENT — PAIN DESCRIPTION - DESCRIPTORS: DESCRIPTORS: ACHING

## 2024-08-13 ASSESSMENT — PAIN SCALES - GENERAL: PAINLEVEL_OUTOF10: 8

## 2024-08-13 ASSESSMENT — PAIN - FUNCTIONAL ASSESSMENT: PAIN_FUNCTIONAL_ASSESSMENT: 0-10

## 2024-08-13 NOTE — ED PROVIDER NOTES
questions.  Follows commands briskly with his right upper and lower extremity responses with the left upper and lower or more sluggish and a bit more weak.     Psychiatric:  Normal mood.  Behavior appropriate.      Diagnostic Results         RADIOLOGY:  CT Head W/O Contrast   Final Result   Stable CT examination of the brain.      Electronically signed by Grabiel Fung      XR CHEST PORTABLE   Final Result   Bibasilar atelectasis, similar to prior.      Electronically signed by Javier Daugherty          LABS:   Results for orders placed or performed during the hospital encounter of 08/13/24   CBC with Auto Differential   Result Value Ref Range    WBC 2.7 (L) 4.0 - 11.0 K/uL    RBC 4.36 4.20 - 5.90 M/uL    Hemoglobin 14.6 13.5 - 17.5 g/dL    Hematocrit 41.3 40.5 - 52.5 %    MCV 94.6 80.0 - 100.0 fL    MCH 33.5 26.0 - 34.0 pg    MCHC 35.4 31.0 - 36.0 g/dL    RDW 14.7 12.4 - 15.4 %    Platelets 190 135 - 450 K/uL    MPV 6.9 5.0 - 10.5 fL    Neutrophils % 72.3 %    Lymphocytes % 7.1 %    Monocytes % 13.0 %    Eosinophils % 5.6 %    Basophils % 2.0 %    Neutrophils Absolute 2.0 1.7 - 7.7 K/uL    Lymphocytes Absolute 0.2 (L) 1.0 - 5.1 K/uL    Monocytes Absolute 0.4 0.0 - 1.3 K/uL    Eosinophils Absolute 0.2 0.0 - 0.6 K/uL    Basophils Absolute 0.1 0.0 - 0.2 K/uL   BMP w/ Reflex to MG   Result Value Ref Range    Sodium 139 136 - 145 mmol/L    Potassium reflex Magnesium 3.5 3.5 - 5.1 mmol/L    Chloride 104 99 - 110 mmol/L    CO2 24 21 - 32 mmol/L    Anion Gap 11 3 - 16    Glucose 103 (H) 70 - 99 mg/dL    BUN 9 7 - 20 mg/dL    Creatinine 1.0 0.9 - 1.3 mg/dL    Est, Glom Filt Rate 90 >60    Calcium 9.2 8.3 - 10.6 mg/dL   Blood Gas, Venous   Result Value Ref Range    pH, Humberto 7.200 (L) 7.350 - 7.450    pCO2, Humberto 57.5 (H) 41.0 - 51.0 mmHg    PO2, Humberto 49.8 (H) 25.0 - 40.0 mmHg    HCO3, Venous 22.5 (L) 24.0 - 28.0 mmol/L    Base Excess, Humberto -6.5 (L) -2.0 - 3.0 mmol/L    O2 Sat, Humberto 80 Not established %    Carboxyhemoglobin 3.6

## 2024-08-19 RX ORDER — METHOCARBAMOL 500 MG/1
500 TABLET, FILM COATED ORAL 4 TIMES DAILY
COMMUNITY

## 2024-08-19 NOTE — PROGRESS NOTES
8/19/2024 1159 AM:    Brain Tumor Program Reminders: Preoperative bathing instructions reviewed with patient: Hibiclens from neck down, Dial antibacterial soap for face and head. Reminded patient that hair may be cut or shaved to allow access to area of scalp.  For any questions prior to day of surgery, please contact [facility], Cranial Care Navigator RN at [phone].  Informed patient that on day of surgery, Your Guide To Neurosurgery booklet may be provided for additional resources related to recovery for patient and family if not provided at [place] office appointment before surgery.

## 2024-08-19 NOTE — PROGRESS NOTES
[facility] PRE-SURGICAL TESTING INSTRUCTIONS                      PRIOR TO PROCEDURE DATE:    1. PLEASE FOLLOW ANY INSTRUCTIONS GIVEN TO YOU PER YOUR SURGEON.      2. Arrange for someone to drive you home and be with you for the first 24 hours after discharge for your safety after your procedure for which you received sedation. Ensure it is someone we can share information with regarding your discharge.     NOTE: At this time ONLY 2 ADULTS may accompany you. NO CHILDREN UNDER AGE OF 16.    One person ENCOURAGED to stay at hospital entire time if outpatient surgery      3. You must contact your surgeon for instructions IF:  You are taking any blood thinners, aspirin, anti-inflammatory or vitamins.   Contact your ordering physician/surgeon for medication instructions as soon as possible, especially if taking blood thinners, aspirin, heart, or diabetic medication. STOP SUPPLEMENTS/VITAMINS/NON-STEROIDAL ANTI-INFLAMMATORY MEDICATION 7 DAYS PRIOR TO PROCEDURE.  There is a change in your physical condition such as a cold, fever, rash, cuts, sores, or any other infection, especially near your surgical site.    4. Do not drink alcohol the day before or day of your procedure.  Do not use any recreational marijuana at least 24 hours or street drugs (heroin, cocaine) at minimum 5 days prior to your procedure.     5. A Pre-Surgical History and Physical MUST be completed WITHIN 30 DAYS OR LESS prior to your procedure.by your Physician or an Urgent Care        THE DAY OF YOUR PROCEDURE:  1.  Follow instructions for ARRIVAL TIME as DIRECTED BY YOUR SURGEON. [address] [address]     2. Enter the MAIN entrance from [facility] and follow the signs to the free Parking Garage or  Parking (offered free of charge 7 am-5pm).      3. Enter the Main Entrance of the hospital (do not enter from the lower level of the parking garage). Upon entrance, check in with the  at  cases for your glasses, contacts, dentures, or hearing aids to protect them while you are in surgery.      9. If you use a CPAP, please bring it with you on the day of your procedure.    10. If you use oxygen at home, please bring your oxygen tank with you to hospital..     11. We recommend that valuable personal belongings such as cash, cell phones, e-tablets, or jewelry, be left at home during your stay. The hospital will not be responsible for valuables that are not secured in the hospital safe. However, if your insurance requires a co-pay, you may want to bring a method of payment, i.e., Check or credit card, if you wish to pay your co-pay the day of surgery.      12. If you are to stay overnight, you may bring a bag with personal items. Please have any large items you may need brought in by your family after your arrival to your hospital room.    13. If you have a Living Will or Durable Power of , please bring a copy on the day of your procedure.     How we keep you safe and work to prevent surgical site infections:   1. Health care workers should always check your ID bracelet to verify your name and birth date. You will be asked many times to state your name, date of birth, and allergies.    2. Health care workers should always clean their hands with soap or alcohol gel before providing care to you. It is okay to ask anyone if they cleaned their hands before they touch you.    3. You will be actively involved in verifying the type of procedure you are having and ensuring the correct surgical site. This will be confirmed multiple times prior to your procedure. Do NOT compa your surgery site UNLESS instructed to by your surgeon.     4. When you are in the operating room, your surgical site will be cleansed with a special soap, and in most cases, you will be given an antibiotic before the surgery begins.      What to expect AFTER your procedure?  1. Immediately following your procedure, your will be taken  to the PACU for the first phase of your recovery.  Your nurse will help you recover from any potential side effects of anesthesia, such as extreme drowsiness, changes in your vital signs or breathing patterns. Nausea, headache, muscle aches, or sore throat may also occur after anesthesia.  Your nurse will help you manage these potential side effects.    2. For comfort and safety, arrange to have someone at home with you for the first 24 hours after discharge.    3. You and your family will be given written instructions about your diet, activity, dressing care, medications, and return visits.     4. Once at home, should issues with nausea, pain, or bleeding occur, or should you notice any signs of infection, you should call your surgeon.    5. Always clean your hands before and after caring for your wound. Do not let your family touch your surgery site without cleaning their hands.     6. Narcotic pain medications can cause significant constipation.  You may want to add a stool softener to your postoperative medication schedule or speak to your surgeon on how best to manage this SIDE EFFECT.    SPECIAL INSTRUCTIONS: Brain Tumor Program Reminders: Preoperative bathing instructions: Hibiclens from neck down,  antibacterial soap for face and head. hair may be cut or shaved to allow access to area of scalp-DO NOT CLIP/SHAVE HAIR 72 hours prior to procedure.. For any questions prior to day of surgery, please contact [facility], Cranial Care Navigator RN at [phone].  On day of surgery, Your Guide To Neurosurgery booklet may be provided for additional resources related to recovery for patient and family if not provided at [place] office appointment before surgery.       Thank you for allowing us to care for you.  We strive to exceed your expectations in the delivery of care and service provided to you and your family.     If you need to contact the Pre-Admission Testing staff for any reason, please call

## 2024-08-19 NOTE — PROGRESS NOTES
8/19/2024 1015 AM:    SPOKE TO LICHA [phone] TO NOTIFY NEED GLEOLAN ORDERED ENTERED, SPOKE TO MOUSTAPHA IN [facility] TO NOTIFY PT TO RECEIVE GLEOLAN DOS, KATHRIN INFORMATION SHEET  placed on chart for DOS, AND EMAIL sent  TO [facility] PAT COORDINATOR/TS     CALLED [facility] FOR EKG TRACING [phone], H&P IN  8/16/2024 [facility], LABS IN [facility] 8/13/2024 FROM ER VISIT, NEEDS PT/INR/PTT/TS     8/19/2024 1033 AM:  SPOKE TO WIFE WHO REQUESTED RETURN CALL IN 1 HOUR/TS    8/19/2024 1159 PM:  TI COMPLETED W/JUDY, HAVING LABS DONE TODAY/T/S

## 2024-08-20 ENCOUNTER — HOSPITAL ENCOUNTER (OUTPATIENT)
Dept: MRI IMAGING | Age: 54
Discharge: HOME | End: 2024-08-20
Attending: NEUROLOGICAL SURGERY
Payer: COMMERCIAL

## 2024-08-20 DIAGNOSIS — C71.9 GBM (GLIOBLASTOMA MULTIFORME) (HCC): ICD-10-CM

## 2024-08-20 PROCEDURE — 6360000004 HC RX CONTRAST MEDICATION: Performed by: NEUROLOGICAL SURGERY

## 2024-08-20 PROCEDURE — 70552 MRI BRAIN STEM W/DYE: CPT

## 2024-08-20 PROCEDURE — A9576 INJ PROHANCE MULTIPACK: HCPCS | Performed by: NEUROLOGICAL SURGERY

## 2024-08-20 RX ADMIN — GADOTERIDOL 15 ML: 279.3 INJECTION, SOLUTION INTRAVENOUS at 15:54

## 2024-08-22 ENCOUNTER — HOSPITAL ENCOUNTER (INPATIENT)
Age: 54
LOS: 1 days | Discharge: HOME | DRG: 023 | End: 2024-08-23
Attending: STUDENT IN AN ORGANIZED HEALTH CARE EDUCATION/TRAINING PROGRAM | Admitting: STUDENT IN AN ORGANIZED HEALTH CARE EDUCATION/TRAINING PROGRAM
Payer: COMMERCIAL

## 2024-08-22 ENCOUNTER — APPOINTMENT (OUTPATIENT)
Dept: CT IMAGING | Age: 54
DRG: 023 | End: 2024-08-22
Attending: STUDENT IN AN ORGANIZED HEALTH CARE EDUCATION/TRAINING PROGRAM
Payer: COMMERCIAL

## 2024-08-22 ENCOUNTER — ANESTHESIA EVENT (OUTPATIENT)
Dept: OPERATING ROOM | Age: 54
End: 2024-08-22
Payer: COMMERCIAL

## 2024-08-22 ENCOUNTER — ANESTHESIA (OUTPATIENT)
Dept: OPERATING ROOM | Age: 54
End: 2024-08-22
Payer: COMMERCIAL

## 2024-08-22 DIAGNOSIS — C71.9 GBM (GLIOBLASTOMA MULTIFORME) (HCC): Primary | ICD-10-CM

## 2024-08-22 DIAGNOSIS — C71.9 GLIOBLASTOMA MULTIFORME OF BRAIN (HCC): ICD-10-CM

## 2024-08-22 LAB
ABO + RH BLD: NORMAL
APTT BLD: 23.8 SEC (ref 22.1–36.4)
BLD GP AB SCN SERPL QL: NORMAL
GLUCOSE BLD-MCNC: 132 MG/DL (ref 70–99)
INR PPP: 1.02 (ref 0.85–1.15)
PERFORMED ON: ABNORMAL
PROTHROMBIN TIME: 13.6 SEC (ref 11.9–14.9)

## 2024-08-22 PROCEDURE — 94150 VITAL CAPACITY TEST: CPT

## 2024-08-22 PROCEDURE — 2580000003 HC RX 258: Performed by: NEUROLOGICAL SURGERY

## 2024-08-22 PROCEDURE — 7100000000 HC PACU RECOVERY - FIRST 15 MIN: Performed by: STUDENT IN AN ORGANIZED HEALTH CARE EDUCATION/TRAINING PROGRAM

## 2024-08-22 PROCEDURE — 88331 PATH CONSLTJ SURG 1 BLK 1SPC: CPT

## 2024-08-22 PROCEDURE — 2780000010 HC IMPLANT OTHER: Performed by: STUDENT IN AN ORGANIZED HEALTH CARE EDUCATION/TRAINING PROGRAM

## 2024-08-22 PROCEDURE — 6370000000 HC RX 637 (ALT 250 FOR IP): Performed by: STUDENT IN AN ORGANIZED HEALTH CARE EDUCATION/TRAINING PROGRAM

## 2024-08-22 PROCEDURE — 86901 BLOOD TYPING SEROLOGIC RH(D): CPT

## 2024-08-22 PROCEDURE — 3700000000 HC ANESTHESIA ATTENDED CARE: Performed by: STUDENT IN AN ORGANIZED HEALTH CARE EDUCATION/TRAINING PROGRAM

## 2024-08-22 PROCEDURE — 85730 THROMBOPLASTIN TIME PARTIAL: CPT

## 2024-08-22 PROCEDURE — 2580000003 HC RX 258: Performed by: ANESTHESIOLOGY

## 2024-08-22 PROCEDURE — C2642 BRACHYTX, STRANDED, C-131: HCPCS | Performed by: STUDENT IN AN ORGANIZED HEALTH CARE EDUCATION/TRAINING PROGRAM

## 2024-08-22 PROCEDURE — 86900 BLOOD TYPING SEROLOGIC ABO: CPT

## 2024-08-22 PROCEDURE — 2500000003 HC RX 250 WO HCPCS: Performed by: STUDENT IN AN ORGANIZED HEALTH CARE EDUCATION/TRAINING PROGRAM

## 2024-08-22 PROCEDURE — 2000000000 HC ICU R&B

## 2024-08-22 PROCEDURE — 6360000002 HC RX W HCPCS: Performed by: NEUROLOGICAL SURGERY

## 2024-08-22 PROCEDURE — 86850 RBC ANTIBODY SCREEN: CPT

## 2024-08-22 PROCEDURE — 00H004Z INSERTION OF RADIOACTIVE ELEMENT, CESIUM-131 COLLAGEN IMPLANT INTO BRAIN, OPEN APPROACH: ICD-10-PCS | Performed by: STUDENT IN AN ORGANIZED HEALTH CARE EDUCATION/TRAINING PROGRAM

## 2024-08-22 PROCEDURE — 3600000014 HC SURGERY LEVEL 4 ADDTL 15MIN: Performed by: STUDENT IN AN ORGANIZED HEALTH CARE EDUCATION/TRAINING PROGRAM

## 2024-08-22 PROCEDURE — 7100000001 HC PACU RECOVERY - ADDTL 15 MIN: Performed by: STUDENT IN AN ORGANIZED HEALTH CARE EDUCATION/TRAINING PROGRAM

## 2024-08-22 PROCEDURE — 6360000002 HC RX W HCPCS

## 2024-08-22 PROCEDURE — 70450 CT HEAD/BRAIN W/O DYE: CPT

## 2024-08-22 PROCEDURE — 99291 CRITICAL CARE FIRST HOUR: CPT

## 2024-08-22 PROCEDURE — 2500000003 HC RX 250 WO HCPCS: Performed by: NURSE PRACTITIONER

## 2024-08-22 PROCEDURE — 8E090EM FLUORESCENCE GUIDED PROCEDURE OF HEAD AND NECK REGION USING AMINOLEVULINIC ACID, OPEN APPROACH: ICD-10-PCS | Performed by: STUDENT IN AN ORGANIZED HEALTH CARE EDUCATION/TRAINING PROGRAM

## 2024-08-22 PROCEDURE — 2709999900 HC NON-CHARGEABLE SUPPLY: Performed by: STUDENT IN AN ORGANIZED HEALTH CARE EDUCATION/TRAINING PROGRAM

## 2024-08-22 PROCEDURE — 2580000003 HC RX 258

## 2024-08-22 PROCEDURE — 2720000010 HC SURG SUPPLY STERILE: Performed by: STUDENT IN AN ORGANIZED HEALTH CARE EDUCATION/TRAINING PROGRAM

## 2024-08-22 PROCEDURE — 85610 PROTHROMBIN TIME: CPT

## 2024-08-22 PROCEDURE — C1713 ANCHOR/SCREW BN/BN,TIS/BN: HCPCS | Performed by: STUDENT IN AN ORGANIZED HEALTH CARE EDUCATION/TRAINING PROGRAM

## 2024-08-22 PROCEDURE — 4A11X4G MONITORING OF PERIPHERAL NERVOUS ELECTRICAL ACTIVITY, INTRAOPERATIVE, EXTERNAL APPROACH: ICD-10-PCS | Performed by: STUDENT IN AN ORGANIZED HEALTH CARE EDUCATION/TRAINING PROGRAM

## 2024-08-22 PROCEDURE — 6370000000 HC RX 637 (ALT 250 FOR IP): Performed by: NEUROLOGICAL SURGERY

## 2024-08-22 PROCEDURE — 03HB33Z INSERTION OF INFUSION DEVICE INTO RIGHT RADIAL ARTERY, PERCUTANEOUS APPROACH: ICD-10-PCS | Performed by: ANESTHESIOLOGY

## 2024-08-22 PROCEDURE — 3600000004 HC SURGERY LEVEL 4 BASE: Performed by: STUDENT IN AN ORGANIZED HEALTH CARE EDUCATION/TRAINING PROGRAM

## 2024-08-22 PROCEDURE — 3700000001 HC ADD 15 MINUTES (ANESTHESIA): Performed by: STUDENT IN AN ORGANIZED HEALTH CARE EDUCATION/TRAINING PROGRAM

## 2024-08-22 PROCEDURE — 2580000003 HC RX 258: Performed by: STUDENT IN AN ORGANIZED HEALTH CARE EDUCATION/TRAINING PROGRAM

## 2024-08-22 PROCEDURE — 8E09XBZ COMPUTER ASSISTED PROCEDURE OF HEAD AND NECK REGION: ICD-10-PCS | Performed by: STUDENT IN AN ORGANIZED HEALTH CARE EDUCATION/TRAINING PROGRAM

## 2024-08-22 PROCEDURE — 6360000002 HC RX W HCPCS: Performed by: STUDENT IN AN ORGANIZED HEALTH CARE EDUCATION/TRAINING PROGRAM

## 2024-08-22 PROCEDURE — 00B70ZZ EXCISION OF CEREBRAL HEMISPHERE, OPEN APPROACH: ICD-10-PCS | Performed by: STUDENT IN AN ORGANIZED HEALTH CARE EDUCATION/TRAINING PROGRAM

## 2024-08-22 PROCEDURE — 2500000003 HC RX 250 WO HCPCS

## 2024-08-22 PROCEDURE — 4A10X4Z MONITORING OF CENTRAL NERVOUS ELECTRICAL ACTIVITY, EXTERNAL APPROACH: ICD-10-PCS | Performed by: STUDENT IN AN ORGANIZED HEALTH CARE EDUCATION/TRAINING PROGRAM

## 2024-08-22 DEVICE — IMPLANTABLE DEVICE: Type: IMPLANTABLE DEVICE | Status: FUNCTIONAL

## 2024-08-22 DEVICE — SCREW, AXS, SELF-DRILLING
Type: IMPLANTABLE DEVICE | Status: FUNCTIONAL
Brand: UNIVERSAL NEURO 3

## 2024-08-22 DEVICE — DURAGEN® PLUS DURAL REGENERATION MATRIX , 4 IN X 5 IN
Type: IMPLANTABLE DEVICE | Site: BRAIN | Status: FUNCTIONAL
Brand: DURAGEN® PLUS

## 2024-08-22 RX ORDER — BISACODYL 10 MG
10 SUPPOSITORY, RECTAL RECTAL DAILY PRN
Status: DISCONTINUED | OUTPATIENT
Start: 2024-08-22 | End: 2024-08-23 | Stop reason: HOSPADM

## 2024-08-22 RX ORDER — GINSENG 100 MG
CAPSULE ORAL PRN
Status: DISCONTINUED | OUTPATIENT
Start: 2024-08-22 | End: 2024-08-22 | Stop reason: ALTCHOICE

## 2024-08-22 RX ORDER — SODIUM CHLORIDE 9 MG/ML
INJECTION, SOLUTION INTRAVENOUS PRN
Status: DISCONTINUED | OUTPATIENT
Start: 2024-08-22 | End: 2024-08-22

## 2024-08-22 RX ORDER — METHOCARBAMOL 750 MG/1
750 TABLET, FILM COATED ORAL EVERY 6 HOURS PRN
Status: DISCONTINUED | OUTPATIENT
Start: 2024-08-23 | End: 2024-08-23 | Stop reason: HOSPADM

## 2024-08-22 RX ORDER — LABETALOL HYDROCHLORIDE 5 MG/ML
10 INJECTION, SOLUTION INTRAVENOUS
Status: DISCONTINUED | OUTPATIENT
Start: 2024-08-22 | End: 2024-08-22

## 2024-08-22 RX ORDER — ONDANSETRON 2 MG/ML
4 INJECTION INTRAMUSCULAR; INTRAVENOUS EVERY 6 HOURS PRN
Status: DISCONTINUED | OUTPATIENT
Start: 2024-08-22 | End: 2024-08-23 | Stop reason: HOSPADM

## 2024-08-22 RX ORDER — DEXAMETHASONE 4 MG/1
4 TABLET ORAL EVERY 12 HOURS SCHEDULED
Status: DISCONTINUED | OUTPATIENT
Start: 2024-08-26 | End: 2024-08-23 | Stop reason: HOSPADM

## 2024-08-22 RX ORDER — EPHEDRINE SULFATE 50 MG/ML
INJECTION INTRAVENOUS PRN
Status: DISCONTINUED | OUTPATIENT
Start: 2024-08-22 | End: 2024-08-22 | Stop reason: SDUPTHER

## 2024-08-22 RX ORDER — DEXAMETHASONE 4 MG/1
4 TABLET ORAL EVERY 6 HOURS SCHEDULED
Status: DISCONTINUED | OUTPATIENT
Start: 2024-08-22 | End: 2024-08-23 | Stop reason: HOSPADM

## 2024-08-22 RX ORDER — SODIUM CHLORIDE, SODIUM LACTATE, POTASSIUM CHLORIDE, CALCIUM CHLORIDE 600; 310; 30; 20 MG/100ML; MG/100ML; MG/100ML; MG/100ML
INJECTION, SOLUTION INTRAVENOUS CONTINUOUS
Status: DISCONTINUED | OUTPATIENT
Start: 2024-08-22 | End: 2024-08-22 | Stop reason: HOSPADM

## 2024-08-22 RX ORDER — LIDOCAINE HYDROCHLORIDE 20 MG/ML
INJECTION, SOLUTION INTRAVENOUS PRN
Status: DISCONTINUED | OUTPATIENT
Start: 2024-08-22 | End: 2024-08-22 | Stop reason: SDUPTHER

## 2024-08-22 RX ORDER — PROMETHAZINE HYDROCHLORIDE 25 MG/1
12.5 TABLET ORAL EVERY 6 HOURS PRN
Status: DISCONTINUED | OUTPATIENT
Start: 2024-08-22 | End: 2024-08-23 | Stop reason: HOSPADM

## 2024-08-22 RX ORDER — HEPARIN SODIUM 5000 [USP'U]/ML
5000 INJECTION, SOLUTION INTRAVENOUS; SUBCUTANEOUS EVERY 8 HOURS SCHEDULED
Status: DISCONTINUED | OUTPATIENT
Start: 2024-08-23 | End: 2024-08-23 | Stop reason: HOSPADM

## 2024-08-22 RX ORDER — METHOCARBAMOL 100 MG/ML
1000 INJECTION, SOLUTION INTRAMUSCULAR; INTRAVENOUS EVERY 8 HOURS
Status: COMPLETED | OUTPATIENT
Start: 2024-08-22 | End: 2024-08-23

## 2024-08-22 RX ORDER — BUPIVACAINE HYDROCHLORIDE AND EPINEPHRINE 5; 5 MG/ML; UG/ML
INJECTION, SOLUTION EPIDURAL; INTRACAUDAL; PERINEURAL PRN
Status: DISCONTINUED | OUTPATIENT
Start: 2024-08-22 | End: 2024-08-22 | Stop reason: HOSPADM

## 2024-08-22 RX ORDER — PROPOFOL 10 MG/ML
INJECTION, EMULSION INTRAVENOUS PRN
Status: DISCONTINUED | OUTPATIENT
Start: 2024-08-22 | End: 2024-08-22 | Stop reason: SDUPTHER

## 2024-08-22 RX ORDER — ONDANSETRON 2 MG/ML
INJECTION INTRAMUSCULAR; INTRAVENOUS PRN
Status: DISCONTINUED | OUTPATIENT
Start: 2024-08-22 | End: 2024-08-22 | Stop reason: SDUPTHER

## 2024-08-22 RX ORDER — SODIUM CHLORIDE, SODIUM LACTATE, POTASSIUM CHLORIDE, CALCIUM CHLORIDE 600; 310; 30; 20 MG/100ML; MG/100ML; MG/100ML; MG/100ML
INJECTION, SOLUTION INTRAVENOUS CONTINUOUS PRN
Status: DISCONTINUED | OUTPATIENT
Start: 2024-08-22 | End: 2024-08-22 | Stop reason: SDUPTHER

## 2024-08-22 RX ORDER — DEXAMETHASONE SODIUM PHOSPHATE 4 MG/ML
INJECTION, SOLUTION INTRA-ARTICULAR; INTRALESIONAL; INTRAMUSCULAR; INTRAVENOUS; SOFT TISSUE PRN
Status: DISCONTINUED | OUTPATIENT
Start: 2024-08-22 | End: 2024-08-22 | Stop reason: SDUPTHER

## 2024-08-22 RX ORDER — LACOSAMIDE 50 MG/1
200 TABLET ORAL 2 TIMES DAILY
Status: DISCONTINUED | OUTPATIENT
Start: 2024-08-22 | End: 2024-08-23 | Stop reason: HOSPADM

## 2024-08-22 RX ORDER — HYDROMORPHONE HYDROCHLORIDE 1 MG/ML
1 INJECTION, SOLUTION INTRAMUSCULAR; INTRAVENOUS; SUBCUTANEOUS
Status: DISCONTINUED | OUTPATIENT
Start: 2024-08-22 | End: 2024-08-23 | Stop reason: HOSPADM

## 2024-08-22 RX ORDER — SODIUM CHLORIDE 0.9 % (FLUSH) 0.9 %
5-40 SYRINGE (ML) INJECTION PRN
Status: DISCONTINUED | OUTPATIENT
Start: 2024-08-22 | End: 2024-08-22

## 2024-08-22 RX ORDER — SENNA AND DOCUSATE SODIUM 50; 8.6 MG/1; MG/1
1 TABLET, FILM COATED ORAL 2 TIMES DAILY
Status: DISCONTINUED | OUTPATIENT
Start: 2024-08-22 | End: 2024-08-23 | Stop reason: HOSPADM

## 2024-08-22 RX ORDER — IPRATROPIUM BROMIDE AND ALBUTEROL SULFATE 2.5; .5 MG/3ML; MG/3ML
1 SOLUTION RESPIRATORY (INHALATION)
Status: DISCONTINUED | OUTPATIENT
Start: 2024-08-22 | End: 2024-08-22

## 2024-08-22 RX ORDER — DEXAMETHASONE 4 MG/1
4 TABLET ORAL EVERY 8 HOURS SCHEDULED
Status: DISCONTINUED | OUTPATIENT
Start: 2024-08-24 | End: 2024-08-23 | Stop reason: HOSPADM

## 2024-08-22 RX ORDER — SODIUM CHLORIDE 0.9 % (FLUSH) 0.9 %
5-40 SYRINGE (ML) INJECTION EVERY 12 HOURS SCHEDULED
Status: DISCONTINUED | OUTPATIENT
Start: 2024-08-22 | End: 2024-08-22

## 2024-08-22 RX ORDER — ONDANSETRON 2 MG/ML
4 INJECTION INTRAMUSCULAR; INTRAVENOUS
Status: DISCONTINUED | OUTPATIENT
Start: 2024-08-22 | End: 2024-08-22

## 2024-08-22 RX ORDER — LEVETIRACETAM 500 MG/1
2000 TABLET ORAL 2 TIMES DAILY
Status: DISCONTINUED | OUTPATIENT
Start: 2024-08-22 | End: 2024-08-23 | Stop reason: HOSPADM

## 2024-08-22 RX ORDER — LEVETIRACETAM 500 MG/5ML
1000 INJECTION, SOLUTION, CONCENTRATE INTRAVENOUS ONCE
Status: COMPLETED | OUTPATIENT
Start: 2024-08-22 | End: 2024-08-22

## 2024-08-22 RX ORDER — DIAZEPAM 5 MG
5 TABLET ORAL EVERY 6 HOURS PRN
Status: DISCONTINUED | OUTPATIENT
Start: 2024-08-22 | End: 2024-08-23 | Stop reason: HOSPADM

## 2024-08-22 RX ORDER — SODIUM CHLORIDE 9 MG/ML
INJECTION, SOLUTION INTRAVENOUS CONTINUOUS
Status: DISCONTINUED | OUTPATIENT
Start: 2024-08-22 | End: 2024-08-23 | Stop reason: HOSPADM

## 2024-08-22 RX ORDER — INSULIN LISPRO 100 [IU]/ML
0-8 INJECTION, SOLUTION INTRAVENOUS; SUBCUTANEOUS
Status: DISCONTINUED | OUTPATIENT
Start: 2024-08-22 | End: 2024-08-23 | Stop reason: HOSPADM

## 2024-08-22 RX ORDER — SODIUM CHLORIDE 9 MG/ML
INJECTION, SOLUTION INTRAVENOUS PRN
Status: DISCONTINUED | OUTPATIENT
Start: 2024-08-22 | End: 2024-08-23 | Stop reason: HOSPADM

## 2024-08-22 RX ORDER — OXYCODONE HYDROCHLORIDE 5 MG/1
10 TABLET ORAL EVERY 4 HOURS PRN
Status: DISCONTINUED | OUTPATIENT
Start: 2024-08-22 | End: 2024-08-23 | Stop reason: HOSPADM

## 2024-08-22 RX ORDER — POLYETHYLENE GLYCOL 3350 17 G/17G
17 POWDER, FOR SOLUTION ORAL DAILY
Status: DISCONTINUED | OUTPATIENT
Start: 2024-08-22 | End: 2024-08-23 | Stop reason: HOSPADM

## 2024-08-22 RX ORDER — ACETAMINOPHEN 325 MG/1
650 TABLET ORAL
Status: DISCONTINUED | OUTPATIENT
Start: 2024-08-22 | End: 2024-08-22

## 2024-08-22 RX ORDER — PROCHLORPERAZINE EDISYLATE 5 MG/ML
5 INJECTION INTRAMUSCULAR; INTRAVENOUS
Status: DISCONTINUED | OUTPATIENT
Start: 2024-08-22 | End: 2024-08-22

## 2024-08-22 RX ORDER — DEXAMETHASONE 4 MG/1
4 TABLET ORAL DAILY
Status: DISCONTINUED | OUTPATIENT
Start: 2024-08-29 | End: 2024-08-23 | Stop reason: HOSPADM

## 2024-08-22 RX ORDER — SUCCINYLCHOLINE/SOD CL,ISO/PF 200MG/10ML
SYRINGE (ML) INTRAVENOUS PRN
Status: DISCONTINUED | OUTPATIENT
Start: 2024-08-22 | End: 2024-08-22 | Stop reason: SDUPTHER

## 2024-08-22 RX ORDER — OXYCODONE HYDROCHLORIDE 5 MG/1
5 TABLET ORAL EVERY 4 HOURS PRN
Status: DISCONTINUED | OUTPATIENT
Start: 2024-08-22 | End: 2024-08-23 | Stop reason: HOSPADM

## 2024-08-22 RX ORDER — GLUCAGON 1 MG/ML
1 KIT INJECTION PRN
Status: DISCONTINUED | OUTPATIENT
Start: 2024-08-22 | End: 2024-08-23 | Stop reason: HOSPADM

## 2024-08-22 RX ORDER — DEXTROSE MONOHYDRATE 100 MG/ML
INJECTION, SOLUTION INTRAVENOUS CONTINUOUS PRN
Status: DISCONTINUED | OUTPATIENT
Start: 2024-08-22 | End: 2024-08-23 | Stop reason: HOSPADM

## 2024-08-22 RX ORDER — HYDROMORPHONE HYDROCHLORIDE 2 MG/ML
INJECTION, SOLUTION INTRAMUSCULAR; INTRAVENOUS; SUBCUTANEOUS PRN
Status: DISCONTINUED | OUTPATIENT
Start: 2024-08-22 | End: 2024-08-22 | Stop reason: SDUPTHER

## 2024-08-22 RX ORDER — ACETAMINOPHEN 325 MG/1
650 TABLET ORAL EVERY 4 HOURS PRN
Status: DISCONTINUED | OUTPATIENT
Start: 2024-08-22 | End: 2024-08-23 | Stop reason: HOSPADM

## 2024-08-22 RX ORDER — ESMOLOL HYDROCHLORIDE 10 MG/ML
INJECTION INTRAVENOUS PRN
Status: DISCONTINUED | OUTPATIENT
Start: 2024-08-22 | End: 2024-08-22 | Stop reason: SDUPTHER

## 2024-08-22 RX ORDER — MANNITOL 20 G/100ML
INJECTION, SOLUTION INTRAVENOUS PRN
Status: DISCONTINUED | OUTPATIENT
Start: 2024-08-22 | End: 2024-08-22 | Stop reason: SDUPTHER

## 2024-08-22 RX ORDER — SODIUM CHLORIDE, SODIUM LACTATE, POTASSIUM CHLORIDE, AND CALCIUM CHLORIDE .6; .31; .03; .02 G/100ML; G/100ML; G/100ML; G/100ML
IRRIGANT IRRIGATION PRN
Status: DISCONTINUED | OUTPATIENT
Start: 2024-08-22 | End: 2024-08-22 | Stop reason: HOSPADM

## 2024-08-22 RX ORDER — MAGNESIUM HYDROXIDE/ALUMINUM HYDROXICE/SIMETHICONE 120; 1200; 1200 MG/30ML; MG/30ML; MG/30ML
15 SUSPENSION ORAL EVERY 6 HOURS PRN
Status: DISCONTINUED | OUTPATIENT
Start: 2024-08-22 | End: 2024-08-23 | Stop reason: HOSPADM

## 2024-08-22 RX ORDER — NIFEDIPINE 30 MG
30 TABLET, EXTENDED RELEASE ORAL DAILY
Status: DISCONTINUED | OUTPATIENT
Start: 2024-08-23 | End: 2024-08-23 | Stop reason: HOSPADM

## 2024-08-22 RX ORDER — MIDAZOLAM HYDROCHLORIDE 1 MG/ML
INJECTION INTRAMUSCULAR; INTRAVENOUS PRN
Status: DISCONTINUED | OUTPATIENT
Start: 2024-08-22 | End: 2024-08-22 | Stop reason: SDUPTHER

## 2024-08-22 RX ORDER — NALOXONE HYDROCHLORIDE 0.4 MG/ML
0.2 INJECTION, SOLUTION INTRAMUSCULAR; INTRAVENOUS; SUBCUTANEOUS PRN
Status: DISCONTINUED | OUTPATIENT
Start: 2024-08-22 | End: 2024-08-23 | Stop reason: HOSPADM

## 2024-08-22 RX ORDER — HYDROMORPHONE HYDROCHLORIDE 1 MG/ML
0.5 INJECTION, SOLUTION INTRAMUSCULAR; INTRAVENOUS; SUBCUTANEOUS EVERY 5 MIN PRN
Status: DISCONTINUED | OUTPATIENT
Start: 2024-08-22 | End: 2024-08-22

## 2024-08-22 RX ORDER — INSULIN LISPRO 100 [IU]/ML
0-4 INJECTION, SOLUTION INTRAVENOUS; SUBCUTANEOUS NIGHTLY
Status: DISCONTINUED | OUTPATIENT
Start: 2024-08-22 | End: 2024-08-23 | Stop reason: HOSPADM

## 2024-08-22 RX ORDER — SODIUM CHLORIDE 0.9 % (FLUSH) 0.9 %
5-40 SYRINGE (ML) INJECTION PRN
Status: DISCONTINUED | OUTPATIENT
Start: 2024-08-22 | End: 2024-08-23 | Stop reason: HOSPADM

## 2024-08-22 RX ORDER — GLYCOPYRROLATE 0.2 MG/ML
INJECTION INTRAMUSCULAR; INTRAVENOUS PRN
Status: DISCONTINUED | OUTPATIENT
Start: 2024-08-22 | End: 2024-08-22 | Stop reason: SDUPTHER

## 2024-08-22 RX ORDER — NALOXONE HYDROCHLORIDE 0.4 MG/ML
INJECTION, SOLUTION INTRAMUSCULAR; INTRAVENOUS; SUBCUTANEOUS PRN
Status: DISCONTINUED | OUTPATIENT
Start: 2024-08-22 | End: 2024-08-22

## 2024-08-22 RX ORDER — PANTOPRAZOLE SODIUM 40 MG/1
40 TABLET, DELAYED RELEASE ORAL
Status: DISCONTINUED | OUTPATIENT
Start: 2024-08-23 | End: 2024-08-22

## 2024-08-22 RX ORDER — SODIUM CHLORIDE 0.9 % (FLUSH) 0.9 %
5-40 SYRINGE (ML) INJECTION EVERY 12 HOURS SCHEDULED
Status: DISCONTINUED | OUTPATIENT
Start: 2024-08-22 | End: 2024-08-23 | Stop reason: HOSPADM

## 2024-08-22 RX ORDER — LABETALOL HYDROCHLORIDE 5 MG/ML
20 INJECTION, SOLUTION INTRAVENOUS EVERY 10 MIN PRN
Status: DISCONTINUED | OUTPATIENT
Start: 2024-08-22 | End: 2024-08-23 | Stop reason: HOSPADM

## 2024-08-22 RX ORDER — REMIFENTANIL HYDROCHLORIDE 1 MG/ML
INJECTION, POWDER, LYOPHILIZED, FOR SOLUTION INTRAVENOUS CONTINUOUS PRN
Status: DISCONTINUED | OUTPATIENT
Start: 2024-08-22 | End: 2024-08-22 | Stop reason: SDUPTHER

## 2024-08-22 RX ORDER — FENTANYL CITRATE 50 UG/ML
25 INJECTION, SOLUTION INTRAMUSCULAR; INTRAVENOUS EVERY 5 MIN PRN
Status: DISCONTINUED | OUTPATIENT
Start: 2024-08-22 | End: 2024-08-22

## 2024-08-22 RX ORDER — HYDROMORPHONE HYDROCHLORIDE 1 MG/ML
0.5 INJECTION, SOLUTION INTRAMUSCULAR; INTRAVENOUS; SUBCUTANEOUS
Status: DISCONTINUED | OUTPATIENT
Start: 2024-08-22 | End: 2024-08-23 | Stop reason: HOSPADM

## 2024-08-22 RX ADMIN — PROPOFOL 50 MG: 10 INJECTION, EMULSION INTRAVENOUS at 13:04

## 2024-08-22 RX ADMIN — METHOCARBAMOL 1000 MG: 100 INJECTION INTRAMUSCULAR; INTRAVENOUS at 20:00

## 2024-08-22 RX ADMIN — PHENYLEPHRINE HYDROCHLORIDE 20 MCG/MIN: 10 INJECTION INTRAVENOUS at 12:35

## 2024-08-22 RX ADMIN — WATER 2000 MG: 1 INJECTION INTRAMUSCULAR; INTRAVENOUS; SUBCUTANEOUS at 16:09

## 2024-08-22 RX ADMIN — SODIUM CHLORIDE, SODIUM LACTATE, POTASSIUM CHLORIDE, AND CALCIUM CHLORIDE: .6; .31; .03; .02 INJECTION, SOLUTION INTRAVENOUS at 12:05

## 2024-08-22 RX ADMIN — EPHEDRINE SULFATE 5 MG: 50 INJECTION INTRAVENOUS at 13:02

## 2024-08-22 RX ADMIN — ONDANSETRON 4 MG: 2 INJECTION INTRAMUSCULAR; INTRAVENOUS at 17:00

## 2024-08-22 RX ADMIN — MANNITOL 59.56 G: 20 INJECTION, SOLUTION INTRAVENOUS at 12:40

## 2024-08-22 RX ADMIN — SODIUM CHLORIDE, SODIUM LACTATE, POTASSIUM CHLORIDE, CALCIUM CHLORIDE: 600; 310; 30; 20 INJECTION, SOLUTION INTRAVENOUS at 12:05

## 2024-08-22 RX ADMIN — LIDOCAINE HYDROCHLORIDE 100 MG: 20 INJECTION, SOLUTION INTRAVENOUS at 12:11

## 2024-08-22 RX ADMIN — PROMETHAZINE HYDROCHLORIDE 12.5 MG: 25 TABLET ORAL at 21:49

## 2024-08-22 RX ADMIN — DEXAMETHASONE 4 MG: 4 TABLET ORAL at 19:57

## 2024-08-22 RX ADMIN — HYDROMORPHONE HYDROCHLORIDE 0.5 MG: 2 INJECTION, SOLUTION INTRAMUSCULAR; INTRAVENOUS; SUBCUTANEOUS at 16:20

## 2024-08-22 RX ADMIN — Medication 100 MG: at 12:12

## 2024-08-22 RX ADMIN — ONDANSETRON 4 MG: 2 INJECTION INTRAMUSCULAR; INTRAVENOUS at 12:11

## 2024-08-22 RX ADMIN — OXYCODONE HYDROCHLORIDE 10 MG: 5 TABLET ORAL at 19:57

## 2024-08-22 RX ADMIN — REMIFENTANIL HYDROCHLORIDE 0.2 MCG/KG/MIN: 1 INJECTION, POWDER, LYOPHILIZED, FOR SOLUTION INTRAVENOUS at 12:11

## 2024-08-22 RX ADMIN — AMINOLEVULINIC ACID HYDROCHLORIDE 1683 MG: 1500 POWDER, FOR SOLUTION ORAL at 10:18

## 2024-08-22 RX ADMIN — SODIUM CHLORIDE: 9 INJECTION, SOLUTION INTRAVENOUS at 18:04

## 2024-08-22 RX ADMIN — HYDROMORPHONE HYDROCHLORIDE 1 MG: 1 INJECTION, SOLUTION INTRAMUSCULAR; INTRAVENOUS; SUBCUTANEOUS at 21:43

## 2024-08-22 RX ADMIN — DEXAMETHASONE SODIUM PHOSPHATE 10 MG: 4 INJECTION INTRA-ARTICULAR; INTRALESIONAL; INTRAMUSCULAR; INTRAVENOUS; SOFT TISSUE at 12:11

## 2024-08-22 RX ADMIN — LACOSAMIDE 200 MG: 50 TABLET, FILM COATED ORAL at 21:38

## 2024-08-22 RX ADMIN — DEXAMETHASONE 4 MG: 4 TABLET ORAL at 23:44

## 2024-08-22 RX ADMIN — DIAZEPAM 5 MG: 5 TABLET ORAL at 23:44

## 2024-08-22 RX ADMIN — HYDROMORPHONE HYDROCHLORIDE 0.5 MG: 2 INJECTION, SOLUTION INTRAMUSCULAR; INTRAVENOUS; SUBCUTANEOUS at 13:05

## 2024-08-22 RX ADMIN — MIDAZOLAM HYDROCHLORIDE 2 MG: 2 INJECTION, SOLUTION INTRAMUSCULAR; INTRAVENOUS at 12:04

## 2024-08-22 RX ADMIN — HYDROMORPHONE HYDROCHLORIDE 0.5 MG: 2 INJECTION, SOLUTION INTRAMUSCULAR; INTRAVENOUS; SUBCUTANEOUS at 17:24

## 2024-08-22 RX ADMIN — LEVETIRACETAM 2000 MG: 500 TABLET, FILM COATED ORAL at 21:38

## 2024-08-22 RX ADMIN — WATER 2000 MG: 1 INJECTION INTRAMUSCULAR; INTRAVENOUS; SUBCUTANEOUS at 12:20

## 2024-08-22 RX ADMIN — LABETALOL HYDROCHLORIDE 20 MG: 5 INJECTION, SOLUTION INTRAVENOUS at 21:11

## 2024-08-22 RX ADMIN — PROPOFOL 125 MCG/KG/MIN: 10 INJECTION, EMULSION INTRAVENOUS at 12:10

## 2024-08-22 RX ADMIN — ACETAMINOPHEN 650 MG: 325 TABLET ORAL at 19:57

## 2024-08-22 RX ADMIN — SODIUM CHLORIDE, POTASSIUM CHLORIDE, SODIUM LACTATE AND CALCIUM CHLORIDE: 600; 310; 30; 20 INJECTION, SOLUTION INTRAVENOUS at 10:20

## 2024-08-22 RX ADMIN — CEFAZOLIN 2000 MG: 2 INJECTION, POWDER, FOR SOLUTION INTRAVENOUS at 23:45

## 2024-08-22 RX ADMIN — HYDROMORPHONE HYDROCHLORIDE 0.5 MG: 2 INJECTION, SOLUTION INTRAMUSCULAR; INTRAVENOUS; SUBCUTANEOUS at 12:08

## 2024-08-22 RX ADMIN — PHENYLEPHRINE HYDROCHLORIDE 30 MCG/MIN: 10 INJECTION INTRAVENOUS at 12:23

## 2024-08-22 RX ADMIN — PROPOFOL 200 MG: 10 INJECTION, EMULSION INTRAVENOUS at 12:11

## 2024-08-22 RX ADMIN — SODIUM CHLORIDE, POTASSIUM CHLORIDE, SODIUM LACTATE AND CALCIUM CHLORIDE: 600; 310; 30; 20 INJECTION, SOLUTION INTRAVENOUS at 10:34

## 2024-08-22 RX ADMIN — GLYCOPYRROLATE 0.2 MG: 0.2 INJECTION INTRAMUSCULAR; INTRAVENOUS at 12:57

## 2024-08-22 RX ADMIN — ESMOLOL HYDROCHLORIDE 30 MG: 10 INJECTION, SOLUTION INTRAVENOUS at 12:11

## 2024-08-22 RX ADMIN — HYDROMORPHONE HYDROCHLORIDE 0.5 MG: 2 INJECTION, SOLUTION INTRAMUSCULAR; INTRAVENOUS; SUBCUTANEOUS at 16:38

## 2024-08-22 RX ADMIN — LEVETIRACETAM 1000 MG: 500 INJECTION, SOLUTION, CONCENTRATE INTRAVENOUS at 12:50

## 2024-08-22 ASSESSMENT — PAIN DESCRIPTION - DESCRIPTORS
DESCRIPTORS: SHARP

## 2024-08-22 ASSESSMENT — PAIN SCALES - GENERAL
PAINLEVEL_OUTOF10: 0
PAINLEVEL_OUTOF10: 7
PAINLEVEL_OUTOF10: 9
PAINLEVEL_OUTOF10: 7
PAINLEVEL_OUTOF10: 0
PAINLEVEL_OUTOF10: 4

## 2024-08-22 ASSESSMENT — PAIN DESCRIPTION - FREQUENCY
FREQUENCY: CONTINUOUS

## 2024-08-22 ASSESSMENT — PAIN DESCRIPTION - ORIENTATION
ORIENTATION: RIGHT;ANTERIOR;OUTER

## 2024-08-22 ASSESSMENT — PAIN DESCRIPTION - LOCATION
LOCATION: HEAD

## 2024-08-22 ASSESSMENT — PAIN DESCRIPTION - PAIN TYPE
TYPE: ACUTE PAIN

## 2024-08-22 ASSESSMENT — PAIN DESCRIPTION - ONSET
ONSET: ON-GOING

## 2024-08-22 ASSESSMENT — PAIN - FUNCTIONAL ASSESSMENT
PAIN_FUNCTIONAL_ASSESSMENT: ACTIVITIES ARE NOT PREVENTED
PAIN_FUNCTIONAL_ASSESSMENT: 0-10

## 2024-08-22 ASSESSMENT — LIFESTYLE VARIABLES: SMOKING_STATUS: 1

## 2024-08-22 NOTE — CONSULTS
NEUROCRITICAL CARE CONSULT NOTE       Saul White MD is requesting this consult.  Reason for Consult: Post-op ICU management   Admission Chief Complaint: Presented for elective surgery     History of Present Illness     Erwin Doss is a 54 y.o. y/o male with HTN, Seizure history, smoking, medical marijuana use Glioblastoma who presents s/p elective right temporal craniotomy for resection of glioblastoma with gleolan assistance and gamma tile placment.           REVIEW OF SYSTEMS:   Constitutional- No weight loss or fevers   Eyes- No diplopia. No photophobia.   Ears/nose/throat- No dysphagia. No Dysarthria   Cardiovascular- No palpitations. No chest pain   Respiratory- No dyspnea. No Cough   Gastrointestinal- No Abdominal pain. No Vomiting.   Genitourinary- No incontinence. No urinary retention   Musculoskeletal- No myalgia. No arthralgia   Skin- No rash. No easy bruising.   Psychiatric- No depression. No anxiety   Endocrine- No diabetes. No thyroid issues.   Hematologic- No bleeding difficulty. No fatigue   Neurologic- +headache    Past Medical, Surgical, Family, and Social History   PAST MEDICAL HISTORY:  Past Medical History:   Diagnosis Date    Glioblastoma (HCC)     History of snuff use     Hx of radiation therapy     Hx of smoking     Hypertension     Marijuana use     Medical marijuana use     Seizure (HCC)     Use of cane as ambulatory aid     Vapes non-nicotine containing substance     Wears glasses      SURGICAL HISTORY:  Past Surgical History:   Procedure Laterality Date    CRANIOTOMY Right 2023    RIGHT TEMPORAL CRANIOTOMY FOR RESECTION OF BRAIN MASS performed by Saul White MD at [facility] OR     FAMILY HISTORY & SOCIAL HISTORY:  Family history non-contributory  History reviewed. No pertinent family history.  Social History     Tobacco Use    Smoking status: Former     Current packs/day: 0.00     Types: Cigarettes     Quit date: 2010     Years since quittin.1     Smokeless tobacco: Current     Types: Snuff   Vaping Use    Vaping status: Every Day    Substances: MARIJUANA   Substance Use Topics    Alcohol use: Not Currently     Alcohol/week: 6.0 standard drinks of alcohol     Types: 6 Cans of beer per week     Comment: social    Drug use: Yes     Types: Marijuana (Weed)     Comment: medical          Allergies & Outpatient Medications   ALLERGIES:  Allergies   Allergen Reactions    Iodine Swelling     IV CONTRAST DYE, Swelling all over (REQUIRES PREMEDS)     HOME MEDICATIONS:  Current Outpatient Medications   Medication Instructions    lacosamide (VIMPAT) 200 mg, Oral, 2 TIMES DAILY    levETIRAcetam (KEPPRA) 2,000 mg, Oral, 2 TIMES DAILY    methocarbamol (ROBAXIN) 500 mg, Oral, 4 TIMES DAILY    NIFEdipine (ADALAT CC) 30 mg, Oral, DAILY    NONFORMULARY SMOKES MEDICAL MARIJUANA    ondansetron (ZOFRAN-ODT) 4 mg, Oral, EVERY 12 HOURS PRN    pantoprazole (PROTONIX) 40 mg, Oral, DAILY BEFORE BREAKFAST    sennosides-docusate sodium (SENOKOT-S) 8.6-50 MG tablet 2 tablets, Oral, 2 TIMES DAILY PRN    vitamin B-1 (THIAMINE) 100 mg, Oral, DAILY       Physical Exam   PHYSICAL EXAM:  /81   Pulse 82   Temp 98.4 °F (36.9 °C) (Oral)   Resp 17   Ht 1.778 m (5' 10\")   Wt 79.4 kg (175 lb)   SpO2 91%   BMI 25.11 kg/m²     General Alert, no distress, well-nourished    Neurologic  Mental status:   Orientation to person, place, tells me its December, but able to tell me the year situation  Attention intact as able to attend well to the exam    Language fluent in conversation  Comprehension intact; follows simple commands    Cranial nerves:   CN2: Visual fields full w/o extinction on confrontational testing  CN 3,4,6: Pupils equal and reactive to light, extraocular muscles intact  CN5: Facial sensation symmetric   CN7: Face symmetric  CN8: Hearing symmetric to spoken voice  CN9: Palate elevated symmetrically  CN11: Traps full strength on shoulder shrug  CN12: Tongue midline with  discussed with:  Nursing staff  Dr. Bro Holder, APRN - CNP   Neurocritical Care   8/22/2024 7:30 PM  [facility]: [facility] Neurocritical Care    Critical Care:  Due to the immediate potential for life-threatening deterioration due to neurological failure, I spent 49 minutes providing critical care.  This time excludes time spent performing procedures but includes time spent on direct patient care, history retrieval, review of the chart, and discussions with patient, family, and consultant(s).

## 2024-08-22 NOTE — ANESTHESIA POSTPROCEDURE EVALUATION
Department of Anesthesiology  Postprocedure Note    Patient: Erwin Doss  MRN: [id number]  YOB: 1970  Date of evaluation: 8/22/2024    Procedure Summary       Date: 08/22/24 Room / Location: [address]    Anesthesia Start: 1205 Anesthesia Stop: 1724    Procedure: RIGHT TEMPORAL CRANIOTOMY FOR RESECTION OF GLIOBLASTOMA WITH GLEOLAN ASSISTANCE AND GAMMA TILE PLACEMENT (Right: Head) Diagnosis:       Glioblastoma multiforme of brain (HCC)      (Glioblastoma multiforme of brain (HCC) [C71.9])    Surgeons: Saul White MD Responsible Provider: Yue Kong DO    Anesthesia Type: general ASA Status: 3            Anesthesia Type: No value filed.    Saurav Phase I: Saurav Score: 8    Saurav Phase II:      Anesthesia Post Evaluation    Patient location during evaluation: PACU  Patient participation: complete - patient participated  Level of consciousness: awake and alert  Airway patency: patent  Nausea & Vomiting: no nausea and no vomiting  Cardiovascular status: blood pressure returned to baseline  Respiratory status: acceptable  Hydration status: euvolemic  Multimodal analgesia pain management approach  Pain management: adequate    No notable events documented.

## 2024-08-22 NOTE — BRIEF OP NOTE
Brief Postoperative Note      Patient: Erwin Doss  YOB: 1970  MRN: [id number]    Date of Procedure: 8/22/2024    Pre-Op Diagnosis Codes:      * Glioblastoma multiforme of brain (HCC) [C71.9]    Post-Op Diagnosis: Same       Procedure(s):  RIGHT TEMPORAL CRANIOTOMY FOR RESECTION OF GLIOBLASTOMA WITH GLEOLAN ASSISTANCE AND GAMMA TILE PLACEMENT    Surgeon(s):  Saul White MD Gozal, Yair M, MD    Assistant:  Surgical Assistant: Leah Baeza    Anesthesia: General    Estimated Blood Loss (mL): 200     Complications: None    Specimens:   ID Type Source Tests Collected by Time Destination   A : RIGHT TEMPORAL MASS Tissue Tissue SURGICAL PATHOLOGY Saul White MD 8/22/2024 1411    B : RIGHT TEMPORAL MASS Tissue Tissue SURGICAL PATHOLOGY Saul White MD 8/22/2024 1555        Implants:  Implant Name Type Inv. Item Serial No.  Lot No. LRB No. Used Action   GRAFT DURA [id number] ULTRAPURE DURAGN + - [id number]  GRAFT DURA [id number] ULTRAPURE DURAGN +  [employer] [id number] 1 Implanted         Drains:   Urinary Catheter 08/22/24 (Active)       Findings:  Infection Present At Time Of Surgery (PATOS) (choose all levels that have infection present):  No infection present  Other Findings: right frontotemporal craniotomy for re-resection of tumor,  Recurrent tumor confirmed on frozen pathology, 3.5 gamma-tiles placed, skin closed with staples    Electronically signed by Gabe Lopez MD on 8/22/2024 at 4:57 PM

## 2024-08-22 NOTE — H&P
History of Present Illness   HPI: This is a 54 year old man with GBM who presents for follow up.     He presented to [facility] in 11/2023 with new onset seizure and was found to have a right temporal GBM, underwent resection on 11/30, uncomplicated. Post op MRI on 12/1 revealed gross total resection.    He has since undergone temodar and radiation treatment shortly after surgery. He has continued to have seizures at about 1/month. During these, he gets a sensation of numbness on his left side of the body, and the left side goes numb and weak. He does not typically lose consciousness, does not have tonic-clonic episodes.    He is on keppra 2g BID, vimpat 200mg BID.    Other than those occasional seizures, he feels well, and says he lives independently without assistance.     We recently reviewed his case at the [facility] tumor board, and re-operation for re-resection and gamma tile placement was agreed as the best plan for the patient.      Review of Histories and Systems   I reviewed the patient's past medical history, social history, family history, and review of systems. The patient recorded this information in our chart and I reviewed it personally.      Physical Exam   The patient is well-appearing, pleasant, and in no acute distress.  The patient is alert and oriented ×4, appropriate, conversant, with normal mood and affect.  Pupils are equal, round, and reactive to light.  Extraocular muscles are intact.  Visual fields are grossly full.  Facial expression and sensation are symmetric.  Hearing is symmetric.  Tongue and uvula are midline.  Palate rises symmetrically.  Shoulder shrug and head rotation are strong and symmetric.  Strength is 5 out of 5 in all 4 extremities without pronator drift.  The patient walks with a well-balanced and well coordinated gait.  Romberg sign is negative.  Sensation is intact to light touch in all 4 extremities.  Deep tendon reflexes are 2+ upper and lower extremities were bilaterally  downgoing toes and negative Jesus.    Right fronto-temporal craniotomy wound - well healed. No fluid collections. Forehead symmetric.      Review of Testing   Review of Testing: I again reviewed his multiple brain MRIs:  including post op MRIs of 12/1, 12/31 from 2023, and 3/28, 5/10, and 7/3 from 2024  The 12/1 MRI revealed gross total resection.   On 12/31 and more obviously on 3/28, there was increased enhancement around the cavity, concerning for possible tumor progression. On the 3/28 scan, some tumor was noted along the corpus callosum and more posteriorly in the temporal lobe. Next, on 5/10, the enhancement along the corpus callosum had apprently resolved. On 5/10 and 7/3, these two films are quite similar and essentially stable, with persistent enhancement along the right temporal resection cavity, with a few very small, subtle, enhancing lesions along the right occipital horn, and in the posterior temporal lobe - these are the only regions which may reveal progressive, new enhancement.    Assessment   Impression: 54y M s/p right temporal GBM resection with gross total resection (11/30 at [facility]) with persistent focal seizures (1/month) who has likely tumor recurrence that initially appeared to progress rapidly (on March 2024 MRI, but may have been a larger component of radiation necrosis), which has then partially reversed radiographically. There remains extensive enhancement along the tumor cavity, and small subtle regions of deeper temporal lobe enhancement.    His team involves Dr Shields (medical oncology).     Aside from the roughtly once per month seizure, functionally the patient is doing very well.    I discussed the patient at tumor board to conside re-resection. The consensus was for reoperation and gamma tile placement.    Plan today for right craniotomy for tumor resection and gamma tile placement. ICU post op        Saul White MD, PhD  Endovascular Neurosurgery  [place]  [facility]  [phone] (office direct line)  [phone] ([facility]

## 2024-08-22 NOTE — ANESTHESIA PRE PROCEDURE
Department of Anesthesiology  Preprocedure Note       Name:  Erwin Doss   Age:  54 y.o.  :  1970                                          MRN:  [id number]         Date:  2024      Surgeon: Surgeon(s):  Saul White MD Gozal, Yair M, MD    Procedure: Procedure(s):  RIGHT TEMPORAL CRANIOTOMY FOR RESECTION OF GLIOBLASTOMA WITH GLEOLAN ASSISTANCE AND GAMMA TILE PLACEMENT    Medications prior to admission:   Prior to Admission medications    Medication Sig Start Date End Date Taking? Authorizing Provider   methocarbamol (ROBAXIN) 500 MG tablet Take 1 tablet by mouth 4 times daily   Yes Provider, MD Mert   NONFGREGORIAULARY SMOKES MEDICAL MARIJUANA   Yes ProviderMert MD   lacosamide (VIMPAT) 200 MG tablet Take 1 tablet by mouth 2 times daily for 90 days. Max Daily Amount: 400 mg 24 Yes Jake Lechuga APRN - CNP   levETIRAcetam (KEPPRA) 1000 MG tablet Take 2 tablets by mouth 2 times daily 23  Yes Jake Lechuga APRN - CNP   ondansetron (ZOFRAN-ODT) 4 MG disintegrating tablet Take 1 tablet by mouth every 12 hours as needed for Nausea or Vomiting 23  Yes Johny Mccullough APRN - CNP   sennosides-docusate sodium (SENOKOT-S) 8.6-50 MG tablet Take 2 tablets by mouth 2 times daily as needed for Constipation 23  Yes Johny Mccullough APRN - CNP   NIFEdipine (ADALAT CC) 30 MG extended release tablet Take 1 tablet by mouth daily   Yes ProviderMert MD   pantoprazole (PROTONIX) 40 MG tablet Take 1 tablet by mouth every morning (before breakfast) for 14 days 23  Johny Mccullough APRN - CNP   vitamin B-1 (THIAMINE) 100 MG tablet Take 1 tablet by mouth daily  Patient not taking: Reported on 2024   Lyndsey Duffy MD       Current medications:    Current Facility-Administered Medications   Medication Dose Route Frequency Provider Last Rate Last Admin   • ceFAZolin (ANCEF) 2,000 mg in sterile water 20 mL IV syringe  2,000

## 2024-08-22 NOTE — PROGRESS NOTES
Patient admitted to PACU # 13 from OR at 1722 post RIGHT TEMPORAL CRANIOTOMY FOR RESECTION OF GLIOBLASTOMA WITH GLEOLAN ASSISTANCE AND GAMMA TILE PLACEMENT - Right  per Saul White MD .  Attached to PACU monitoring system and report received from anesthesia provider.  Patient was reported to be hemodynamically stable during procedure.  Patient drowsy on admission and denied pain.    Pt arrived to PACU with gauze and medipore tape to surgical site on right side of head.      Pt was reported to have Gleolan in OR.

## 2024-08-22 NOTE — PROGRESS NOTES
Pt to CT Scan prior to Floor    PACU Transfer to Floor Note    Procedure(s):  RIGHT TEMPORAL CRANIOTOMY FOR RESECTION OF GLIOBLASTOMA WITH GLEOLAN ASSISTANCE AND GAMMA TILE PLACEMENT    Current Allergies: Iodine    Pt meets criteria as per Nida Score and ASPAN Standards to transfer to next phase of care.     No results for input(s): \"POCGLU\" in the last 72 hours.    Vitals:    08/22/24 1830   BP: 121/81   Pulse: 82   Resp: 17   Temp: 98.4 °F (36.9 °C)   SpO2: 91%     Vitals within 20% of pt's admission vitals as per NIDA SCORE    SpO2: 91 %    O2 Flow Rate (L/min): 2 L/min      Intake/Output Summary (Last 24 hours) at 8/22/2024 1836  Last data filed at 8/22/2024 1830  Gross per 24 hour   Intake 2422.75 ml   Output 1475 ml   Net 947.75 ml       Pain assessment:  none    Pain Level: 0    Patient was assessed for alterations to skin integrity. There were not alterations observed.    Handoff report given at bedside to RN for 4509  Family updated and directed to pt room      8/22/2024 6:36 PM

## 2024-08-22 NOTE — ANESTHESIA PROCEDURE NOTES
Arterial Line:    An arterial line was placed using surface landmarks, in the OR for the following indication(s): continuous blood pressure monitoring.    A 20 gauge (size) (length), Arrow (type) catheter was placed, into the right radial artery, secured by .  Anesthesia type: General    Events:  patient tolerated procedure well with no complications.8/22/2024 12:10 PM8/22/2024 12:14 PM  Anesthesiologist: Enoc Del Toro MD  Performed: Anesthesiologist   Preanesthetic Checklist  Completed: patient identified, IV checked, site marked, risks and benefits discussed, surgical/procedural consents, equipment checked, pre-op evaluation, timeout performed, anesthesia consent given, oxygen available, monitors applied/VS acknowledged, fire risk safety assessment completed and verbalized and blood product R/B/A discussed and consented

## 2024-08-22 NOTE — PROGRESS NOTES
Report received from PACU RN. Pt admitted to ICU 6331. Placed on continuous cardiac, spO2 and a-line monitoring. Pt 4-4-6, able to answered all orientation correctly except for month. Does follow x4. Denies pain. Bedside report giving to incoming night shift RN.

## 2024-08-23 ENCOUNTER — APPOINTMENT (OUTPATIENT)
Dept: MRI IMAGING | Age: 54
DRG: 023 | End: 2024-08-23
Attending: STUDENT IN AN ORGANIZED HEALTH CARE EDUCATION/TRAINING PROGRAM
Payer: COMMERCIAL

## 2024-08-23 VITALS
SYSTOLIC BLOOD PRESSURE: 148 MMHG | HEART RATE: 105 BPM | TEMPERATURE: 98.2 F | BODY MASS INDEX: 25.6 KG/M2 | RESPIRATION RATE: 21 BRPM | DIASTOLIC BLOOD PRESSURE: 83 MMHG | OXYGEN SATURATION: 98 % | HEIGHT: 70 IN | WEIGHT: 178.79 LBS

## 2024-08-23 LAB
ANION GAP SERPL CALCULATED.3IONS-SCNC: 11 MMOL/L (ref 3–16)
ANION GAP SERPL CALCULATED.3IONS-SCNC: 13 MMOL/L (ref 3–16)
BUN SERPL-MCNC: 6 MG/DL (ref 7–20)
BUN SERPL-MCNC: 7 MG/DL (ref 7–20)
CALCIUM SERPL-MCNC: 7.4 MG/DL (ref 8.3–10.6)
CALCIUM SERPL-MCNC: 9 MG/DL (ref 8.3–10.6)
CHLORIDE SERPL-SCNC: 109 MMOL/L (ref 99–110)
CHLORIDE SERPL-SCNC: 116 MMOL/L (ref 99–110)
CO2 SERPL-SCNC: 20 MMOL/L (ref 21–32)
CO2 SERPL-SCNC: 22 MMOL/L (ref 21–32)
CREAT SERPL-MCNC: 0.9 MG/DL (ref 0.9–1.3)
CREAT SERPL-MCNC: 1.1 MG/DL (ref 0.9–1.3)
DEPRECATED RDW RBC AUTO: 14 % (ref 12.4–15.4)
GFR SERPLBLD CREATININE-BSD FMLA CKD-EPI: 80 ML/MIN/{1.73_M2}
GFR SERPLBLD CREATININE-BSD FMLA CKD-EPI: >90 ML/MIN/{1.73_M2}
GLUCOSE SERPL-MCNC: 116 MG/DL (ref 70–99)
GLUCOSE SERPL-MCNC: 137 MG/DL (ref 70–99)
HCT VFR BLD AUTO: 32.2 % (ref 40.5–52.5)
HGB BLD-MCNC: 11.5 G/DL (ref 13.5–17.5)
MCH RBC QN AUTO: 34.3 PG (ref 26–34)
MCHC RBC AUTO-ENTMCNC: 35.8 G/DL (ref 31–36)
MCV RBC AUTO: 96 FL (ref 80–100)
PLATELET # BLD AUTO: 131 K/UL (ref 135–450)
PMV BLD AUTO: 7.1 FL (ref 5–10.5)
POTASSIUM SERPL-SCNC: 2.9 MMOL/L (ref 3.5–5.1)
POTASSIUM SERPL-SCNC: 3.7 MMOL/L (ref 3.5–5.1)
RBC # BLD AUTO: 3.36 M/UL (ref 4.2–5.9)
SODIUM SERPL-SCNC: 144 MMOL/L (ref 136–145)
SODIUM SERPL-SCNC: 147 MMOL/L (ref 136–145)
WBC # BLD AUTO: 7.6 K/UL (ref 4–11)

## 2024-08-23 PROCEDURE — 6360000002 HC RX W HCPCS: Performed by: NEUROLOGICAL SURGERY

## 2024-08-23 PROCEDURE — 97166 OT EVAL MOD COMPLEX 45 MIN: CPT

## 2024-08-23 PROCEDURE — 6370000000 HC RX 637 (ALT 250 FOR IP): Performed by: NEUROLOGICAL SURGERY

## 2024-08-23 PROCEDURE — A9576 INJ PROHANCE MULTIPACK: HCPCS | Performed by: FAMILY MEDICINE

## 2024-08-23 PROCEDURE — 6370000000 HC RX 637 (ALT 250 FOR IP)

## 2024-08-23 PROCEDURE — 97116 GAIT TRAINING THERAPY: CPT

## 2024-08-23 PROCEDURE — 97163 PT EVAL HIGH COMPLEX 45 MIN: CPT

## 2024-08-23 PROCEDURE — 2580000003 HC RX 258: Performed by: NEUROLOGICAL SURGERY

## 2024-08-23 PROCEDURE — 99024 POSTOP FOLLOW-UP VISIT: CPT | Performed by: NURSE PRACTITIONER

## 2024-08-23 PROCEDURE — 36415 COLL VENOUS BLD VENIPUNCTURE: CPT

## 2024-08-23 PROCEDURE — 97530 THERAPEUTIC ACTIVITIES: CPT

## 2024-08-23 PROCEDURE — 70553 MRI BRAIN STEM W/O & W/DYE: CPT

## 2024-08-23 PROCEDURE — 97535 SELF CARE MNGMENT TRAINING: CPT

## 2024-08-23 PROCEDURE — 6360000004 HC RX CONTRAST MEDICATION: Performed by: FAMILY MEDICINE

## 2024-08-23 PROCEDURE — 80048 BASIC METABOLIC PNL TOTAL CA: CPT

## 2024-08-23 PROCEDURE — APPNB45 APP NON BILLABLE 31-45 MINUTES

## 2024-08-23 PROCEDURE — 85027 COMPLETE CBC AUTOMATED: CPT

## 2024-08-23 RX ORDER — DIAZEPAM 5 MG
5 TABLET ORAL EVERY 12 HOURS PRN
Qty: 14 TABLET | Refills: 0 | Status: SHIPPED | OUTPATIENT
Start: 2024-08-23 | End: 2024-08-30

## 2024-08-23 RX ORDER — DEXAMETHASONE 4 MG/1
TABLET ORAL
Qty: 16 TABLET | Refills: 0 | Status: SHIPPED | OUTPATIENT
Start: 2024-08-23 | End: 2024-08-30

## 2024-08-23 RX ORDER — POTASSIUM CHLORIDE 1500 MG/1
40 TABLET, EXTENDED RELEASE ORAL PRN
Status: DISCONTINUED | OUTPATIENT
Start: 2024-08-23 | End: 2024-08-23 | Stop reason: HOSPADM

## 2024-08-23 RX ORDER — POTASSIUM CHLORIDE 7.45 MG/ML
10 INJECTION INTRAVENOUS PRN
Status: DISCONTINUED | OUTPATIENT
Start: 2024-08-23 | End: 2024-08-23 | Stop reason: HOSPADM

## 2024-08-23 RX ORDER — PANTOPRAZOLE SODIUM 40 MG/1
40 TABLET, DELAYED RELEASE ORAL
Status: DISCONTINUED | OUTPATIENT
Start: 2024-08-23 | End: 2024-08-23 | Stop reason: HOSPADM

## 2024-08-23 RX ORDER — TRAMADOL HYDROCHLORIDE 50 MG/1
50 TABLET ORAL EVERY 6 HOURS PRN
Qty: 28 TABLET | Refills: 0 | Status: SHIPPED | OUTPATIENT
Start: 2024-08-23 | End: 2024-08-30

## 2024-08-23 RX ADMIN — GADOTERIDOL 18 ML: 279.3 INJECTION, SOLUTION INTRAVENOUS at 12:40

## 2024-08-23 RX ADMIN — NIFEDIPINE 30 MG: 30 TABLET, EXTENDED RELEASE ORAL at 08:23

## 2024-08-23 RX ADMIN — POLYETHYLENE GLYCOL 3350 17 G: 17 POWDER, FOR SOLUTION ORAL at 08:24

## 2024-08-23 RX ADMIN — SENNOSIDES AND DOCUSATE SODIUM 1 TABLET: 50; 8.6 TABLET ORAL at 08:23

## 2024-08-23 RX ADMIN — METHOCARBAMOL 1000 MG: 100 INJECTION INTRAMUSCULAR; INTRAVENOUS at 11:48

## 2024-08-23 RX ADMIN — PANTOPRAZOLE SODIUM 40 MG: 40 TABLET, DELAYED RELEASE ORAL at 06:20

## 2024-08-23 RX ADMIN — SODIUM CHLORIDE: 9 INJECTION, SOLUTION INTRAVENOUS at 08:15

## 2024-08-23 RX ADMIN — SODIUM CHLORIDE, PRESERVATIVE FREE 10 ML: 5 INJECTION INTRAVENOUS at 08:24

## 2024-08-23 RX ADMIN — DEXAMETHASONE 4 MG: 4 TABLET ORAL at 06:20

## 2024-08-23 RX ADMIN — OXYCODONE HYDROCHLORIDE 10 MG: 5 TABLET ORAL at 01:52

## 2024-08-23 RX ADMIN — LEVETIRACETAM 2000 MG: 500 TABLET, FILM COATED ORAL at 08:23

## 2024-08-23 RX ADMIN — ACETAMINOPHEN 650 MG: 325 TABLET ORAL at 01:53

## 2024-08-23 RX ADMIN — METHOCARBAMOL 1000 MG: 100 INJECTION INTRAMUSCULAR; INTRAVENOUS at 04:19

## 2024-08-23 RX ADMIN — ONDANSETRON 4 MG: 2 INJECTION INTRAMUSCULAR; INTRAVENOUS at 00:26

## 2024-08-23 RX ADMIN — LACOSAMIDE 200 MG: 50 TABLET, FILM COATED ORAL at 08:23

## 2024-08-23 RX ADMIN — CEFAZOLIN 2000 MG: 2 INJECTION, POWDER, FOR SOLUTION INTRAVENOUS at 08:45

## 2024-08-23 RX ADMIN — OXYCODONE HYDROCHLORIDE 10 MG: 5 TABLET ORAL at 08:27

## 2024-08-23 RX ADMIN — DEXAMETHASONE 4 MG: 4 TABLET ORAL at 11:48

## 2024-08-23 ASSESSMENT — PAIN DESCRIPTION - ONSET
ONSET: ON-GOING

## 2024-08-23 ASSESSMENT — PAIN DESCRIPTION - FREQUENCY
FREQUENCY: CONTINUOUS

## 2024-08-23 ASSESSMENT — PAIN DESCRIPTION - DESCRIPTORS
DESCRIPTORS: SHARP

## 2024-08-23 ASSESSMENT — PAIN DESCRIPTION - ORIENTATION
ORIENTATION: RIGHT;ANTERIOR;OUTER
ORIENTATION: RIGHT;ANTERIOR;OUTER
ORIENTATION: RIGHT;OUTER;ANTERIOR
ORIENTATION: RIGHT;OUTER;ANTERIOR
ORIENTATION: RIGHT;ANTERIOR;OUTER

## 2024-08-23 ASSESSMENT — PAIN DESCRIPTION - PAIN TYPE
TYPE: ACUTE PAIN;SURGICAL PAIN
TYPE: ACUTE PAIN

## 2024-08-23 ASSESSMENT — PAIN DESCRIPTION - LOCATION
LOCATION: HEAD

## 2024-08-23 ASSESSMENT — PAIN SCALES - GENERAL
PAINLEVEL_OUTOF10: 6
PAINLEVEL_OUTOF10: 4
PAINLEVEL_OUTOF10: 6
PAINLEVEL_OUTOF10: 9
PAINLEVEL_OUTOF10: 0
PAINLEVEL_OUTOF10: 7
PAINLEVEL_OUTOF10: 7
PAINLEVEL_OUTOF10: 0
PAINLEVEL_OUTOF10: 0
PAINLEVEL_OUTOF10: 7

## 2024-08-23 ASSESSMENT — PAIN - FUNCTIONAL ASSESSMENT
PAIN_FUNCTIONAL_ASSESSMENT: ACTIVITIES ARE NOT PREVENTED

## 2024-08-23 NOTE — PLAN OF CARE
Problem: Discharge Planning  Goal: Discharge to home or other facility with appropriate resources  Outcome: Progressing     Problem: Pain  Goal: Verbalizes/displays adequate comfort level or baseline comfort level  Outcome: Progressing     Problem: Safety - Adult  Goal: Free from fall injury  Outcome: Progressing     Problem: Neurosensory - Adult  Goal: Achieves stable or improved neurological status  Outcome: Progressing  Goal: Absence of seizures  Outcome: Progressing  Goal: Remains free of injury related to seizures activity  Outcome: Progressing  Goal: Achieves maximal functionality and self care  Outcome: Progressing     Problem: Respiratory - Adult  Goal: Achieves optimal ventilation and oxygenation  Outcome: Progressing     Problem: Cardiovascular - Adult  Goal: Maintains optimal cardiac output and hemodynamic stability  Outcome: Progressing  Goal: Absence of cardiac dysrhythmias or at baseline  Outcome: Progressing     Problem: Skin/Tissue Integrity - Adult  Goal: Skin integrity remains intact  Outcome: Progressing  Goal: Incisions, wounds, or drain sites healing without S/S of infection  Outcome: Progressing  Goal: Oral mucous membranes remain intact  Outcome: Progressing     Problem: Musculoskeletal - Adult  Goal: Return mobility to safest level of function  Outcome: Progressing     Problem: Musculoskeletal - Adult  Goal: Return ADL status to a safe level of function  Outcome: Progressing     Problem: Infection - Adult  Goal: Absence of infection at discharge  Outcome: Progressing  Goal: Absence of infection during hospitalization  Outcome: Progressing  Goal: Absence of fever/infection during anticipated neutropenic period  Outcome: Progressing     Problem: Metabolic/Fluid and Electrolytes - Adult  Goal: Electrolytes maintained within normal limits  Outcome: Progressing  Goal: Hemodynamic stability and optimal renal function maintained  Outcome: Progressing  Goal: Glucose maintained within prescribed

## 2024-08-23 NOTE — PROGRESS NOTES
Occupational Therapy  Facility/Department: [facility] ICU  Occupational Therapy Initial Assessment/Treatment    Name: Erwin Doss  : 1970  MRN: [id number]  Date of Service: 2024    Discharge Recommendations:  24 hour supervision or assist, Home with Home health OT  OT Equipment Recommendations  Equipment Needed: Yes  Mobility Devices: ADL Assistive Devices  ADL Assistive Devices: Shower Chair without back       Patient Diagnosis(es): The encounter diagnosis was Glioblastoma multiforme of brain (HCC).  Past Medical History:  has a past medical history of Glioblastoma (HCC), History of snuff use, Hx of radiation therapy, Hx of smoking, Hypertension, Marijuana use, Medical marijuana use, Seizure (HCC), Use of cane as ambulatory aid, Vapes non-nicotine containing substance, and Wears glasses.  Past Surgical History:  has a past surgical history that includes craniotomy (Right, 2023) and craniotomy (Right, 2024).    Treatment Diagnosis: Impaired ADL and functional mobility      Assessment  Performance deficits / Impairments: Decreased functional mobility ;Decreased ADL status;Decreased endurance;Decreased balance;Decreased safe awareness  Assessment: Pt is from home with family and independent.  Wife works from home and is available to pt as needed. Pt has h/o seizures.  Currently, pt req CG for functional mobility and ADL.  Pt is impulsive and req for safe mobility and not t bed forward during ADL and transfers.  Anticipate home with 24 hr assist.  Will follow for acute OT.  Treatment Diagnosis: Impaired ADL and functional mobility  Prognosis: Good  Decision Making: Medium Complexity  REQUIRES OT FOLLOW-UP: Yes  Activity Tolerance  Activity Tolerance: Patient Tolerated treatment well  Activity Tolerance Comments: Cues required throughout session     Plan  Occupational Therapy Plan  Times Per Week: 5-7  Current Treatment Recommendations: Balance training, Functional mobility training, Endurance  Continued education needed            Treatment included ADL and transfer training.       AM-PAC - ADL  AM-PAC Daily Activity - Inpatient   How much help is needed for putting on and taking off regular lower body clothing?: A Little  How much help is needed for bathing (which includes washing, rinsing, drying)?: A Little  How much help is needed for toileting (which includes using toilet, bedpan, or urinal)?: A Little  How much help is needed for putting on and taking off regular upper body clothing?: A Little  How much help is needed for taking care of personal grooming?: A Little  How much help for eating meals?: None  AM-[facility] Inpatient Daily Activity Raw Score: 19  AM-PAC Inpatient ADL T-Scale Score : 40.22  ADL Inpatient CMS 0-100% Score: 42.8  ADL Inpatient CMS G-Code Modifier : CK    Goals                         No goals met  Short Term Goals  Time Frame for Short Term Goals: Discharge  Short Term Goal 1: Transfer to/from toilet with supervision  Short Term Goal 2: Stance with supervision x5 min while engaging in ADL  Short Term Goal 3: Lower body dressing with supervision  Patient Goals   Patient goals : Go home      Therapy Time   Individual Concurrent Group Co-treatment   Time In 0940         Time Out 1023         Minutes 43         Timed Code Treatment Minutes: 33 Minutes   Total Treatment Time:43    Kami Polk OTR/L [id number]

## 2024-08-23 NOTE — CARE COORDINATION
Case Management Assessment  Initial Evaluation    Date/Time of Evaluation: 8/23/2024 9:10 AM  Assessment Completed by: Sidra Delaney RN    If patient is discharged prior to next notation, then this note serves as note for discharge by case management.    Patient Name: Erwin Doss                   YOB: 1970  Diagnosis: Glioblastoma multiforme of brain (HCC) [C71.9]  GBM (glioblastoma multiforme) (HCC) [C71.9]                   Date / Time: 8/22/2024  9:51 AM    Patient Admission Status: Inpatient   Readmission Risk (Low < 19, Mod (19-27), High > 27): Readmission Risk Score: 20.9    Current PCP: Javier Chavez MD  PCP verified by CM? Yes    Chart Reviewed: Yes      History Provided by: Medical Record, Patient  Patient Orientation: Alert and Oriented    Patient Cognition: Alert    Hospitalization in the last 30 days (Readmission):  No    If yes, Readmission Assessment in CM Navigator will be completed.    Advance Directives:      Code Status: Full Code   Patient's Primary Decision Maker is: Legal Next of Kin    Primary Decision Maker: JoppaAnnette - Other - [phone]    Discharge Planning:    Patient lives with: Spouse/Significant Other Type of Home: House  Primary Care Giver: Self  Patient Support Systems include: Spouse/Significant Other, Family Members   Current Financial resources: Other (Comment) ([employer])  Current community resources: None  Current services prior to admission: None            Current DME:              Type of Home Care services:  PT, OT    ADLS  Prior functional level: Independent in ADLs/IADLs  Current functional level: Other (see comment) (tbd)    PT AM-PAC:   /24  OT AM-PAC:   /24    Family can provide assistance at DC: Other (comment) (tbd pending extent of needs)  Would you like Case Management to discuss the discharge plan with any other family members/significant others, and if so, who? No  Plans to Return to Present Housing: Yes  Potential Assistance  needed at discharge: Home Care, Outpatient PT/OT            Potential DME:  deferred  Patient expects to discharge to: House  Plan for transportation at discharge:  tbd    Financial    Payor: [employer] / Plan: CIG[employer] / Product Type: *No Product type* /     Does insurance require precert for SNF: Yes    Potential assistance Purchasing Medications: No  Meds-to-Beds request:  no      [id number] [place], [place] - [address] - P [phone] - F [phone]  [address]  [facility] [address]  Phone: [phone] Fax: [phone]      Notes:    Factors facilitating achievement of predicted outcomes: Family support    Barriers to discharge: Medical complications      Sidra Delaney RN  Case Management Department  [phone]

## 2024-08-23 NOTE — PROGRESS NOTES
4 Eyes Skin Assessment     NAME:  Erwin Doss  YOB: 1970  MEDICAL RECORD NUMBER:  [id number]    The patient is being assessed for  Admission    I agree that at least one RN has performed a thorough Head to Toe Skin Assessment on the patient. ALL assessment sites listed below have been assessed.      Areas assessed by both nurses:    Head, Face, Ears, Shoulders, Back, Chest, Arms, Elbows, Hands, Sacrum. Buttock, Coccyx, Ischium, Legs. Feet and Heels, and Under Medical Devices         Does the Patient have a Wound? No       Kendell Prevention initiated by RN: Yes  Wound Care Orders initiated by RN: No    Pressure Injury (Stage 3,4, Unstageable, DTI, NWPT, and Complex wounds) if present, place Wound referral order by RN under : No    New Ostomies, if present place, Ostomy referral order under : No     Nurse 1 eSignature: Electronically signed by Félix Florian RN on 8/23/24 at 4:55 AM EDT    **SHARE this note so that the co-signing nurse can place an eSignature**    Nurse 2 eSignature: {Esignature:[id number]}

## 2024-08-23 NOTE — OP NOTE
Operative Note      Patient: Erwin Doss  YOB: 1970  MRN: [id number]    Date of Procedure: 8/22/2024    Pre-Op Diagnosis Codes:      * Glioblastoma multiforme of brain (HCC) [C71.9]    Post-Op Diagnosis: Same       Procedure(s):  RIGHT TEMPORAL CRANIOTOMY FOR RESECTION OF GLIOBLASTOMA WITH GLEOLAN ASSISTANCE AND GAMMA TILE PLACEMENT    Surgeon(s):  Saul White MD Gozal, Yair M, MD    Assistant:   Surgical Assistant: Leah Baeza    Anesthesia: General    Estimated Blood Loss (mL): 200     Complications: None    Specimens:   ID Type Source Tests Collected by Time Destination   A : RIGHT TEMPORAL MASS Tissue Tissue SURGICAL PATHOLOGY Saul White MD 8/22/2024 1411    B : RIGHT TEMPORAL MASS Tissue Tissue SURGICAL PATHOLOGY Saul White MD 8/22/2024 1555        Implants:  Implant Name Type Inv. Item Serial No.  Lot No. LRB No. Used Action   GRAFT DURA [id number] ULTRAPURE DURAGN + - [id number]  GRAFT DURA [id number] ULTRAPURE DURAGN +  [employer]WD [id number] Right 1 Implanted   GAMMATILE THERAPY DEVICE 8 PACK - [id number]  GAMMATILE THERAPY DEVICE 8 PACK  [employer] [id number] Right 1 Implanted   SCREW BNE L5MM [id number] SELF DRL AXS UNIV NEURO 3 - [id number]  SCREW BNE L5MM [id number] SELF DRL AXS UNIV NEURO 3  JERRY CRANIOMAXILLOFACIAL-[id number]  Right 8 Implanted         Drains:   [REMOVED] Urinary Catheter 08/22/24 (Removed)   Output (mL) 200 mL 08/22/24 2000   Discontinuation Reason Per provider order 08/22/24 2000       Findings:  Infection Present At Time Of Surgery (PATOS) (choose all levels that have infection present):  No infection present      Date of Procedure: 08/22/2024      Procedure:   1. Right frontotemporal craniotomy for resection of tumor and placement of GammaTile radiation therapy seeds  2. Use of neurophysiologic monitoring for transcranial motor evoked potentials (TcMEP), somatosensory evoked potentials (SSEP), and  head turned to the left and fixed in the Singh pins.  Neurophysiologic monitoring for TcMEP and SSEP was initiated and showed normal, symmetric responses into all four extremities. The Brain Lab stereotactic system was registered to the patient and accuracy confirmed with anatomic landmarks.  Hair was shaved to overly the tumor with an incision behind the hair line.  The site was prepped and draped in sterile fashion.      The skin was infiltrated with 1% lidocaine and incised with a #10 blade.  Hemostasis was maintained with Risa clips. The scalp was elevated with the temporalis muscle and mobilized anteriorly with fish hooks.  The prior cranial plating screws were removed, and the bone was removed.  Dura was re-opened over the prior suture line, in semilunar fashion and tacked up with 4-0 vicryl.  The approach to the tumor was confirmed with the Brain Lab probe, for a planned right temporal lobectomy.    I started dissecting the tumor under loupe magnification. We used bipolar electrocautery to enter the middle temporal gyrus and resected temporal lobe tumor and tissue in a subpial fashion. Specimen was sent for frozen analysis, and the pathologist quickly confirmed presence of high grade glioma. We identified the middle cerebral arteries, maintaining a pial plane between the vessels and our dissection cavity. We entered the lateral ventricle and identified some choroid plexus. The uncus and hippocampus were removed. We continued to resect inferiorly to the sid overlying the middle fossa floor, and continued medially to the sid along the cavernous sinus and ambient cistern. We then remove the superficial temporal gyrus. We felt that we had resected the temporal lobe back to the posterior extent of the tumor.     At this point we brought in the microscope. I inspected the surgical cavity under white light, and then under fluorescent light to visualize the 5-ALA fluorescence. A small amount of bright pink  signal, indicative of more tumor tissue, was identified and resected. Reassessment under fluorescent light did not reveal any more signal. The microscope was removed.     At this point, I felt the tumor resection was complete.  With the guidance of Dr Torres (radiation oncologist), we delivered 3.5 GammaTile seeds to the posterior and superior aspect of the cavity, avoiding placement near the brainstem. Reassessment with TcMEP and SSEP were done and were stable. We confirmed good hemostasis with irrigation, small amount of thrombin matrix and small pieces of fibrillar and surgicele.  We closed the dura with 4-0 neurolon, adherus dural sealant, onlay Duragen and replaced the bone with standard plates and screws.  The temporalis fascia and galea were closed with 2-0 vicryl and the skin with staples.   The patient was awakened from anesthesia and  extubated, then taken to PACU in stable condition.    Complications: none immediate      Electronically signed by Saul White MD on 8/23/2024 at 11:44 AM

## 2024-08-23 NOTE — DISCHARGE INSTRUCTIONS
[facility] Brain Tumor Survey  The [facility] [place] and [facility] value your feedback related to your recent hospital admission and surgery.  The anonymous survey below consists of a few questions that are related to your stay and around your surgery, treatment, and recovery while in the hospital. It is anonymous and has only a few questions.  The estimated length of time needed to complete this survey is 3 minutes or less. Thank you for completing this survey!

## 2024-08-23 NOTE — PROGRESS NOTES
Physical Therapy  Facility/Department: [facility] ICU  Physical Therapy Initial Assessment / Treatment    Name: Erwin Doss  : 1970  MRN: [id number]  Date of Service: 2024    Discharge Recommendations:  24 hour supervision or assist, Home with Home health PT   PT Equipment Recommendations  Equipment Needed: No      Patient Diagnosis(es): The encounter diagnosis was Glioblastoma multiforme of brain (HCC).  Past Medical History:  has a past medical history of Glioblastoma (HCC), History of snuff use, Hx of radiation therapy, Hx of smoking, Hypertension, Marijuana use, Medical marijuana use, Seizure (HCC), Use of cane as ambulatory aid, Vapes non-nicotine containing substance, and Wears glasses.  Past Surgical History:  has a past surgical history that includes craniotomy (Right, 2023) and craniotomy (Right, 2024).    Assessment  Body Structures, Functions, Activity Limitations Requiring Skilled Therapeutic Intervention: Decreased functional mobility   Assessment: Pt is 54 y.o. male POD #1 s/p right temporal craniotomy for resection of glioblastoma with gleolan assistance and gamma tile placment.  Pt is from home with family and indep with mobility at baseline.  Today, pt requires CGA to min A for safe mobility.  Mildly impulsive at times.  Decreased cognition noted with high level activities.  Anticipate good improvement with practice and when able to have lights on in room (currently in gleolan precautions).  Rec home with 24hr assist and home PT.  Will follow.  Treatment Diagnosis: impaired gait and transfers s/p craniotomy  Therapy Prognosis: Good;Guarded  Decision Making: High Complexity  Requires PT Follow-Up: Yes    Plan  Physical Therapy Plan  General Plan: 5-7 times per week  Current Treatment Recommendations: Balance training, Gait training, Functional mobility training, Stair training, Transfer training, Therapeutic activities, Patient/Caregiver education & training  Safety Devices  Type  railing?: A Little  AM-PAC Inpatient Mobility Raw Score : 18  AM-PAC Inpatient T-Scale Score : 43.63  Mobility Inpatient CMS 0-100% Score: 46.58  Mobility Inpatient CMS G-Code Modifier : CK         Tinneti Score       Goals  Short Term Goals  Time Frame for Short Term Goals: discharge  Short Term Goal 1: Pt will transfer supine <--> sit with supervision  Short Term Goal 2: Pt will transfer sit <--> stand with supervision  Short Term Goal 3: Pt will amb 150 ft with supervision  Short Term Goal 4: Pt will negotiate 1 step with CGA  Patient Goals   Patient Goals : return home with family       Education  Patient Education  Education Given To: Patient  Education Provided: Role of Therapy  Education Method: Demonstration  Barriers to Learning: None  Education Outcome: Verbalized understanding;Continued education needed      Therapy Time   Individual Concurrent Group Co-treatment   Time In 0940         Time Out 1023         Minutes 43                 Timed Code Treatment Minutes:  28    Total Treatment Minutes:  43    If patient is discharged prior to next treatment, this note will serve as the discharge summary.  Mary Lou Mcqueen, PT, DPT  [id number]

## 2024-08-23 NOTE — CONSULTS
V2.0  History and Physical      Name:  Erwin Doss /Age/Sex: 1970  (54 y.o. male)   MRN & CSN:  [id number] & [id number] Encounter Date/Time: 2024 9:00 AM EDT   Location:  4509/4509-01 PCP: Javier Chavze MD       Hospital Day: 2    Assessment and Plan:       Assessment/plan:    Hypertension:  Patient currently on nifedipine extended release 30 mg daily and is being administered this morning  BP is a little on the high side continue monitor at this time the    Status post resection of glioblastoma:  Patient currently on steroids  Continue corrective dose of insulin  Sugars in the low 100s  Management per primary team    Patient's medications have been reviewed    Thank you for the opportunity to participate in this patient's care we will continue to follow with you    Disposition:       Diet ADULT DIET; Regular   DVT Prophylaxis [] Lovenox, [x]  Heparin, [] SCDs, [] Ambulation,  [] Eliquis, [] Xarelto, [] Coumadin   Code Status Full Code   Surrogate Decision Maker/ POA      Personally reviewed Lab Studies and Imaging             History from:     Patient and chart    History of Present Illness:     Chief Complaint: Glioblastoma       HPI: is a 54 y.o. male with pmh of hypertension who presents with glioblastoma status postsurgical resection.  Patient is somewhat frustrated this morning.  He feels okay but the lines and being in the bed is can allow him to be a little frustrated.  Wife is at bedside.  Nursing reports patient doing well at this time without any issues overnight.     Review of Systems:        Pertinent positives and negatives discussed in HPI     Objective:     Intake/Output Summary (Last 24 hours) at 2024 0900  Last data filed at 2024 0600  Gross per 24 hour   Intake 3593.75 ml   Output 2875 ml   Net 718.75 ml      Vitals:   Vitals:    24 0500 24 0600 24 0700 24 0800   BP: (!) 144/88 (!) 150/88 (!) 155/77    Pulse: 90 (!) 104 94    Resp: 27 25 (!) 9  temporal lobe extending to the right temporal horn where there is persistence of minimal  enhancement. There are patchy areas of satellite nodular enhancement adjacent to dominant masslike enhancement. There are also clustered areas of enhancement about the right periatrial region which are stable. There is some subtle patchy areas of enhancement about the right frontal horn (series 2, images 97-98), not clearly identified on the prior study. Confluent FLAIR signal abnormality throughout the right cerebral hemisphere with extension across the genu and splenium of the corpus callosum appears overall similar given differences in technique. Stable ventricular and sulcal size. Major intracranial vascular structures unremarkable. Superimposed mild patchy periventricular and subcortical white matter hyperintense T2/FLAIR signal abnormality. Right mastoid effusion.     Postoperative changes of right frontotemporal craniotomy. Irregular masslike enhancement in the right anterior temporal lobe appears similar. Adjacent satellite areas of nodular enhancement as well as clustered nodular enhancement about the right periatrial region appears stable, however there is subtle suspected new foci of enhancement about the right frontal horn. Electronically signed by Jayden Nguyen MD        Electronically signed by Miles Barroso MD on 8/23/2024 at 9:00 AM

## 2024-08-23 NOTE — DISCHARGE SUMMARY
Discharge Summary    Date of Admission: 8/22/2024  9:51 AM  Date of Discharge: 8/23/2024  Admission Diagnosis: GBM (glioblastoma multiforme) (HCC)  Discharge Diagnosis: Same   Condition on Discharge: good  Attending for Admission: Saul White MD  Procedures: Procedure(s) (LRB):  RIGHT TEMPORAL CRANIOTOMY FOR RESECTION OF GLIOBLASTOMA WITH GLEOLAN ASSISTANCE AND GAMMA TILE PLACEMENT (Right)  Consults: IP CONSULT TO NEUROCRITICAL CARE  IP CONSULT TO HOSPITALIST    Reason for Admission:  Erwin Doss is a 54 y.o. male patient who was admitted to the hospital for complaints of glioblastoma. He underwent the procedure listed above on 8/22/2024.     Hospital Course:  After surgery, His pre-operative glioblastoma pain was recected. He complained of incisional pain. The pain was well-controlled on oral medications.  The incision was clean, dry and intact. There was no erythema or edema around the surgical site. Prior to discharge He was eating well, urinating and ambulating with a steady gait.    Discharge Vitals/Labs:  BP (!) 148/83   Pulse (!) 105   Temp 98.2 °F (36.8 °C) (Oral)   Resp 21   Ht 1.778 m (5' 10\")   Wt 81.1 kg (178 lb 12.7 oz)   SpO2 98%   BMI 25.65 kg/m²   CBC:   Lab Results   Component Value Date/Time    WBC 7.6 08/23/2024 04:44 AM    RBC 3.36 08/23/2024 04:44 AM    HGB 11.5 08/23/2024 04:44 AM    HCT 32.2 08/23/2024 04:44 AM    MCV 96.0 08/23/2024 04:44 AM    MCH 34.3 08/23/2024 04:44 AM    MCHC 35.8 08/23/2024 04:44 AM    RDW 14.0 08/23/2024 04:44 AM     08/23/2024 04:44 AM    MPV 7.1 08/23/2024 04:44 AM     BMP:    Lab Results   Component Value Date/Time     08/23/2024 05:56 AM    K 3.7 08/23/2024 05:56 AM    K 3.5 08/13/2024 11:53 AM     08/23/2024 05:56 AM    CO2 22 08/23/2024 05:56 AM    BUN 7 08/23/2024 05:56 AM    CREATININE 1.1 08/23/2024 05:56 AM    CALCIUM 9.0 08/23/2024 05:56 AM    GFRAA >60 02/10/2020 12:38 PM    LABGLOM 80 08/23/2024 05:56 AM    LABGLOM  Vomiting     pantoprazole 40 MG tablet  Commonly known as: PROTONIX  Take 1 tablet by mouth every morning (before breakfast) for 14 days     sennosides-docusate sodium 8.6-50 MG tablet  Commonly known as: SENOKOT-S  Take 2 tablets by mouth 2 times daily as needed for Constipation            STOP taking these medications      vitamin B-1 100 MG tablet  Commonly known as: THIAMINE               Where to Get Your Medications        These medications were sent to [id number] - [place], [place] - [address] - P [phone] - F [phone]  [address] [facility] [facility] [address]      Hours: 24-hours Phone: [phone]   dexAMETHasone 4 MG tablet  diazePAM 5 MG tablet  traMADol 50 MG tablet         Discharge Destination:  The patient was discharged to Home.     Follow-up:  The patient is to follow-up with Saul White MD in the office in 2 weeks      Discharge Instructions:   Verbal and written discharge instructions were given to the patient at the time of discharge.    Electronically signed by: MIN Raymundo CNP, APRN-CNP, 8/23/2024 3:37 PM  [phone]    I spent 45 minutes in the care of this patient.  Over 50% of that time was in face-to-face counseling regarding disease process, diagnostic testing, preventative measures, and answering patient and family questions

## 2024-08-23 NOTE — PROGRESS NOTES
Patient's A-line is reading in the 190s-210s- also pt is moving  a lot in bed. BP Cuff is reading in the 150s. NCC made aware and she agreed to go off cuff and ignore the A-line readings when it does not correlate with the cuff readings.

## 2024-08-23 NOTE — PLAN OF CARE
Problem: Discharge Planning  Goal: Discharge to home or other facility with appropriate resources  8/23/2024 1422 by Jessica Abdul RN  Outcome: Progressing  Flowsheets  Taken 8/23/2024 1200  Discharge to home or other facility with appropriate resources: Arrange for needed discharge resources and transportation as appropriate  Taken 8/23/2024 0800  Discharge to home or other facility with appropriate resources: Identify barriers to discharge with patient and caregiver  8/23/2024 0552 by Félix Florian RN  Outcome: Progressing     Problem: Pain  Goal: Verbalizes/displays adequate comfort level or baseline comfort level  8/23/2024 1422 by Jessica Abdul RN  Outcome: Progressing  Flowsheets (Taken 8/23/2024 1200)  Verbalizes/displays adequate comfort level or baseline comfort level: Administer analgesics based on type and severity of pain and evaluate response  8/23/2024 0552 by Félix Florian RN  Outcome: Progressing     Problem: Safety - Adult  Goal: Free from fall injury  8/23/2024 1422 by Jessica Abdul RN  Outcome: Progressing  Flowsheets (Taken 8/23/2024 1357)  Free From Fall Injury: Instruct family/caregiver on patient safety  8/23/2024 0552 by Félix Florian RN  Outcome: Progressing     Problem: Neurosensory - Adult  Goal: Achieves stable or improved neurological status  8/23/2024 1422 by Jessica Abdul RN  Outcome: Progressing  Flowsheets  Taken 8/23/2024 1200  Achieves stable or improved neurological status: Initiate measures to prevent increased intracranial pressure  Taken 8/23/2024 0800  Achieves stable or improved neurological status: Initiate measures to prevent increased intracranial pressure  8/23/2024 0552 by Félix Florian RN  Outcome: Progressing  Goal: Absence of seizures  8/23/2024 1422 by Jessica Abdul RN  Outcome: Progressing  Flowsheets  Taken 8/23/2024 1200  Absence of seizures: Administer anticonvulsants as ordered  Taken 8/23/2024 0800  Absence of seizures: Monitor for seizure  RN  Outcome: Progressing  Goal: Absence of infection during hospitalization  8/23/2024 1422 by Jessica Abdul RN  Outcome: Progressing  Flowsheets  Taken 8/23/2024 1357  Absence of infection during hospitalization: Monitor all insertion sites i.e., indwelling lines, tubes and drains  Taken 8/23/2024 1200  Absence of infection during hospitalization: Monitor all insertion sites i.e., indwelling lines, tubes and drains  Taken 8/23/2024 0800  Absence of infection during hospitalization: Monitor lab/diagnostic results  8/23/2024 0552 by Félix Florian RN  Outcome: Progressing  Goal: Absence of fever/infection during anticipated neutropenic period  8/23/2024 1422 by Jessica Abdul RN  Outcome: Progressing  Flowsheets  Taken 8/23/2024 1357  Absence of fever/infection during anticipated neutropenic period: Monitor white blood cell count  Taken 8/23/2024 1200  Absence of fever/infection during anticipated neutropenic period: Monitor white blood cell count  Taken 8/23/2024 0800  Absence of fever/infection during anticipated neutropenic period: Monitor white blood cell count  8/23/2024 0552 by Félix Florian RN  Outcome: Progressing     Problem: Metabolic/Fluid and Electrolytes - Adult  Goal: Electrolytes maintained within normal limits  8/23/2024 1422 by Jessica Abdul RN  Outcome: Progressing  Flowsheets  Taken 8/23/2024 1200  Electrolytes maintained within normal limits: Monitor labs and assess patient for signs and symptoms of electrolyte imbalances  Taken 8/23/2024 0800  Electrolytes maintained within normal limits: Monitor labs and assess patient for signs and symptoms of electrolyte imbalances  8/23/2024 0552 by Félix Florian RN  Outcome: Progressing  Goal: Hemodynamic stability and optimal renal function maintained  8/23/2024 1422 by Jessica Abdul RN  Outcome: Progressing  Flowsheets  Taken 8/23/2024 1200  Hemodynamic stability and optimal renal function maintained: Monitor intake, output and patient  weight  Taken 8/23/2024 0800  Hemodynamic stability and optimal renal function maintained: Monitor intake, output and patient weight  8/23/2024 0552 by Félix Florian RN  Outcome: Progressing  Goal: Glucose maintained within prescribed range  8/23/2024 1422 by Jessica Abdul RN  Outcome: Progressing  Flowsheets  Taken 8/23/2024 1200  Glucose maintained within prescribed range: Assess for signs and symptoms of hyperglycemia and hypoglycemia  Taken 8/23/2024 0800  Glucose maintained within prescribed range: Assess for signs and symptoms of hyperglycemia and hypoglycemia  8/23/2024 0552 by Félix Florian, RN  Outcome: Progressing     Problem: ABCDS Injury Assessment  Goal: Absence of physical injury  Outcome: Progressing  Flowsheets (Taken 8/23/2024 1357)  Absence of Physical Injury: Implement safety measures based on patient assessment

## 2024-08-23 NOTE — PLAN OF CARE
Problem: Discharge Planning  Goal: Discharge to home or other facility with appropriate resources  8/23/2024 1522 by Jessica Abdul RN  Outcome: Completed  8/23/2024 1422 by Jessica Abdul RN  Outcome: Progressing  Flowsheets  Taken 8/23/2024 1200  Discharge to home or other facility with appropriate resources: Arrange for needed discharge resources and transportation as appropriate  Taken 8/23/2024 0800  Discharge to home or other facility with appropriate resources: Identify barriers to discharge with patient and caregiver  8/23/2024 0552 by Félix Florian RN  Outcome: Progressing     Problem: Pain  Goal: Verbalizes/displays adequate comfort level or baseline comfort level  8/23/2024 1522 by Jessica Abdul RN  Outcome: Completed  8/23/2024 1422 by Jessica Abdul RN  Outcome: Progressing  Flowsheets (Taken 8/23/2024 1200)  Verbalizes/displays adequate comfort level or baseline comfort level: Administer analgesics based on type and severity of pain and evaluate response  8/23/2024 0552 by Félix Florian RN  Outcome: Progressing     Problem: Safety - Adult  Goal: Free from fall injury  8/23/2024 1522 by Jessica Abdul RN  Outcome: Completed  8/23/2024 1422 by Jessica Abdul RN  Outcome: Progressing  Flowsheets (Taken 8/23/2024 1357)  Free From Fall Injury: Instruct family/caregiver on patient safety  8/23/2024 0552 by Félix Florian RN  Outcome: Progressing     Problem: Neurosensory - Adult  Goal: Achieves stable or improved neurological status  8/23/2024 1522 by Jessica Abdul RN  Outcome: Completed  8/23/2024 1422 by Jessica Abdul RN  Outcome: Progressing  Flowsheets  Taken 8/23/2024 1200  Achieves stable or improved neurological status: Initiate measures to prevent increased intracranial pressure  Taken 8/23/2024 0800  Achieves stable or improved neurological status: Initiate measures to prevent increased intracranial pressure  8/23/2024 0552 by Félix Florian RN  Outcome: Progressing  Goal: Absence of  fever/infection during anticipated neutropenic period: Monitor white blood cell count  Taken 8/23/2024 0800  Absence of fever/infection during anticipated neutropenic period: Monitor white blood cell count  8/23/2024 0552 by Félix Florian RN  Outcome: Progressing     Problem: Metabolic/Fluid and Electrolytes - Adult  Goal: Electrolytes maintained within normal limits  8/23/2024 1522 by Jessica Abdul RN  Outcome: Completed  8/23/2024 1422 by Jessica Abdul RN  Outcome: Progressing  Flowsheets  Taken 8/23/2024 1200  Electrolytes maintained within normal limits: Monitor labs and assess patient for signs and symptoms of electrolyte imbalances  Taken 8/23/2024 0800  Electrolytes maintained within normal limits: Monitor labs and assess patient for signs and symptoms of electrolyte imbalances  8/23/2024 0552 by Félix Florian RN  Outcome: Progressing  Goal: Hemodynamic stability and optimal renal function maintained  8/23/2024 1522 by Jessica Abdul RN  Outcome: Completed  8/23/2024 1422 by Jessica Abdul RN  Outcome: Progressing  Flowsheets  Taken 8/23/2024 1200  Hemodynamic stability and optimal renal function maintained: Monitor intake, output and patient weight  Taken 8/23/2024 0800  Hemodynamic stability and optimal renal function maintained: Monitor intake, output and patient weight  8/23/2024 0552 by Félix Florian RN  Outcome: Progressing  Goal: Glucose maintained within prescribed range  8/23/2024 1522 by Jessica Abdul RN  Outcome: Completed  8/23/2024 1422 by Jessica Abdul RN  Outcome: Progressing  Flowsheets  Taken 8/23/2024 1200  Glucose maintained within prescribed range: Assess for signs and symptoms of hyperglycemia and hypoglycemia  Taken 8/23/2024 0800  Glucose maintained within prescribed range: Assess for signs and symptoms of hyperglycemia and hypoglycemia  8/23/2024 0552 by Félix Florian RN  Outcome: Progressing     Problem: ABCDS Injury Assessment  Goal: Absence of physical injury  8/23/2024  1522 by Carico, April, RN  Outcome: Completed  8/23/2024 1422 by Jessica Abdul RN  Outcome: Progressing  Flowsheets (Taken 8/23/2024 1357)  Absence of Physical Injury: Implement safety measures based on patient assessment

## 2024-08-23 NOTE — PROGRESS NOTES
Brain Tumor Education    Patient educated using \"Your Guide to Neurosurgery\" Brain Tumor Booklet.     Topic: Discharge  Page:  10      Nurse eSignature: Electronically signed by Jessica Abdul RN on 8/23/24 at 2:22 PM EDT

## 2024-08-23 NOTE — PROGRESS NOTES
Pt DC to home per MD order. PIVs removed, site covered with gauze and tape. AVS printed and reviewed with pt including home meds and follow up appt, all questions answered. Pt escorted to main entrance to family vehicle with all belongings.

## 2024-08-23 NOTE — PROGRESS NOTES
NEUROSURGERY PROGRESS NOTE    8/23/2024 11:05 AM                               Erwin Doss                      LOS: 1 day   POD#  s/p Procedure(s) (LRB):  RIGHT TEMPORAL CRANIOTOMY FOR RESECTION OF GLIOBLASTOMA WITH GLEOLAN ASSISTANCE AND GAMMA TILE PLACEMENT (Right)    Subjective:  No acute events overnight. Patient has no specific complaints this am.         Physical Exam:  Patient seen and examined    Vitals:    08/23/24 0800   BP:    Pulse:    Resp: 14   Temp: 98.8 °F (37.1 °C)   SpO2: 96%     GCS:  4 - Opens eyes on own  5 - Alert and oriented  6 - Follows simple motor commands  General: Well developed. Alert and cooperative in no acute distress.     HENT: atraumatic, neck supple  Eyes: Optic discs: Not tested  Pulmonary: unlabored respiratory effort  Cardiovascular:  Warm well perfused. No peripheral edema  Gastrointestinal: abdomen soft, NT, ND    Neurological:  Mental Status: Awake, alert, oriented x 4, speech clear and appropriate. Impulsive  Attention: Intact  Language: No aphasia or dysarthria noted  Sensation: Intact to all extremities to light touch  Coordination: Intact    Cranial Nerves:  II: Visual acuity not tested, denies new visual changes / diplopia  III, IV, VI: PERRL, 3 mm bilaterally, EOMI, no nystagmus noted  V: Facial sensation intact bilaterally to touch  VII: Face symmetric  VIII: Hearing intact bilaterally to spoken voice  IX: Palate movement equal bilaterally  XI: Shoulder shrug equal bilaterally  XII: Tongue midline    Musculoskeletal:   Gait: Not tested   Assist devices: None   Tone: normal  Motor strength:   5/5 strength throughout    Surgical site clean, dry, intact    Radiological Findings:  CT Head WO Contrast:  IMPRESSION:  1. Postoperative changes as above including trace postoperative hemorrhagic  products and small extra-axial postoperative fluid collection.  2. Pneumocephalus.  3. Low-attenuation right cerebral white matter.      Labs:  Recent Labs     08/23/24  8282    WBC 7.6   HGB 11.5*   HCT 32.2*   *       Recent Labs     08/23/24  0556      K 3.7      CO2 22   BUN 7   CREATININE 1.1   GLUCOSE 137*   CALCIUM 9.0       Recent Labs     08/22/24  1025   PROTIME 13.6   INR 1.02   APTT 23.8       Patient Active Problem List    Diagnosis Date Noted    GBM (glioblastoma multiforme) ([facility]) 08/22/2024    Breakthrough seizure ([facility]) 04/02/2024    Hematoma of scalp 12/11/2023    Focal seizures ([facility]) 12/10/2023    S/P craniotomy 12/01/2023    Brain mass 11/28/2023    Nonintractable generalized idiopathic epilepsy without status epilepticus ([facility]) 11/28/2023    Seizures ([facility]) 10/29/2023    Seizure ([facility]) 09/08/2023    Encephalitis 09/08/2023    Abnormal brain MRI 09/08/2023    Primary hypertension 09/08/2023    Smoking 09/08/2023    Cannabis dependence ([facility]) 09/08/2023    Alcohol use 09/08/2023    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 male s/p Procedure(s) (LRB):  RIGHT TEMPORAL CRANIOTOMY FOR RESECTION OF GLIOBLASTOMA WITH GLEOLAN ASSISTANCE AND GAMMA TILE PLACEMENT (Right)    Plan:  Neurologic exams frequency:  - Q4H  For change in exam MUST contact neurosurgery team along with critical care or primary team  GBM (glioblastoma multiforme) ([facility]):  - Procedure(s) (LRB):  RIGHT TEMPORAL CRANIOTOMY FOR RESECTION OF GLIOBLASTOMA WITH GLEOLAN ASSISTANCE AND GAMMA TILE PLACEMENT (Right)  - CT Head shows expected post op changes  - MRI Brain w wo  - Cefazolin x 3 doses post op, or while drain in place  - Vimpat and Keppra at home doses  - Tylenol & PRN Roxicodone and Dilaudid for pain  - Glycolax and Senokot-S for bowel regimen  - Gleolan precautions  Cerebral edema:  - Keep Na within normal limits  - Decadron taper  - Keep HOB >30 degrees  DVT Prophylaxis: SCD's & SQ Heparin in AM  Mobility:  - Advance as tolerates  - PT/OT consulted, appreciate recs  Diet: Advance as tolerates  Appreciate critical care team  assistance in management  Outpatient follow up in two weeks with Dr. White  Follow up with established Neurologist for AED management    DISPO: Pending MRI, may be able to discharge today    Patient was discussed with Dr. White who agrees with above assessment and plan.     Electronically signed by: MIN Diaz CNP, APRN-CNP, 8/23/2024 11:05 AM  [phone]

## 2024-08-23 NOTE — PLAN OF CARE
[facility] [place]  Consult received.  Case reviewed     Full note to follow.    PCP is Jvaier Chavez MD    Thanks  Miles Barroso MD

## 2024-08-23 NOTE — CARE COORDINATION
Case Management Assessment            Discharge Note                    Date / Time of Note: 8/23/2024 3:26 PM                  Discharge Note Completed by: Sidra Delaney RN    Patient Name: Erwin Doss   YOB: 1970  Diagnosis: Glioblastoma multiforme of brain (HCC) [C71.9]  GBM (glioblastoma multiforme) (HCC) [C71.9]   Date / Time: 8/22/2024  9:51 AM    Current PCP: Javier Chavez MD     Advance Directives:  Code Status: Full Code    Financial:  Payor: CIGNA / Plan: CIGNA / Product Type: *No Product type* /      Pharmacy:    [id number] [address] - P [phone] - F [phone]  [address]  [facility] [address]  Phone: [phone] Fax: [phone]    ADLS:  Current PT AM-PAC Score: 18 /24  Current OT AM-PAC Score: 19 /24    DISCHARGE Disposition: Home- No Services Needed    Transportation:  Transportation PLAN for discharge: family   Mode of Transport: Private Car    Home Care:  Therapy recommended HHC. Pt declined    Durable Medical Equipment:  Pt declined need    Additional CM Notes: Met with Mr. Doss and his family at the bedside to discuss needs for discharge. Therapy recommended HHC. He declined need at this time. Advised if he changes his mind, to contact NSGY or his PCP for orders and a referral. He expressed understanding. Family was at the bedside to provide transportation home.    COVID Result:  No results found for: \"COVID19\"    The Plan for Transition of Care is related to the following treatment goals of Glioblastoma multiforme of brain (HCC) [C71.9]  GBM (glioblastoma multiforme) (HCC) [C71.9]    The Patient and/or patient representative Erwin and his family were provided with a choice of provider and agrees with the discharge plan Yes    Freedom of choice list was provided with basic dialogue that supports the patient's individualized plan of care/goals and shares the quality data associated with the providers.  Yes      Sidra Delaney RN  [facility]  Case Management Department  [phone]

## 2024-09-03 LAB
Lab: NORMAL
REPORT: NORMAL
THIS TEST SENT TO: NORMAL

## 2024-09-05 NOTE — PROGRESS NOTES
Physician Progress Note      PATIENT:               PEPE CHRISTIAN  [facility] #:                  [id number]  :                       1970  ADMIT DATE:       2024 9:51 AM  DISCH DATE:        2024 3:41 PM  RESPONDING  PROVIDER #:        TORIE Harris CNP          QUERY TEXT:    Patient admitted with glioblastoma s/p R Temporal Resection and Gamma Tile   Placement. Documentation reflects \"cerebral edema\" in Neurosurgery Progress   note(s) dated 24.  If possible, please document in the progress notes   and discharge summary if the cerebral edema was:    The medical record reflects the following:    Risk Factors: 54 y.o. male patient who was admitted for Right temporal   Craniotomy and Gamma Tile Placement for glioblastoma    Clinical Indicators:   CT of the Head \"Effacement of right sulci from   regional mass effect....Pneumocephalus\"   MRI of the Brain \"Small epidural postoperative gas fluid collection 5 mm   thick located deep to the  craniotomy and adjacent to the right middle cranial fossa surgical cavity. \"   Neurosurgery Progress Note \"Cerebral edema- Keep Na within normal limits-   Decadron taper- Keep HOB >30 degrees\"    Treatment: IV Decadron 10mg x 2 tapered to Decadron 4mg po qid tapered, HOB   elevated greater than 30 degrees, CT of the Head and MRI of the Brain    Thank you,    Eva Murry BSN RN CRCR CCS  CD[facility] Specialist  [email or web address]  Options provided:  -- Cerebral Edema confirmed after study  -- Cerebral Edema was ruled out after study  -- Other - I will add my own diagnosis  -- Disagree - Not applicable / Not valid  -- Disagree - Clinically unable to determine / Unknown  -- Refer to Clinical Documentation Reviewer    PROVIDER RESPONSE TEXT:    Cerebral Edema confirmed after study.    Query created by: Eva Murry on 2024 2:33 PM      Electronically signed by:  TORIE Harris CNP 2024 11:02 AM

## 2024-09-16 ENCOUNTER — APPOINTMENT (OUTPATIENT)
Dept: CT IMAGING | Age: 54
End: 2024-09-16
Payer: COMMERCIAL

## 2024-09-16 ENCOUNTER — HOSPITAL ENCOUNTER (EMERGENCY)
Age: 54
Discharge: LEFT BEFORE DISCHARGE | End: 2024-09-16
Attending: STUDENT IN AN ORGANIZED HEALTH CARE EDUCATION/TRAINING PROGRAM
Payer: COMMERCIAL

## 2024-09-16 ENCOUNTER — APPOINTMENT (OUTPATIENT)
Dept: GENERAL RADIOLOGY | Age: 54
End: 2024-09-16
Payer: COMMERCIAL

## 2024-09-16 VITALS
DIASTOLIC BLOOD PRESSURE: 81 MMHG | RESPIRATION RATE: 16 BRPM | TEMPERATURE: 99.1 F | WEIGHT: 164.1 LBS | OXYGEN SATURATION: 94 % | SYSTOLIC BLOOD PRESSURE: 157 MMHG | HEIGHT: 70 IN | BODY MASS INDEX: 23.49 KG/M2 | HEART RATE: 82 BPM

## 2024-09-16 DIAGNOSIS — J18.1 LUNG CONSOLIDATION (HCC): ICD-10-CM

## 2024-09-16 DIAGNOSIS — R56.9 SEIZURE (HCC): Primary | ICD-10-CM

## 2024-09-16 LAB
ANION GAP SERPL CALCULATED.3IONS-SCNC: 9 MMOL/L (ref 3–16)
BASOPHILS # BLD: 0 K/UL (ref 0–0.2)
BASOPHILS NFR BLD: 0.4 %
BUN SERPL-MCNC: 7 MG/DL (ref 7–20)
CALCIUM SERPL-MCNC: 9.2 MG/DL (ref 8.3–10.6)
CHLORIDE SERPL-SCNC: 102 MMOL/L (ref 99–110)
CO2 SERPL-SCNC: 26 MMOL/L (ref 21–32)
CREAT SERPL-MCNC: 1.2 MG/DL (ref 0.9–1.3)
DEPRECATED RDW RBC AUTO: 13.2 % (ref 12.4–15.4)
EOSINOPHIL # BLD: 0 K/UL (ref 0–0.6)
EOSINOPHIL NFR BLD: 0.3 %
FLUAV RNA RESP QL NAA+PROBE: NOT DETECTED
FLUBV RNA RESP QL NAA+PROBE: NOT DETECTED
GFR SERPLBLD CREATININE-BSD FMLA CKD-EPI: 72 ML/MIN/{1.73_M2}
GLUCOSE SERPL-MCNC: 132 MG/DL (ref 70–99)
HCT VFR BLD AUTO: 41.1 % (ref 40.5–52.5)
HGB BLD-MCNC: 14.5 G/DL (ref 13.5–17.5)
LYMPHOCYTES # BLD: 0.4 K/UL (ref 1–5.1)
LYMPHOCYTES NFR BLD: 5.2 %
MAGNESIUM SERPL-MCNC: 1.7 MG/DL (ref 1.8–2.4)
MCH RBC QN AUTO: 32.8 PG (ref 26–34)
MCHC RBC AUTO-ENTMCNC: 35.3 G/DL (ref 31–36)
MCV RBC AUTO: 92.9 FL (ref 80–100)
MONOCYTES # BLD: 1 K/UL (ref 0–1.3)
MONOCYTES NFR BLD: 13.3 %
NEUTROPHILS # BLD: 6.2 K/UL (ref 1.7–7.7)
NEUTROPHILS NFR BLD: 80.8 %
PLATELET # BLD AUTO: 175 K/UL (ref 135–450)
PMV BLD AUTO: 7.3 FL (ref 5–10.5)
POTASSIUM SERPL-SCNC: 3.4 MMOL/L (ref 3.5–5.1)
RBC # BLD AUTO: 4.43 M/UL (ref 4.2–5.9)
SARS-COV-2 RNA RESP QL NAA+PROBE: NOT DETECTED
SODIUM SERPL-SCNC: 137 MMOL/L (ref 136–145)
WBC # BLD AUTO: 7.6 K/UL (ref 4–11)

## 2024-09-16 PROCEDURE — 87636 SARSCOV2 & INF A&B AMP PRB: CPT

## 2024-09-16 PROCEDURE — 6360000002 HC RX W HCPCS: Performed by: PHYSICIAN ASSISTANT

## 2024-09-16 PROCEDURE — 80048 BASIC METABOLIC PNL TOTAL CA: CPT

## 2024-09-16 PROCEDURE — 96375 TX/PRO/DX INJ NEW DRUG ADDON: CPT

## 2024-09-16 PROCEDURE — 99284 EMERGENCY DEPT VISIT MOD MDM: CPT

## 2024-09-16 PROCEDURE — 96374 THER/PROPH/DIAG INJ IV PUSH: CPT

## 2024-09-16 PROCEDURE — 70450 CT HEAD/BRAIN W/O DYE: CPT

## 2024-09-16 PROCEDURE — 85025 COMPLETE CBC W/AUTO DIFF WBC: CPT

## 2024-09-16 PROCEDURE — 83735 ASSAY OF MAGNESIUM: CPT

## 2024-09-16 PROCEDURE — 2580000003 HC RX 258: Performed by: PHYSICIAN ASSISTANT

## 2024-09-16 PROCEDURE — 71045 X-RAY EXAM CHEST 1 VIEW: CPT

## 2024-09-16 RX ORDER — LEVOFLOXACIN 750 MG/1
750 TABLET, FILM COATED ORAL DAILY
Qty: 10 TABLET | Refills: 0 | Status: SHIPPED | OUTPATIENT
Start: 2024-09-16 | End: 2024-09-26

## 2024-09-16 RX ORDER — SODIUM CHLORIDE, SODIUM LACTATE, POTASSIUM CHLORIDE, AND CALCIUM CHLORIDE .6; .31; .03; .02 G/100ML; G/100ML; G/100ML; G/100ML
1000 INJECTION, SOLUTION INTRAVENOUS ONCE
Status: COMPLETED | OUTPATIENT
Start: 2024-09-16 | End: 2024-09-16

## 2024-09-16 RX ORDER — ONDANSETRON 2 MG/ML
4 INJECTION INTRAMUSCULAR; INTRAVENOUS ONCE
Status: COMPLETED | OUTPATIENT
Start: 2024-09-16 | End: 2024-09-16

## 2024-09-16 RX ORDER — HYDROMORPHONE HYDROCHLORIDE 1 MG/ML
0.5 INJECTION, SOLUTION INTRAMUSCULAR; INTRAVENOUS; SUBCUTANEOUS ONCE
Status: COMPLETED | OUTPATIENT
Start: 2024-09-16 | End: 2024-09-16

## 2024-09-16 RX ADMIN — ONDANSETRON 4 MG: 2 INJECTION INTRAMUSCULAR; INTRAVENOUS at 11:25

## 2024-09-16 RX ADMIN — SODIUM CHLORIDE, POTASSIUM CHLORIDE, SODIUM LACTATE AND CALCIUM CHLORIDE 1000 ML: 600; 310; 30; 20 INJECTION, SOLUTION INTRAVENOUS at 10:09

## 2024-09-16 RX ADMIN — HYDROMORPHONE HYDROCHLORIDE 0.5 MG: 1 INJECTION, SOLUTION INTRAMUSCULAR; INTRAVENOUS; SUBCUTANEOUS at 10:09

## 2024-09-16 ASSESSMENT — ENCOUNTER SYMPTOMS
NAUSEA: 0
SORE THROAT: 0
PHOTOPHOBIA: 0
DIARRHEA: 0
CHEST TIGHTNESS: 0
ABDOMINAL PAIN: 0
BACK PAIN: 0
WHEEZING: 0
SHORTNESS OF BREATH: 0
COUGH: 1
BLOOD IN STOOL: 0
VOMITING: 0
TROUBLE SWALLOWING: 0

## 2024-09-16 ASSESSMENT — PAIN - FUNCTIONAL ASSESSMENT
PAIN_FUNCTIONAL_ASSESSMENT: ACTIVITIES ARE NOT PREVENTED
PAIN_FUNCTIONAL_ASSESSMENT: 0-10

## 2024-09-16 ASSESSMENT — PAIN SCALES - GENERAL: PAINLEVEL_OUTOF10: 8

## 2024-09-16 ASSESSMENT — PAIN DESCRIPTION - DESCRIPTORS
DESCRIPTORS: ACHING;SHARP
DESCRIPTORS: PRESSURE

## 2024-09-16 ASSESSMENT — LIFESTYLE VARIABLES
HOW MANY STANDARD DRINKS CONTAINING ALCOHOL DO YOU HAVE ON A TYPICAL DAY: PATIENT DOES NOT DRINK
HOW OFTEN DO YOU HAVE A DRINK CONTAINING ALCOHOL: NEVER

## 2024-09-16 ASSESSMENT — PAIN DESCRIPTION - ORIENTATION
ORIENTATION: RIGHT
ORIENTATION: MID

## 2024-09-16 ASSESSMENT — PAIN DESCRIPTION - LOCATION
LOCATION: HAND
LOCATION: HEAD

## 2024-09-29 ENCOUNTER — APPOINTMENT (OUTPATIENT)
Dept: CT IMAGING | Age: 54
End: 2024-09-29
Payer: COMMERCIAL

## 2024-09-29 ENCOUNTER — APPOINTMENT (OUTPATIENT)
Dept: GENERAL RADIOLOGY | Age: 54
End: 2024-09-29
Payer: COMMERCIAL

## 2024-09-29 ENCOUNTER — HOSPITAL ENCOUNTER (EMERGENCY)
Age: 54
Discharge: HOME | End: 2024-09-30
Attending: EMERGENCY MEDICINE
Payer: COMMERCIAL

## 2024-09-29 VITALS
BODY MASS INDEX: 23.55 KG/M2 | SYSTOLIC BLOOD PRESSURE: 114 MMHG | TEMPERATURE: 98.3 F | HEART RATE: 98 BPM | RESPIRATION RATE: 20 BRPM | DIASTOLIC BLOOD PRESSURE: 80 MMHG | HEIGHT: 70 IN | OXYGEN SATURATION: 93 %

## 2024-09-29 DIAGNOSIS — R56.9 SEIZURE (HCC): Primary | ICD-10-CM

## 2024-09-29 LAB
BASE EXCESS BLDV CALC-SCNC: -1.6 MMOL/L (ref -2–3)
BASOPHILS # BLD: 0 K/UL (ref 0–0.2)
BASOPHILS NFR BLD: 0.5 %
CO2 BLDV-SCNC: 24 MMOL/L
COHGB MFR BLDV: 1.4 % (ref 0–1.5)
DEPRECATED RDW RBC AUTO: 13.4 % (ref 12.4–15.4)
DO-HGB MFR BLDV: 33.6 %
EOSINOPHIL # BLD: 0.1 K/UL (ref 0–0.6)
EOSINOPHIL NFR BLD: 0.7 %
HCO3 BLDV-SCNC: 22.7 MMOL/L (ref 24–28)
HCT VFR BLD AUTO: 41.2 % (ref 40.5–52.5)
HGB BLD-MCNC: 14.5 G/DL (ref 13.5–17.5)
LYMPHOCYTES # BLD: 0.6 K/UL (ref 1–5.1)
LYMPHOCYTES NFR BLD: 6.9 %
MCH RBC QN AUTO: 32 PG (ref 26–34)
MCHC RBC AUTO-ENTMCNC: 35.3 G/DL (ref 31–36)
MCV RBC AUTO: 90.5 FL (ref 80–100)
METHGB MFR BLDV: 0.4 % (ref 0–1.5)
MONOCYTES # BLD: 0.3 K/UL (ref 0–1.3)
MONOCYTES NFR BLD: 3.7 %
NEUTROPHILS # BLD: 7.3 K/UL (ref 1.7–7.7)
NEUTROPHILS NFR BLD: 88.2 %
PCO2 BLDV: 36.3 MMHG (ref 41–51)
PH BLDV: 7.4 [PH] (ref 7.35–7.45)
PLATELET # BLD AUTO: 360 K/UL (ref 135–450)
PMV BLD AUTO: 6.4 FL (ref 5–10.5)
PO2 BLDV: 34.2 MMHG (ref 25–40)
RBC # BLD AUTO: 4.55 M/UL (ref 4.2–5.9)
SAO2 % BLDV: 66 %
WBC # BLD AUTO: 8.2 K/UL (ref 4–11)

## 2024-09-29 PROCEDURE — 99285 EMERGENCY DEPT VISIT HI MDM: CPT

## 2024-09-29 PROCEDURE — 6360000002 HC RX W HCPCS

## 2024-09-29 PROCEDURE — 96372 THER/PROPH/DIAG INJ SC/IM: CPT

## 2024-09-29 PROCEDURE — 71045 X-RAY EXAM CHEST 1 VIEW: CPT

## 2024-09-29 PROCEDURE — 83735 ASSAY OF MAGNESIUM: CPT

## 2024-09-29 PROCEDURE — 70450 CT HEAD/BRAIN W/O DYE: CPT

## 2024-09-29 PROCEDURE — 83605 ASSAY OF LACTIC ACID: CPT

## 2024-09-29 PROCEDURE — 80048 BASIC METABOLIC PNL TOTAL CA: CPT

## 2024-09-29 PROCEDURE — 82077 ASSAY SPEC XCP UR&BREATH IA: CPT

## 2024-09-29 PROCEDURE — 82803 BLOOD GASES ANY COMBINATION: CPT

## 2024-09-29 PROCEDURE — 85025 COMPLETE CBC W/AUTO DIFF WBC: CPT

## 2024-09-29 RX ORDER — DROPERIDOL 2.5 MG/ML
INJECTION, SOLUTION INTRAMUSCULAR; INTRAVENOUS
Status: COMPLETED
Start: 2024-09-29 | End: 2024-09-29

## 2024-09-29 RX ORDER — DROPERIDOL 2.5 MG/ML
2.5 INJECTION, SOLUTION INTRAMUSCULAR; INTRAVENOUS EVERY 6 HOURS PRN
Status: DISCONTINUED | OUTPATIENT
Start: 2024-09-29 | End: 2024-09-30 | Stop reason: HOSPADM

## 2024-09-29 RX ORDER — MIDAZOLAM HYDROCHLORIDE 5 MG/ML
INJECTION INTRAMUSCULAR; INTRAVENOUS
Status: COMPLETED
Start: 2024-09-29 | End: 2024-09-29

## 2024-09-29 RX ORDER — MIDAZOLAM HYDROCHLORIDE 5 MG/ML
5 INJECTION, SOLUTION INTRAMUSCULAR; INTRAVENOUS ONCE
Status: COMPLETED | OUTPATIENT
Start: 2024-09-29 | End: 2024-09-29

## 2024-09-29 RX ADMIN — MIDAZOLAM HYDROCHLORIDE 5 MG: 5 INJECTION, SOLUTION INTRAMUSCULAR; INTRAVENOUS at 22:56

## 2024-09-29 RX ADMIN — MIDAZOLAM 5 MG: 5 INJECTION INTRAMUSCULAR; INTRAVENOUS at 22:56

## 2024-09-29 RX ADMIN — DROPERIDOL 2.5 MG: 2.5 INJECTION, SOLUTION INTRAMUSCULAR; INTRAVENOUS at 23:25

## 2024-09-30 LAB
ANION GAP SERPL CALCULATED.3IONS-SCNC: 11 MMOL/L (ref 3–16)
ANION GAP SERPL CALCULATED.3IONS-SCNC: 18 MMOL/L (ref 3–16)
BACTERIA URNS QL MICRO: ABNORMAL /HPF
BILIRUB UR QL STRIP.AUTO: NEGATIVE
BUN SERPL-MCNC: 8 MG/DL (ref 7–20)
BUN SERPL-MCNC: 9 MG/DL (ref 7–20)
CALCIUM SERPL-MCNC: 9.1 MG/DL (ref 8.3–10.6)
CALCIUM SERPL-MCNC: 9.8 MG/DL (ref 8.3–10.6)
CHLORIDE SERPL-SCNC: 102 MMOL/L (ref 99–110)
CHLORIDE SERPL-SCNC: 98 MMOL/L (ref 99–110)
CLARITY UR: CLEAR
CO2 SERPL-SCNC: 21 MMOL/L (ref 21–32)
CO2 SERPL-SCNC: 25 MMOL/L (ref 21–32)
COLOR UR: YELLOW
CREAT SERPL-MCNC: 1 MG/DL (ref 0.9–1.3)
CREAT SERPL-MCNC: 1.2 MG/DL (ref 0.9–1.3)
EKG ATRIAL RATE: 85 BPM
EKG DIAGNOSIS: NORMAL
EKG P AXIS: 74 DEGREES
EKG P-R INTERVAL: 114 MS
EKG Q-T INTERVAL: 368 MS
EKG QRS DURATION: 82 MS
EKG QTC CALCULATION (BAZETT): 437 MS
EKG R AXIS: 74 DEGREES
EKG T AXIS: 81 DEGREES
EKG VENTRICULAR RATE: 85 BPM
ETHANOLAMINE SERPL-MCNC: NORMAL MG/DL (ref 0–0.08)
GFR SERPLBLD CREATININE-BSD FMLA CKD-EPI: 72 ML/MIN/{1.73_M2}
GFR SERPLBLD CREATININE-BSD FMLA CKD-EPI: 90 ML/MIN/{1.73_M2}
GLUCOSE SERPL-MCNC: 109 MG/DL (ref 70–99)
GLUCOSE SERPL-MCNC: 165 MG/DL (ref 70–99)
GLUCOSE UR STRIP.AUTO-MCNC: NEGATIVE MG/DL
HGB UR QL STRIP.AUTO: NEGATIVE
HYALINE CASTS #/AREA URNS LPF: ABNORMAL /LPF (ref 0–2)
KETONES UR STRIP.AUTO-MCNC: NEGATIVE MG/DL
LACTATE BLDV-SCNC: 1.4 MMOL/L (ref 0.4–2)
LACTATE BLDV-SCNC: 5.5 MMOL/L (ref 0.4–2)
LEUKOCYTE ESTERASE UR QL STRIP.AUTO: NEGATIVE
LEVETIRACETAM SERPL-MCNC: 10.3 UG/ML (ref 6–46)
MAGNESIUM SERPL-MCNC: 2.1 MG/DL (ref 1.8–2.4)
MAGNESIUM SERPL-MCNC: 2.1 MG/DL (ref 1.8–2.4)
MEDICATION DOSE-MCNC: NORMAL
MUCOUS THREADS #/AREA URNS LPF: ABNORMAL /LPF
NITRITE UR QL STRIP.AUTO: NEGATIVE
PH UR STRIP.AUTO: 7 [PH] (ref 5–8)
POTASSIUM SERPL-SCNC: 3.3 MMOL/L (ref 3.5–5.1)
POTASSIUM SERPL-SCNC: 3.5 MMOL/L (ref 3.5–5.1)
PROT UR STRIP.AUTO-MCNC: ABNORMAL MG/DL
RBC #/AREA URNS HPF: ABNORMAL /HPF (ref 0–4)
SODIUM SERPL-SCNC: 137 MMOL/L (ref 136–145)
SODIUM SERPL-SCNC: 138 MMOL/L (ref 136–145)
SP GR UR STRIP.AUTO: 1.02 (ref 1–1.03)
UA COMPLETE W REFLEX CULTURE PNL UR: ABNORMAL
UA DIPSTICK W REFLEX MICRO PNL UR: YES
URN SPEC COLLECT METH UR: ABNORMAL
UROBILINOGEN UR STRIP-ACNC: 0.2 E.U./DL
WBC #/AREA URNS HPF: ABNORMAL /HPF (ref 0–5)

## 2024-09-30 PROCEDURE — 83605 ASSAY OF LACTIC ACID: CPT

## 2024-09-30 PROCEDURE — 80177 DRUG SCRN QUAN LEVETIRACETAM: CPT

## 2024-09-30 PROCEDURE — 81001 URINALYSIS AUTO W/SCOPE: CPT

## 2024-09-30 PROCEDURE — 80048 BASIC METABOLIC PNL TOTAL CA: CPT

## 2024-09-30 PROCEDURE — 93005 ELECTROCARDIOGRAM TRACING: CPT | Performed by: EMERGENCY MEDICINE

## 2024-09-30 PROCEDURE — 83735 ASSAY OF MAGNESIUM: CPT

## 2024-09-30 PROCEDURE — 2580000003 HC RX 258: Performed by: EMERGENCY MEDICINE

## 2024-09-30 PROCEDURE — 96360 HYDRATION IV INFUSION INIT: CPT

## 2024-09-30 RX ORDER — DIAZEPAM 10 MG/2G
5 GEL RECTAL
Qty: 1 EACH | Refills: 0 | Status: SHIPPED | OUTPATIENT
Start: 2024-09-30 | End: 2024-09-30

## 2024-09-30 RX ORDER — SODIUM CHLORIDE, SODIUM LACTATE, POTASSIUM CHLORIDE, AND CALCIUM CHLORIDE .6; .31; .03; .02 G/100ML; G/100ML; G/100ML; G/100ML
1000 INJECTION, SOLUTION INTRAVENOUS ONCE
Status: COMPLETED | OUTPATIENT
Start: 2024-09-30 | End: 2024-09-30

## 2024-09-30 RX ADMIN — SODIUM CHLORIDE, POTASSIUM CHLORIDE, SODIUM LACTATE AND CALCIUM CHLORIDE 1000 ML: 600; 310; 30; 20 INJECTION, SOLUTION INTRAVENOUS at 00:52

## 2024-09-30 NOTE — ED NOTES
Pt discharged from ED in stable condition. Discharge instruction explain, all question answered. Prescription given. Pt walked to lobby independently.       Tom Mccann, RN  09/30/24 [id number]

## 2024-09-30 NOTE — ED TRIAGE NOTES
Patient arrived in the ER with c/o seizure. EMS got a call for a seizure . Patient was awake and talking with ems. Patient had a seizure during transport to hospital.

## 2024-09-30 NOTE — ED NOTES
Patient solid in stool. Patient received a full lining change.      Tom Mccann, RN  09/29/24 7466

## 2024-09-30 NOTE — ED NOTES
Second lactic and BMP drawn and sent at this time. Urine collected at this time.      Patricia Bal  09/30/24 0231

## 2024-09-30 NOTE — DISCHARGE INSTR - COC
Continuity of Care Form    Patient Name: Erwin Doss   :  1970  MRN:  [id number]    Admit date:  2024  Discharge date:  ***    Code Status Order: Prior   Advance Directives:   Advance Care Flowsheet Documentation             Admitting Physician:  No admitting provider for patient encounter.  PCP: Javier Chavez MD    Discharging Nurse: ***  Discharging Hospital Unit/Room#: A07/A07-07  Discharging Unit Phone Number: ***    Emergency Contact:   Extended Emergency Contact Information  Primary Emergency Contact: Annette Singh  Home Phone: [phone]  Relation: Other    Past Surgical History:  Past Surgical History:   Procedure Laterality Date    CRANIOTOMY Right 2023    RIGHT TEMPORAL CRANIOTOMY FOR RESECTION OF BRAIN MASS performed by Saul White MD at [facility] OR    CRANIOTOMY Right 2024    RIGHT TEMPORAL CRANIOTOMY FOR RESECTION OF GLIOBLASTOMA WITH GLEOLAN ASSISTANCE AND GAMMA TILE PLACEMENT performed by Saul White MD at [facility] OR       Immunization History:   Immunization History   Administered Date(s) Administered    COVID-19, PFIZER PURPLE top, DILUTE for use, (age 12 y+), 30mcg/0.3mL 2021, 2021    TDaP, ADACEL (age 10y-64y), BOOSTRIX (age 10y+), IM, 0.5mL 2021       Active Problems:  Patient Active Problem List   Diagnosis Code    Seizure (HCC) R56.9    Encephalitis G04.90    Abnormal brain MRI R90.89    Primary hypertension I10    Smoking F17.200    Cannabis dependence (HCC) F12.20    Alcohol use Z78.9    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

## 2024-09-30 NOTE — ED PROVIDER NOTES
years ago. His smoking use included cigarettes. His smokeless tobacco use includes snuff. He reports that he does not currently use alcohol after a past usage of about 6.0 standard drinks of alcohol per week. He reports current drug use. Drug: Marijuana (Weed).    Medications     Previous Medications    LACOSAMIDE (VIMPAT) 200 MG TABLET    Take 1 tablet by mouth 2 times daily for 90 days. Max Daily Amount: 400 mg    LEVETIRACETAM (KEPPRA) 1000 MG TABLET    Take 2 tablets by mouth 2 times daily    METHOCARBAMOL (ROBAXIN) 500 MG TABLET    Take 1 tablet by mouth 4 times daily    NIFEDIPINE (ADALAT CC) 30 MG EXTENDED RELEASE TABLET    Take 1 tablet by mouth daily    NONFORMULARY    SMOKES MEDICAL MARIJUANA    ONDANSETRON (ZOFRAN-ODT) 4 MG DISINTEGRATING TABLET    Take 1 tablet by mouth every 12 hours as needed for Nausea or Vomiting    PANTOPRAZOLE (PROTONIX) 40 MG TABLET    Take 1 tablet by mouth every morning (before breakfast) for 14 days    SENNOSIDES-DOCUSATE SODIUM (SENOKOT-S) 8.6-50 MG TABLET    Take 2 tablets by mouth 2 times daily as needed for Constipation       Allergies     He is allergic to iodine.    Physical Exam     INITIAL VITALS: BP: 114/80, Temp: 98.3 °F (36.8 °C), Pulse: 98, Respirations: 20, SpO2: 93 %   Physical Exam    General: Postictal, agitated adult male, moving all extremities  HEENT:  Normocephalic, atraumatic, PERRL, extra-ocular movements intact, oropharynx benign  Neck:  Supple, trachea midline   pulmonary:   no increased work of breathing or accessory muscle use during respiration  Cardiac: Intermittently tachycardic  Abdomen: Nondistended  Musculoskeletal:  2+ pulses, no edema or clubbing  Skin:  No concerning rashes or lesions, no cyanosis  Neuro: Moving extremities, not able to participate in neurological examination  Psych: Unable to assess                Iraj Velez MD  09/30/24 [id number]

## 2024-10-27 ENCOUNTER — APPOINTMENT (OUTPATIENT)
Dept: CT IMAGING | Age: 54
End: 2024-10-27
Payer: COMMERCIAL

## 2024-10-27 ENCOUNTER — HOSPITAL ENCOUNTER (EMERGENCY)
Age: 54
Discharge: HOME | End: 2024-10-27
Payer: COMMERCIAL

## 2024-10-27 VITALS
HEART RATE: 64 BPM | DIASTOLIC BLOOD PRESSURE: 85 MMHG | TEMPERATURE: 98.2 F | BODY MASS INDEX: 25.05 KG/M2 | SYSTOLIC BLOOD PRESSURE: 155 MMHG | OXYGEN SATURATION: 100 % | HEIGHT: 70 IN | WEIGHT: 175 LBS | RESPIRATION RATE: 14 BRPM

## 2024-10-27 DIAGNOSIS — G40.919 BREAKTHROUGH SEIZURE (HCC): Primary | ICD-10-CM

## 2024-10-27 LAB
ALBUMIN SERPL-MCNC: 3.5 G/DL (ref 3.4–5)
ALBUMIN/GLOB SERPL: 1.3 {RATIO} (ref 1.1–2.2)
ALP SERPL-CCNC: 73 U/L (ref 40–129)
ALT SERPL-CCNC: 12 U/L (ref 10–40)
ANION GAP SERPL CALCULATED.3IONS-SCNC: 7 MMOL/L (ref 3–16)
ANION GAP SERPL CALCULATED.3IONS-SCNC: 7 MMOL/L (ref 3–16)
AST SERPL-CCNC: 36 U/L (ref 15–37)
BASOPHILS # BLD: 0 K/UL (ref 0–0.2)
BASOPHILS NFR BLD: 1.2 %
BILIRUB SERPL-MCNC: 0.5 MG/DL (ref 0–1)
BUN SERPL-MCNC: 8 MG/DL (ref 7–20)
BUN SERPL-MCNC: 9 MG/DL (ref 7–20)
CALCIUM SERPL-MCNC: 8.4 MG/DL (ref 8.3–10.6)
CALCIUM SERPL-MCNC: 8.6 MG/DL (ref 8.3–10.6)
CHLORIDE SERPL-SCNC: 110 MMOL/L (ref 99–110)
CHLORIDE SERPL-SCNC: 110 MMOL/L (ref 99–110)
CO2 SERPL-SCNC: 22 MMOL/L (ref 21–32)
CO2 SERPL-SCNC: 24 MMOL/L (ref 21–32)
CREAT SERPL-MCNC: 0.8 MG/DL (ref 0.9–1.3)
CREAT SERPL-MCNC: 0.9 MG/DL (ref 0.9–1.3)
DEPRECATED RDW RBC AUTO: 14 % (ref 12.4–15.4)
EOSINOPHIL # BLD: 0.1 K/UL (ref 0–0.6)
EOSINOPHIL NFR BLD: 3.5 %
GFR SERPLBLD CREATININE-BSD FMLA CKD-EPI: >90 ML/MIN/{1.73_M2}
GFR SERPLBLD CREATININE-BSD FMLA CKD-EPI: >90 ML/MIN/{1.73_M2}
GLUCOSE SERPL-MCNC: 77 MG/DL (ref 70–99)
GLUCOSE SERPL-MCNC: 91 MG/DL (ref 70–99)
HCT VFR BLD AUTO: 41 % (ref 40.5–52.5)
HGB BLD-MCNC: 14 G/DL (ref 13.5–17.5)
LEVETIRACETAM SERPL-MCNC: 45.3 UG/ML (ref 6–46)
LYMPHOCYTES # BLD: 0.3 K/UL (ref 1–5.1)
LYMPHOCYTES NFR BLD: 9.6 %
MCH RBC QN AUTO: 31.9 PG (ref 26–34)
MCHC RBC AUTO-ENTMCNC: 34.2 G/DL (ref 31–36)
MCV RBC AUTO: 93.1 FL (ref 80–100)
MEDICATION DOSE-MCNC: NORMAL
MONOCYTES # BLD: 0.3 K/UL (ref 0–1.3)
MONOCYTES NFR BLD: 9.6 %
NEUTROPHILS # BLD: 2.6 K/UL (ref 1.7–7.7)
NEUTROPHILS NFR BLD: 76.1 %
PLATELET # BLD AUTO: 206 K/UL (ref 135–450)
PMV BLD AUTO: 6.8 FL (ref 5–10.5)
POTASSIUM SERPL-SCNC: 4.7 MMOL/L (ref 3.5–5.1)
POTASSIUM SERPL-SCNC: 5.8 MMOL/L (ref 3.5–5.1)
PROT SERPL-MCNC: 6.3 G/DL (ref 6.4–8.2)
RBC # BLD AUTO: 4.4 M/UL (ref 4.2–5.9)
SODIUM SERPL-SCNC: 139 MMOL/L (ref 136–145)
SODIUM SERPL-SCNC: 141 MMOL/L (ref 136–145)
WBC # BLD AUTO: 3.4 K/UL (ref 4–11)

## 2024-10-27 PROCEDURE — 85025 COMPLETE CBC W/AUTO DIFF WBC: CPT

## 2024-10-27 PROCEDURE — 80053 COMPREHEN METABOLIC PANEL: CPT

## 2024-10-27 PROCEDURE — 6360000002 HC RX W HCPCS

## 2024-10-27 PROCEDURE — 6370000000 HC RX 637 (ALT 250 FOR IP)

## 2024-10-27 PROCEDURE — 99284 EMERGENCY DEPT VISIT MOD MDM: CPT

## 2024-10-27 PROCEDURE — 70450 CT HEAD/BRAIN W/O DYE: CPT

## 2024-10-27 PROCEDURE — 96374 THER/PROPH/DIAG INJ IV PUSH: CPT

## 2024-10-27 PROCEDURE — 93005 ELECTROCARDIOGRAM TRACING: CPT

## 2024-10-27 PROCEDURE — 80177 DRUG SCRN QUAN LEVETIRACETAM: CPT

## 2024-10-27 RX ORDER — LORAZEPAM 0.5 MG/1
TABLET ORAL
Qty: 6 TABLET | Refills: 0 | Status: SHIPPED | OUTPATIENT
Start: 2024-10-27 | End: 2024-10-29

## 2024-10-27 RX ORDER — LORAZEPAM 1 MG/1
1 TABLET ORAL ONCE
Status: COMPLETED | OUTPATIENT
Start: 2024-10-27 | End: 2024-10-27

## 2024-10-27 RX ORDER — LEVETIRACETAM 500 MG/5ML
1000 INJECTION, SOLUTION, CONCENTRATE INTRAVENOUS EVERY 12 HOURS
Status: DISCONTINUED | OUTPATIENT
Start: 2024-10-27 | End: 2024-10-27 | Stop reason: HOSPADM

## 2024-10-27 RX ADMIN — LEVETIRACETAM 1000 MG: 100 INJECTION INTRAVENOUS at 13:07

## 2024-10-27 RX ADMIN — LORAZEPAM 1 MG: 1 TABLET ORAL at 15:34

## 2024-10-27 ASSESSMENT — PAIN SCALES - GENERAL: PAINLEVEL_OUTOF10: 0

## 2024-10-27 NOTE — ED PROVIDER NOTES
[facility]  EMERGENCY DEPARTMENT ENCOUNTER          ATTENDING PHYSICIAN NOTE       Date of evaluation: 10/27/2024    Chief Complaint     Seizures (Pt had a witnessed seizure by his wife at 10:45 that lasted for 3 minutes. Pt has a hx of brain tumor removed. PT takes keppra. No meds were given.)      History of Present Illness     Erwin Doss is a 54 y.o. male with a past medical history of GBM status postresection 1 month ago who presents for breakthrough seizure.  Patient had a witnessed 3-minute seizure this morning at 1045.  EMS was called and came here.  He did not require abortive medications.  He feels at his neurologic baseline now.  He denies skipping any of his antiepileptic medications.  He is on both Keppra and Vimpat.  No drug or alcohol use currently he denies any chest pain, shortness breath, abdominal pain, nausea, emesis, paresthesias, new tingling numbness or weakness.  No tongue laceration, blood in the mouth, loss of urine or bowel incontinence.    ASSESSMENT / PLAN  (MEDICAL DECISION MAKING)     INITIAL VITALS: BP: (!) 153/90, Temp: 98.2 °F (36.8 °C), Pulse: 61, Respirations: 18, SpO2: 100 %      Erwin Doss is a 54 y.o. male with breakthrough seizure.  Due to recent craniotomy we will order head CT to rule out any intracranial hemorrhage causing this breakthrough seizure.  He typically had seizures once per month but he has now had them in back-to-back weeks.  Will measure Keppra level.  Plan to consult neurology for any adjustments in his seizure medications and arrange for close follow-up.  He has no overt signs of infection.  I do not believe there is any physiologic cause of lowering the seizure threshold and needs to be managed at this point in time.  After CT read there is concern for a possible interval hemorrhage in the subdural which is already known.  I did speak to the neurosurgeon on-call.  They did not recommend any intervention and felt that the patient was safe for

## 2024-10-27 NOTE — DISCHARGE INSTRUCTIONS
You had a breakthrough seizure today.  You were given an additional dose of Keppra here and a dose of Ativan.  Neurology recommended that you do an Ativan taper over the next 3 days with 3 doses, 2 doses, 1 dose.  Your white blood cell count is mildly decreased.  Follow-up with your oncologist and PCP.  Call your PCP, neurologist, neurosurgeon first thing in the morning for quick follow-up from your emergency department visit.  Return to the emergency department if you have any new or worsening symptoms, falls, seizures, new tingling, numbness, weakness, inability to eat or drink or other concerns.

## 2024-10-28 LAB
EKG ATRIAL RATE: 62 BPM
EKG DIAGNOSIS: NORMAL
EKG P AXIS: 77 DEGREES
EKG P-R INTERVAL: 122 MS
EKG Q-T INTERVAL: 392 MS
EKG QRS DURATION: 84 MS
EKG QTC CALCULATION (BAZETT): 397 MS
EKG R AXIS: 64 DEGREES
EKG T AXIS: 73 DEGREES
EKG VENTRICULAR RATE: 62 BPM

## 2024-11-03 ENCOUNTER — APPOINTMENT (OUTPATIENT)
Dept: CT IMAGING | Age: 54
DRG: 055 | End: 2024-11-03
Payer: COMMERCIAL

## 2024-11-03 ENCOUNTER — HOSPITAL ENCOUNTER (INPATIENT)
Age: 54
LOS: 1 days | Discharge: HOME | DRG: 055 | End: 2024-11-05
Attending: EMERGENCY MEDICINE | Admitting: INTERNAL MEDICINE
Payer: COMMERCIAL

## 2024-11-03 ENCOUNTER — APPOINTMENT (OUTPATIENT)
Dept: GENERAL RADIOLOGY | Age: 54
DRG: 055 | End: 2024-11-03
Payer: COMMERCIAL

## 2024-11-03 DIAGNOSIS — R41.82 ALTERED MENTAL STATUS, UNSPECIFIED ALTERED MENTAL STATUS TYPE: Primary | ICD-10-CM

## 2024-11-03 LAB
ANION GAP SERPL CALCULATED.3IONS-SCNC: 12 MMOL/L (ref 3–16)
BASE EXCESS BLDV CALC-SCNC: 1.3 MMOL/L (ref -2–3)
BASOPHILS # BLD: 0 K/UL (ref 0–0.2)
BASOPHILS NFR BLD: 0.9 %
BUN SERPL-MCNC: 12 MG/DL (ref 7–20)
CALCIUM SERPL-MCNC: 10.1 MG/DL (ref 8.3–10.6)
CHLORIDE SERPL-SCNC: 103 MMOL/L (ref 99–110)
CO2 BLDV-SCNC: 29 MMOL/L
CO2 SERPL-SCNC: 26 MMOL/L (ref 21–32)
COHGB MFR BLDV: 1.4 % (ref 0–1.5)
CREAT SERPL-MCNC: 1.2 MG/DL (ref 0.9–1.3)
DEPRECATED RDW RBC AUTO: 14.1 % (ref 12.4–15.4)
DO-HGB MFR BLDV: 27.8 %
EOSINOPHIL # BLD: 0.1 K/UL (ref 0–0.6)
EOSINOPHIL NFR BLD: 2.9 %
GFR SERPLBLD CREATININE-BSD FMLA CKD-EPI: 72 ML/MIN/{1.73_M2}
GLUCOSE SERPL-MCNC: 103 MG/DL (ref 70–99)
HCO3 BLDV-SCNC: 27.1 MMOL/L (ref 24–28)
HCT VFR BLD AUTO: 46.3 % (ref 40.5–52.5)
HGB BLD-MCNC: 16.2 G/DL (ref 13.5–17.5)
LACTATE BLDV-SCNC: 1.1 MMOL/L (ref 0.4–2)
LYMPHOCYTES # BLD: 0.3 K/UL (ref 1–5.1)
LYMPHOCYTES NFR BLD: 13.3 %
MCH RBC QN AUTO: 31.7 PG (ref 26–34)
MCHC RBC AUTO-ENTMCNC: 35 G/DL (ref 31–36)
MCV RBC AUTO: 90.6 FL (ref 80–100)
METHGB MFR BLDV: 0.4 % (ref 0–1.5)
MONOCYTES # BLD: 0.3 K/UL (ref 0–1.3)
MONOCYTES NFR BLD: 11.2 %
NEUTROPHILS # BLD: 1.9 K/UL (ref 1.7–7.7)
NEUTROPHILS NFR BLD: 71.7 %
PCO2 BLDV: 45.7 MMHG (ref 41–51)
PH BLDV: 7.38 [PH] (ref 7.35–7.45)
PLATELET # BLD AUTO: 202 K/UL (ref 135–450)
PMV BLD AUTO: 6.7 FL (ref 5–10.5)
PO2 BLDV: 39.4 MMHG (ref 25–40)
POTASSIUM SERPL-SCNC: 3.7 MMOL/L (ref 3.5–5.1)
RBC # BLD AUTO: 5.11 M/UL (ref 4.2–5.9)
SAO2 % BLDV: 72 %
SODIUM SERPL-SCNC: 141 MMOL/L (ref 136–145)
WBC # BLD AUTO: 2.6 K/UL (ref 4–11)

## 2024-11-03 PROCEDURE — 99285 EMERGENCY DEPT VISIT HI MDM: CPT

## 2024-11-03 PROCEDURE — 36415 COLL VENOUS BLD VENIPUNCTURE: CPT

## 2024-11-03 PROCEDURE — 80177 DRUG SCRN QUAN LEVETIRACETAM: CPT

## 2024-11-03 PROCEDURE — 6360000004 HC RX CONTRAST MEDICATION: Performed by: EMERGENCY MEDICINE

## 2024-11-03 PROCEDURE — 96375 TX/PRO/DX INJ NEW DRUG ADDON: CPT

## 2024-11-03 PROCEDURE — 80048 BASIC METABOLIC PNL TOTAL CA: CPT

## 2024-11-03 PROCEDURE — 82550 ASSAY OF CK (CPK): CPT

## 2024-11-03 PROCEDURE — 83605 ASSAY OF LACTIC ACID: CPT

## 2024-11-03 PROCEDURE — 82803 BLOOD GASES ANY COMBINATION: CPT

## 2024-11-03 PROCEDURE — 70498 CT ANGIOGRAPHY NECK: CPT

## 2024-11-03 PROCEDURE — 85025 COMPLETE CBC W/AUTO DIFF WBC: CPT

## 2024-11-03 PROCEDURE — 6360000002 HC RX W HCPCS: Performed by: EMERGENCY MEDICINE

## 2024-11-03 PROCEDURE — 96374 THER/PROPH/DIAG INJ IV PUSH: CPT

## 2024-11-03 PROCEDURE — 71046 X-RAY EXAM CHEST 2 VIEWS: CPT

## 2024-11-03 PROCEDURE — 2580000003 HC RX 258: Performed by: EMERGENCY MEDICINE

## 2024-11-03 PROCEDURE — 70450 CT HEAD/BRAIN W/O DYE: CPT

## 2024-11-03 RX ORDER — DIPHENHYDRAMINE HYDROCHLORIDE 50 MG/ML
25 INJECTION INTRAMUSCULAR; INTRAVENOUS ONCE
Status: COMPLETED | OUTPATIENT
Start: 2024-11-03 | End: 2024-11-03

## 2024-11-03 RX ORDER — IOPAMIDOL 755 MG/ML
75 INJECTION, SOLUTION INTRAVASCULAR
Status: COMPLETED | OUTPATIENT
Start: 2024-11-03 | End: 2024-11-03

## 2024-11-03 RX ORDER — LORAZEPAM 2 MG/ML
0.5 INJECTION INTRAMUSCULAR ONCE
Status: COMPLETED | OUTPATIENT
Start: 2024-11-03 | End: 2024-11-03

## 2024-11-03 RX ADMIN — WATER 125 MG: 1 INJECTION INTRAMUSCULAR; INTRAVENOUS; SUBCUTANEOUS at 21:25

## 2024-11-03 RX ADMIN — DIPHENHYDRAMINE HYDROCHLORIDE 25 MG: 50 INJECTION INTRAMUSCULAR; INTRAVENOUS at 21:25

## 2024-11-03 RX ADMIN — LORAZEPAM 0.5 MG: 2 INJECTION INTRAMUSCULAR; INTRAVENOUS at 21:25

## 2024-11-03 RX ADMIN — IOPAMIDOL 75 ML: 755 INJECTION, SOLUTION INTRAVENOUS at 22:41

## 2024-11-03 ASSESSMENT — PAIN - FUNCTIONAL ASSESSMENT: PAIN_FUNCTIONAL_ASSESSMENT: NONE - DENIES PAIN

## 2024-11-04 ENCOUNTER — APPOINTMENT (OUTPATIENT)
Dept: MRI IMAGING | Age: 54
DRG: 055 | End: 2024-11-04
Payer: COMMERCIAL

## 2024-11-04 PROBLEM — G93.40 ENCEPHALOPATHY ACUTE: Status: ACTIVE | Noted: 2024-11-04

## 2024-11-04 LAB
ALBUMIN SERPL-MCNC: 4.5 G/DL (ref 3.4–5)
AMORPH SED URNS QL MICRO: ABNORMAL /HPF
ANION GAP SERPL CALCULATED.3IONS-SCNC: 12 MMOL/L (ref 3–16)
BACTERIA URNS QL MICRO: ABNORMAL /HPF
BASOPHILS # BLD: 0 K/UL (ref 0–0.2)
BASOPHILS NFR BLD: 0.2 %
BILIRUB UR QL STRIP.AUTO: ABNORMAL
BUN SERPL-MCNC: 13 MG/DL (ref 7–20)
CALCIUM SERPL-MCNC: 9.8 MG/DL (ref 8.3–10.6)
CHLORIDE SERPL-SCNC: 105 MMOL/L (ref 99–110)
CK SERPL-CCNC: 30 U/L (ref 39–308)
CLARITY UR: CLEAR
CO2 SERPL-SCNC: 21 MMOL/L (ref 21–32)
COLOR UR: YELLOW
CREAT SERPL-MCNC: 1.1 MG/DL (ref 0.9–1.3)
DEPRECATED RDW RBC AUTO: 14.4 % (ref 12.4–15.4)
EOSINOPHIL # BLD: 0 K/UL (ref 0–0.6)
EOSINOPHIL NFR BLD: 0 %
GFR SERPLBLD CREATININE-BSD FMLA CKD-EPI: 80 ML/MIN/{1.73_M2}
GLUCOSE SERPL-MCNC: 146 MG/DL (ref 70–99)
GLUCOSE UR STRIP.AUTO-MCNC: NEGATIVE MG/DL
HCT VFR BLD AUTO: 46.5 % (ref 40.5–52.5)
HGB BLD-MCNC: 16.2 G/DL (ref 13.5–17.5)
HGB UR QL STRIP.AUTO: NEGATIVE
HYALINE CASTS #/AREA URNS LPF: ABNORMAL /LPF (ref 0–2)
KETONES UR STRIP.AUTO-MCNC: ABNORMAL MG/DL
LEUKOCYTE ESTERASE UR QL STRIP.AUTO: NEGATIVE
LEVETIRACETAM SERPL-MCNC: 56.2 UG/ML (ref 6–46)
LEVETIRACETAM SERPL-MCNC: >100 UG/ML (ref 6–46)
LYMPHOCYTES # BLD: 0.3 K/UL (ref 1–5.1)
LYMPHOCYTES NFR BLD: 8.4 %
MAGNESIUM SERPL-MCNC: 2.1 MG/DL (ref 1.8–2.4)
MCH RBC QN AUTO: 31.7 PG (ref 26–34)
MCHC RBC AUTO-ENTMCNC: 34.8 G/DL (ref 31–36)
MCV RBC AUTO: 91.1 FL (ref 80–100)
MEDICATION DOSE-MCNC: ABNORMAL
MEDICATION DOSE-MCNC: ABNORMAL
MEDICATION DOSE-MCNC: NORMAL
MONOCYTES # BLD: 0.1 K/UL (ref 0–1.3)
MONOCYTES NFR BLD: 3.5 %
MUCOUS THREADS #/AREA URNS LPF: ABNORMAL /LPF
NEUTROPHILS # BLD: 3.2 K/UL (ref 1.7–7.7)
NEUTROPHILS NFR BLD: 87.9 %
NITRITE UR QL STRIP.AUTO: NEGATIVE
PH UR STRIP.AUTO: 6 [PH] (ref 5–8)
PHOSPHATE SERPL-MCNC: 3.9 MG/DL (ref 2.5–4.9)
PLATELET # BLD AUTO: 222 K/UL (ref 135–450)
PMV BLD AUTO: 7 FL (ref 5–10.5)
POTASSIUM SERPL-SCNC: 4.7 MMOL/L (ref 3.5–5.1)
PROT UR STRIP.AUTO-MCNC: ABNORMAL MG/DL
RBC # BLD AUTO: 5.11 M/UL (ref 4.2–5.9)
RBC #/AREA URNS HPF: ABNORMAL /HPF (ref 0–4)
SODIUM SERPL-SCNC: 138 MMOL/L (ref 136–145)
SP GR UR STRIP.AUTO: 1.02 (ref 1–1.03)
UA DIPSTICK W REFLEX MICRO PNL UR: YES
URN SPEC COLLECT METH UR: ABNORMAL
UROBILINOGEN UR STRIP-ACNC: 0.2 E.U./DL
WBC # BLD AUTO: 3.7 K/UL (ref 4–11)
WBC #/AREA URNS HPF: ABNORMAL /HPF (ref 0–5)

## 2024-11-04 PROCEDURE — 99222 1ST HOSP IP/OBS MODERATE 55: CPT

## 2024-11-04 PROCEDURE — 6360000004 HC RX CONTRAST MEDICATION: Performed by: NURSE PRACTITIONER

## 2024-11-04 PROCEDURE — 81001 URINALYSIS AUTO W/SCOPE: CPT

## 2024-11-04 PROCEDURE — 6360000002 HC RX W HCPCS

## 2024-11-04 PROCEDURE — A9576 INJ PROHANCE MULTIPACK: HCPCS | Performed by: NURSE PRACTITIONER

## 2024-11-04 PROCEDURE — 2580000003 HC RX 258

## 2024-11-04 PROCEDURE — 80069 RENAL FUNCTION PANEL: CPT

## 2024-11-04 PROCEDURE — 80177 DRUG SCRN QUAN LEVETIRACETAM: CPT

## 2024-11-04 PROCEDURE — 2060000000 HC ICU INTERMEDIATE R&B

## 2024-11-04 PROCEDURE — 80235 DRUG ASSAY LACOSAMIDE: CPT

## 2024-11-04 PROCEDURE — 70553 MRI BRAIN STEM W/O & W/DYE: CPT

## 2024-11-04 PROCEDURE — 6370000000 HC RX 637 (ALT 250 FOR IP)

## 2024-11-04 PROCEDURE — 85025 COMPLETE CBC W/AUTO DIFF WBC: CPT

## 2024-11-04 PROCEDURE — 83735 ASSAY OF MAGNESIUM: CPT

## 2024-11-04 PROCEDURE — 36415 COLL VENOUS BLD VENIPUNCTURE: CPT

## 2024-11-04 RX ORDER — ONDANSETRON 2 MG/ML
4 INJECTION INTRAMUSCULAR; INTRAVENOUS EVERY 6 HOURS PRN
Status: DISCONTINUED | OUTPATIENT
Start: 2024-11-04 | End: 2024-11-05 | Stop reason: HOSPADM

## 2024-11-04 RX ORDER — METHOCARBAMOL 500 MG/1
500 TABLET, FILM COATED ORAL 4 TIMES DAILY
Status: DISCONTINUED | OUTPATIENT
Start: 2024-11-04 | End: 2024-11-05 | Stop reason: HOSPADM

## 2024-11-04 RX ORDER — SODIUM CHLORIDE 9 MG/ML
INJECTION, SOLUTION INTRAVENOUS PRN
Status: DISCONTINUED | OUTPATIENT
Start: 2024-11-04 | End: 2024-11-05 | Stop reason: HOSPADM

## 2024-11-04 RX ORDER — POLYETHYLENE GLYCOL 3350 17 G/17G
17 POWDER, FOR SOLUTION ORAL DAILY PRN
Status: DISCONTINUED | OUTPATIENT
Start: 2024-11-04 | End: 2024-11-05 | Stop reason: HOSPADM

## 2024-11-04 RX ORDER — ENOXAPARIN SODIUM 100 MG/ML
40 INJECTION SUBCUTANEOUS DAILY
Status: DISCONTINUED | OUTPATIENT
Start: 2024-11-04 | End: 2024-11-05 | Stop reason: HOSPADM

## 2024-11-04 RX ORDER — SENNA AND DOCUSATE SODIUM 50; 8.6 MG/1; MG/1
2 TABLET, FILM COATED ORAL 2 TIMES DAILY PRN
Status: DISCONTINUED | OUTPATIENT
Start: 2024-11-04 | End: 2024-11-05 | Stop reason: HOSPADM

## 2024-11-04 RX ORDER — LACOSAMIDE 50 MG/1
200 TABLET ORAL 2 TIMES DAILY
Status: DISCONTINUED | OUTPATIENT
Start: 2024-11-04 | End: 2024-11-05 | Stop reason: HOSPADM

## 2024-11-04 RX ORDER — ACETAMINOPHEN 650 MG/1
650 SUPPOSITORY RECTAL EVERY 6 HOURS PRN
Status: DISCONTINUED | OUTPATIENT
Start: 2024-11-04 | End: 2024-11-05 | Stop reason: HOSPADM

## 2024-11-04 RX ORDER — ACETAMINOPHEN 325 MG/1
650 TABLET ORAL EVERY 6 HOURS PRN
Status: DISCONTINUED | OUTPATIENT
Start: 2024-11-04 | End: 2024-11-05 | Stop reason: HOSPADM

## 2024-11-04 RX ORDER — SODIUM CHLORIDE 0.9 % (FLUSH) 0.9 %
5-40 SYRINGE (ML) INJECTION PRN
Status: DISCONTINUED | OUTPATIENT
Start: 2024-11-04 | End: 2024-11-05 | Stop reason: HOSPADM

## 2024-11-04 RX ORDER — ONDANSETRON 4 MG/1
4 TABLET, ORALLY DISINTEGRATING ORAL EVERY 8 HOURS PRN
Status: DISCONTINUED | OUTPATIENT
Start: 2024-11-04 | End: 2024-11-05 | Stop reason: HOSPADM

## 2024-11-04 RX ORDER — LORAZEPAM 2 MG/ML
4 INJECTION INTRAMUSCULAR EVERY 5 MIN PRN
Status: DISCONTINUED | OUTPATIENT
Start: 2024-11-04 | End: 2024-11-05 | Stop reason: HOSPADM

## 2024-11-04 RX ORDER — SODIUM CHLORIDE 0.9 % (FLUSH) 0.9 %
5-40 SYRINGE (ML) INJECTION EVERY 12 HOURS SCHEDULED
Status: DISCONTINUED | OUTPATIENT
Start: 2024-11-04 | End: 2024-11-05 | Stop reason: HOSPADM

## 2024-11-04 RX ADMIN — SODIUM CHLORIDE, PRESERVATIVE FREE 10 ML: 5 INJECTION INTRAVENOUS at 13:03

## 2024-11-04 RX ADMIN — METHOCARBAMOL 500 MG: 500 TABLET ORAL at 08:33

## 2024-11-04 RX ADMIN — LACOSAMIDE 200 MG: 50 TABLET, FILM COATED ORAL at 19:52

## 2024-11-04 RX ADMIN — SODIUM CHLORIDE, PRESERVATIVE FREE 10 ML: 5 INJECTION INTRAVENOUS at 08:37

## 2024-11-04 RX ADMIN — ENOXAPARIN SODIUM 40 MG: 100 INJECTION SUBCUTANEOUS at 08:34

## 2024-11-04 RX ADMIN — METHOCARBAMOL 500 MG: 500 TABLET ORAL at 13:01

## 2024-11-04 RX ADMIN — GADOTERIDOL 15 ML: 279.3 INJECTION, SOLUTION INTRAVENOUS at 17:21

## 2024-11-04 RX ADMIN — LACOSAMIDE 200 MG: 50 TABLET, FILM COATED ORAL at 08:33

## 2024-11-04 RX ADMIN — METHOCARBAMOL 500 MG: 500 TABLET ORAL at 19:52

## 2024-11-04 RX ADMIN — LEVETIRACETAM 2000 MG: 750 TABLET, FILM COATED ORAL at 08:33

## 2024-11-04 RX ADMIN — SODIUM CHLORIDE, PRESERVATIVE FREE 10 ML: 5 INJECTION INTRAVENOUS at 19:54

## 2024-11-04 RX ADMIN — METHOCARBAMOL 500 MG: 500 TABLET ORAL at 18:24

## 2024-11-04 RX ADMIN — LEVETIRACETAM 2000 MG: 750 TABLET, FILM COATED ORAL at 19:53

## 2024-11-04 ASSESSMENT — PAIN DESCRIPTION - FREQUENCY
FREQUENCY: CONTINUOUS
FREQUENCY: CONTINUOUS

## 2024-11-04 ASSESSMENT — PAIN DESCRIPTION - DESCRIPTORS
DESCRIPTORS: ACHING
DESCRIPTORS: ACHING

## 2024-11-04 ASSESSMENT — PAIN DESCRIPTION - PAIN TYPE
TYPE: CHRONIC PAIN
TYPE: CHRONIC PAIN

## 2024-11-04 ASSESSMENT — PAIN DESCRIPTION - LOCATION
LOCATION: HEAD
LOCATION: HEAD

## 2024-11-04 ASSESSMENT — PAIN SCALES - GENERAL
PAINLEVEL_OUTOF10: 7
PAINLEVEL_OUTOF10: 8

## 2024-11-04 ASSESSMENT — PAIN DESCRIPTION - ORIENTATION
ORIENTATION: RIGHT
ORIENTATION: RIGHT

## 2024-11-04 ASSESSMENT — PAIN - FUNCTIONAL ASSESSMENT
PAIN_FUNCTIONAL_ASSESSMENT: ACTIVITIES ARE NOT PREVENTED
PAIN_FUNCTIONAL_ASSESSMENT: ACTIVITIES ARE NOT PREVENTED

## 2024-11-04 ASSESSMENT — PAIN DESCRIPTION - ONSET
ONSET: ON-GOING
ONSET: ON-GOING

## 2024-11-04 NOTE — ED TRIAGE NOTES
Patient arrived in the ER with c/o focal seizure. Patient states that the seizure happen around 8:00 pm today. Patient states that he has a seizure once a month.

## 2024-11-04 NOTE — CONSULTS
ambulatory aid     Vapes non-nicotine containing substance     Wears glasses         Past Surgical History:   Procedure Laterality Date    CRANIOTOMY Right 2023    RIGHT TEMPORAL CRANIOTOMY FOR RESECTION OF BRAIN MASS performed by Saul White MD at [facility] OR    CRANIOTOMY Right 2024    RIGHT TEMPORAL CRANIOTOMY FOR RESECTION OF GLIOBLASTOMA WITH GLEOLAN ASSISTANCE AND GAMMA TILE PLACEMENT performed by Saul White MD at [facility] OR       Social History     Occupational History    Not on file   Tobacco Use    Smoking status: Former     Current packs/day: 0.00     Types: Cigarettes     Quit date: 2010     Years since quittin.3    Smokeless tobacco: Current     Types: Snuff   Vaping Use    Vaping status: Every Day    Substances: MARIJUANA   Substance and Sexual Activity    Alcohol use: Not Currently     Alcohol/week: 6.0 standard drinks of alcohol     Types: 6 Cans of beer per week     Comment: social    Drug use: Yes     Types: Marijuana (Weed)     Comment: medical    Sexual activity: Not on file        History reviewed. No pertinent family history.     Outpatient Medications Marked as Taking for the 11/3/24 encounter (Hospital Encounter)   Medication Sig Dispense Refill    methocarbamol (ROBAXIN) 500 MG tablet Take 1 tablet by mouth 4 times daily      lacosamide (VIMPAT) 200 MG tablet Take 1 tablet by mouth 2 times daily for 90 days. Max Daily Amount: 400 mg 60 tablet 2    levETIRAcetam (KEPPRA) 1000 MG tablet Take 2 tablets by mouth 2 times daily 60 tablet 3        Current Facility-Administered Medications   Medication Dose Route Frequency Provider Last Rate Last Admin    sodium chloride flush 0.9 % injection 5-40 mL  5-40 mL IntraVENous 2 times per day Eleazar Desouza DO        sodium chloride flush 0.9 % injection 5-40 mL  5-40 mL IntraVENous PRN Eleazar Desouza DO   10 mL at 24 0837    0.9 % sodium chloride infusion   IntraVENous PRN Eleazar Desouza 
Cigarettes     Quit date: 2010     Years since quittin.3    Smokeless tobacco: Current     Types: Snuff   Vaping Use    Vaping status: Every Day    Substances: MARIJUANA   Substance and Sexual Activity    Alcohol use: Not Currently     Alcohol/week: 6.0 standard drinks of alcohol     Types: 6 Cans of beer per week     Comment: social    Drug use: Yes     Types: Marijuana (Weed)     Comment: medical    Sexual activity: Not on file   Other Topics Concern    Not on file   Social History Narrative    Not on file     Social Determinants of Health     Financial Resource Strain: Not on file   Food Insecurity: Food Insecurity Present (2024)    Hunger Vital Sign     Worried About Running Out of Food in the Last Year: Sometimes true     Ran Out of Food in the Last Year: Often true   Transportation Needs: Unmet Transportation Needs (2024)    PRAPARE - Transportation     Lack of Transportation (Medical): Yes     Lack of Transportation (Non-Medical): Yes   Physical Activity: Not on file   Stress: Not on file   Social Connections: Not on file   Intimate Partner Violence: Unknown (2024)    Received from [facility], [facility]    Interpersonal Safety     Feel physically or emotionally unsafe where currently live: Not on file     Harm by anyone: Not on file     Emotionally Harmed: Not on file   Housing Stability: High Risk (2024)    Housing Stability Vital Sign     Unable to Pay for Housing in the Last Year: Yes     Number of Times Moved in the Last Year: 2     Homeless in the Last Year: Yes     Social History     Tobacco Use   Smoking Status Former    Current packs/day: 0.00    Types: Cigarettes    Quit date: 2010    Years since quittin.3   Smokeless Tobacco Current    Types: Snuff     Social History     Substance and Sexual Activity   Drug Use Yes    Types: Marijuana (Weed)    Comment: medical     Social History     Substance and

## 2024-11-04 NOTE — H&P
Internal Medicine H&P    Date:  2024   Patient:  Erwin Doss   Patient :  1970     CC:  Seizures       Source of HPI: Patient, spouse, chart review    Subjective     HPI:  Mr. Erwin Doss is a 54 y.o. male with a MHx significant for high-grade glioma s/p R craniotomy with resection (2023) with recurrence requiring resection and gamma tile placement (2024), HTN, seizure disorder, HTN who presents with reports of seizures.     Patient states he had one of his focal seizures today, which he describes as facial tics like right sided mouth and eye twitching. He also reports feeling shoulder pain when he has these episodes. He denies any new headaches, fever, chills, shortness of breath, cough, dysuria, hematuria, polyuria. This is notably his 4th presentation to the ED for the same issue since . The first time he left AMA before being seen by neurology or neurosurgery. Second time he declined admission. Third time his case was discussed with on call NSGY and the neurology team who recommended outpatient follow up and an Ativan taper.     Spouse says he's been acting weird since 10/31, speech has been slurred. Told his wife today that he thought he was going to have a seizure but she does not report seeing any of his facial tics. Has been taking both his Keppra and his Vimpat, then was being tapered off Ativan with the last dose being on 10/30. After patient has seizures he does usually have left-sided weakness and dysarthria. Doesn't report any other symptoms.    Patient lives at home with his wife and daughter. Reports former alcohol use but nothing in the past 6 months or so. Currently uses tobacco, dip, about one can per day. Denies any current or former illicit substance use including cocaine, amphetamines, and IV drugs.      PMHx:      Diagnosis Date    Glioblastoma (HCC)     History of snuff use     Hx of radiation therapy     Hx of smoking     Hypertension     Marijuana use

## 2024-11-04 NOTE — PLAN OF CARE
Brief note   Patient seen and examined    Erwin Doss is a 53 y/o man with a history of epilepsy, right temporal GBM (s/p resections Nov 2023, Aug 2024), who presents with less than one day of dysarthria and left hand numbness which began acutely yesterday at 3 PM. His wife tells me he was leaning toward the left. His symptoms have improved since then but not resolved. He tells me this is NOT typical for his seizures.     His typical seizures involve facial twitching with behavior arrest and cognitive impairment. At times these have progressed to generalized convulsive seizures but this is much less frequent. He has seizures about once per month. He takes vimpat and Keppra at home. He endorses compliance and his wife confirms this.     Elsewhere documented it is noted that during his seizures he will at times have sensation of numbness on his left side of the body, and the left side goes numb and weak. Again, he tells me the event that brought him to the hospital last night is not consistent with his known seizure disorder.     He gives me a very inconsistent history in comparison to what he has told other providers.    Labs  Lactate 1.1  Glucose 102  CK 30    Studies    CT head  Large area of encephalomalacia in the right middle cranial fossa with craniotomy and surgical clips present. Tiny focus of increased attenuation within the anterior margin of the temporal surgical bed, stable. No evidence of midline shift to the left.   Trace low-attenuation extra-axial fluid seen. Chronic microvascular ischemic changes.     CT-A head and neck  1. Small right M2 branches.  2. Stable small right A1 branch anterior cerebral artery.  3. Small right vertebral artery with mild focal narrowing of the upper portion of the intracranial right vertebral artery.  4. Parenchymal volume loss and chronic small vessel ischemic changes in the white matter.  5. Encephalomalacia deep to right temporal craniectomy with probable small chronic

## 2024-11-04 NOTE — CARE COORDINATION
Case Management Assessment  Initial Evaluation    Date/Time of Evaluation: 11/4/2024 3:34 PM  Assessment Completed by: Annette Win RN    If patient is discharged prior to next notation, then this note serves as note for discharge by case management.    Patient Name: Erwin Doss                   YOB: 1970  Diagnosis: Encephalopathy acute [G93.40]  Altered mental status, unspecified altered mental status type [R41.82]                   Date / Time: 11/3/2024  8:36 PM    Patient Admission Status: Inpatient   Readmission Risk (Low < 19, Mod (19-27), High > 27): Readmission Risk Score: 18.3    Current PCP: Javier Chavez MD  PCP verified by CM? No    Chart Reviewed: Yes      History Provided by: Patient  Patient Orientation: Alert and Oriented    Patient Cognition: Alert    Hospitalization in the last 30 days (Readmission):  No    If yes, Readmission Assessment in CM Navigator will be completed.    Advance Directives:      Code Status: Full Code   Patient's Primary Decision Maker is: Legal Next of Kin    Primary Decision Maker: SamanthaAnnette Cox [place] - [phone]    Discharge Planning:    Patient lives with: Spouse/Significant Other Type of Home: House  Primary Care Giver: Self  Patient Support Systems include: Spouse/Significant Other   Current Financial resources: None  Current community resources: None  Current services prior to admission: None            Current DME:              Type of Home Care services:  None    ADLS  Prior functional level: Independent in ADLs/IADLs  Current functional level: Independent in ADLs/IADLs    PT AM-PAC:   /24  OT AM-PAC:   /24    Family can provide assistance at DC: Yes  Would you like Case Management to discuss the discharge plan with any other family members/significant others, and if so, who? No  Plans to Return to Present Housing: Yes  Other Identified Issues/Barriers to RETURNING to current housing: none  Potential Assistance needed at discharge: N/A

## 2024-11-04 NOTE — PLAN OF CARE
Problem: Safety - Adult  Goal: Free from fall injury  All fall precautions in place. Bed locked and in lowest position with alarm on. Overbed table and personal belonings within reach. Call light within reach and patient instructed to use call light for assistance. Non-skid socks on.    Outcome: Progressing     Problem: Skin/Tissue Integrity  Goal: Absence of new skin breakdown  Description: 1.  Monitor for areas of redness and/or skin breakdown  2.  Assess vascular access sites hourly  3.  Every 4-6 hours minimum:  Change oxygen saturation probe site  4.  Every 4-6 hours:  If on nasal continuous positive airway pressure, respiratory therapy assess nares and determine need for appliance change or resting period.  Outcome: Progressing   Skin assessed this shift. Josselin care completed as necessary. Patient alternating positions to reduce pressure and prevent skin injury q2 and as needed.

## 2024-11-04 NOTE — ED PROVIDER NOTES
Preventive Health Recommendations  Female Ages 40 to 49    Yearly exam:     See your health care provider every year in order to  1. Review health changes.   2. Discuss preventive care.    3. Review your medicines if your doctor prescribed any.      Get a Pap test every three years (unless you have an abnormal result and your provider advises testing more often).      If you get Pap tests with HPV test, you only need to test every 5 years, unless you have an abnormal result. You do not need a Pap test if your uterus was removed (hysterectomy) and you have not had cancer.      You should be tested each year for STDs (sexually transmitted diseases), if you're at risk.       Ask your doctor if you should have a mammogram.      Have a colonoscopy (test for colon cancer) if someone in your family has had colon cancer or polyps before age 50.       Have a cholesterol test every 5 years.       Have a diabetes test (fasting glucose) after age 45. If you are at risk for diabetes, you should have this test every 3 years.    Shots: Get a flu shot each year. Get a tetanus shot every 10 years.     Nutrition:     Eat at least 5 servings of fruits and vegetables each day.    Eat whole-grain bread, whole-wheat pasta and brown rice instead of white grains and rice.    Talk to your provider about Calcium and Vitamin D.     Lifestyle    Exercise at least 150 minutes a week (an average of 30 minutes a day, 5 days a week). This will help you control your weight and prevent disease.    Limit alcohol to one drink per day.    No smoking.     Wear sunscreen to prevent skin cancer.    See your dentist every six months for an exam and cleaning.     THE [facility]  EMERGENCY DEPARTMENT ENCOUNTER          ATTENDING PHYSICIAN NOTE       Date of evaluation: 11/3/2024    Chief Complaint     Seizures      History of Present Illness     Erwin Doss is a 54 y.o. male who presents with a complaint of seizures.  Patient states he has a seizure about once a month.  Today he thought he was having a focal seizure in his left hand.  Specifically states he had some twitching in his left hand and then had some weird sensations between his right and left hands.  Did not lose bowel or bladder continence.  Patient states he recently had a surgery for his brain, he does not know what surgery.  Denies any fevers.  Denies any pain anywhere.    On chart review I can see that he had a GBM resection, this was August 22 of this year.  This was with neurosurgery.  Since then he has had 3 ER visits for breakthrough seizures.  Is on Vimpat and Keppra.  Reports compliance with medications.    I was able to talk to the wife who stated that earlier this evening he was not making much sense and seemed confused.  Also stated that he had slurred speech which she noted around 7 PM.  States for the last few days he has been stumbling and falling to the left.  He told her that he felt like he was going to have a seizure and she decided to call 911.  No actual seizure noted.    ASSESSMENT / PLAN  (MEDICAL DECISION MAKING)     INITIAL VITALS: BP: (!) 159/118, Temp: 99 °F (37.2 °C), Pulse: 98, Respirations: 18, SpO2: 96 %      Erwin Doss is a 54 y.o. male who presents with a chief complaint of seizure.  Initial exam reveals a chronically ill-appearing male in no acute distress with normal vitals other than hypertension, afebrile.  Physical exam remarkable for healed craniotomy scar on the right side of his occiput.  Noted to have some left arm weakness.  Unclear if this is new or old..    Initially just obtained head CT.  Patient is not a tPA candidate due to history of recent surgery

## 2024-11-04 NOTE — ED NOTES
How does patient ambulate?   []Low Fall Risk (ambulates by themselves without support)  []Stand by assist   []Contact Guard   []Front wheel walker  []Wheelchair   []Steady  [x]Bed bound  []History of Lower Extremity Amputation  []Unknown, did not assess in the emergency department   How does patient take pills?  [x]Whole with Water  []Crushed in applesauce  []Crushed in pudding  []Other  []Unknown no oral medications were given in the ED  Is patient alert?   [x]Alert  []Drowsy but responds to voice  []Doesn't respond to voice but responds to painful stimuli  []Unresponsive  Is patient oriented?   [x]To person  [x]To place  [x]To time  [x]To situation  []Confused  []Agitated  [x]Follows commands  If patient is disoriented or from a Skill Nursing Facility has family been notified of admission?   []Yes   [x]No  Patient belongings?   []Cell phone  []Wallet   []Dentures  []Clothing  Any specific patient or family belongings/needs/dynamics?   Family went home and they took his phone with them.   Miscellaneous comments/pending orders?  All ED order complete     If there are any additional questions please reach out to the Emergency Department.           Tom Mccann RN  11/04/24 0100

## 2024-11-05 VITALS
WEIGHT: 151.25 LBS | BODY MASS INDEX: 21.65 KG/M2 | HEIGHT: 70 IN | OXYGEN SATURATION: 96 % | DIASTOLIC BLOOD PRESSURE: 98 MMHG | TEMPERATURE: 97.6 F | SYSTOLIC BLOOD PRESSURE: 143 MMHG | RESPIRATION RATE: 15 BRPM | HEART RATE: 91 BPM

## 2024-11-05 LAB
ALBUMIN SERPL-MCNC: 4.2 G/DL (ref 3.4–5)
ANION GAP SERPL CALCULATED.3IONS-SCNC: 12 MMOL/L (ref 3–16)
BASOPHILS # BLD: 0 K/UL (ref 0–0.2)
BASOPHILS NFR BLD: 0.9 %
BUN SERPL-MCNC: 17 MG/DL (ref 7–20)
CALCIUM SERPL-MCNC: 9.5 MG/DL (ref 8.3–10.6)
CHLORIDE SERPL-SCNC: 104 MMOL/L (ref 99–110)
CO2 SERPL-SCNC: 23 MMOL/L (ref 21–32)
CREAT SERPL-MCNC: 1.1 MG/DL (ref 0.9–1.3)
DEPRECATED RDW RBC AUTO: 14.2 % (ref 12.4–15.4)
EOSINOPHIL # BLD: 0.1 K/UL (ref 0–0.6)
EOSINOPHIL NFR BLD: 1.4 %
GFR SERPLBLD CREATININE-BSD FMLA CKD-EPI: 80 ML/MIN/{1.73_M2}
GLUCOSE SERPL-MCNC: 108 MG/DL (ref 70–99)
HCT VFR BLD AUTO: 45.6 % (ref 40.5–52.5)
HGB BLD-MCNC: 15.9 G/DL (ref 13.5–17.5)
LACOSAMIDE SERPL-MCNC: 15.5 UG/ML (ref 1–10)
LYMPHOCYTES # BLD: 0.6 K/UL (ref 1–5.1)
LYMPHOCYTES NFR BLD: 14.8 %
MAGNESIUM SERPL-MCNC: 2.3 MG/DL (ref 1.8–2.4)
MCH RBC QN AUTO: 31.8 PG (ref 26–34)
MCHC RBC AUTO-ENTMCNC: 34.9 G/DL (ref 31–36)
MCV RBC AUTO: 91.1 FL (ref 80–100)
MONOCYTES # BLD: 0.5 K/UL (ref 0–1.3)
MONOCYTES NFR BLD: 11.6 %
NEUTROPHILS # BLD: 2.8 K/UL (ref 1.7–7.7)
NEUTROPHILS NFR BLD: 71.3 %
PHOSPHATE SERPL-MCNC: 3.8 MG/DL (ref 2.5–4.9)
PLATELET # BLD AUTO: 195 K/UL (ref 135–450)
PMV BLD AUTO: 6.8 FL (ref 5–10.5)
POTASSIUM SERPL-SCNC: 3.7 MMOL/L (ref 3.5–5.1)
RBC # BLD AUTO: 5.01 M/UL (ref 4.2–5.9)
SODIUM SERPL-SCNC: 139 MMOL/L (ref 136–145)
WBC # BLD AUTO: 4 K/UL (ref 4–11)

## 2024-11-05 PROCEDURE — 85025 COMPLETE CBC W/AUTO DIFF WBC: CPT

## 2024-11-05 PROCEDURE — 6370000000 HC RX 637 (ALT 250 FOR IP)

## 2024-11-05 PROCEDURE — 97161 PT EVAL LOW COMPLEX 20 MIN: CPT

## 2024-11-05 PROCEDURE — 97166 OT EVAL MOD COMPLEX 45 MIN: CPT

## 2024-11-05 PROCEDURE — 97116 GAIT TRAINING THERAPY: CPT

## 2024-11-05 PROCEDURE — 80069 RENAL FUNCTION PANEL: CPT

## 2024-11-05 PROCEDURE — 6360000002 HC RX W HCPCS

## 2024-11-05 PROCEDURE — 97530 THERAPEUTIC ACTIVITIES: CPT

## 2024-11-05 PROCEDURE — APPNB30 APP NON BILLABLE TIME 0-30 MINS: Performed by: NURSE PRACTITIONER

## 2024-11-05 PROCEDURE — 83735 ASSAY OF MAGNESIUM: CPT

## 2024-11-05 PROCEDURE — 36415 COLL VENOUS BLD VENIPUNCTURE: CPT

## 2024-11-05 PROCEDURE — 2580000003 HC RX 258

## 2024-11-05 RX ADMIN — METHOCARBAMOL 500 MG: 500 TABLET ORAL at 12:54

## 2024-11-05 RX ADMIN — SODIUM CHLORIDE, PRESERVATIVE FREE 10 ML: 5 INJECTION INTRAVENOUS at 08:14

## 2024-11-05 RX ADMIN — LEVETIRACETAM 2000 MG: 750 TABLET, FILM COATED ORAL at 08:11

## 2024-11-05 RX ADMIN — ENOXAPARIN SODIUM 40 MG: 100 INJECTION SUBCUTANEOUS at 08:10

## 2024-11-05 RX ADMIN — LACOSAMIDE 200 MG: 50 TABLET, FILM COATED ORAL at 08:25

## 2024-11-05 RX ADMIN — METHOCARBAMOL 500 MG: 500 TABLET ORAL at 08:11

## 2024-11-05 NOTE — PLAN OF CARE
Problem: Safety - Adult  Goal: Free from fall injury  11/5/2024 0055 by Mikaela Arauz, RN  Outcome: Progressing  Flowsheets (Taken 11/4/2024 2000)  Free From Fall Injury: Instruct family/caregiver on patient safety  11/4/2024 1444 by Fartun Zayas, RN  Outcome: Progressing  Flowsheets (Taken 11/4/2024 1442)  Free From Fall Injury: Instruct family/caregiver on patient safety  Note: Fall precautions in place. Bed is in lowest position, wheels locked, bed alarm on, non skid socks on. Call light and bedside table within reach. Pt calls out appropriately. Pt is up x1 sba. Will continue to assess and monitor.    All fall precautions in place. Bed locked and in lowest position with alarm on. Overbed table and personal belonings within reach. Call light within reach and patient instructed to use call light for assistance. Non-skid socks on.    Problem: Skin/Tissue Integrity  Goal: Absence of new skin breakdown  Description: 1.  Monitor for areas of redness and/or skin breakdown  2.  Assess vascular access sites hourly  3.  Every 4-6 hours minimum:  Change oxygen saturation probe site  4.  Every 4-6 hours:  If on nasal continuous positive airway pressure, respiratory therapy assess nares and determine need for appliance change or resting period.  11/5/2024 0055 by Mikaela Arauz, RN  Outcome: Progressing  11/4/2024 1444 by Fartun Zayas, RN  Outcome: Progressing  Note: Turns and pillow support utilized to offload weight. No new skin breakdown noted.  Skin assessed this shift. Josselin care completed as necessary. Patient alternating positions to reduce pressure and prevent skin injury q2 and as needed.

## 2024-11-05 NOTE — PLAN OF CARE
Reviewed MRI, discussed case w/ primary team  No further neurologic work up planned for now.  Maintain home dose of AEDs  F/u as outpatient w/ neurology     Camille Castellanos NP  Neuroloyg

## 2024-11-05 NOTE — DISCHARGE INSTRUCTIONS
- You were seen in the hospital for seizure.    - Please continue your seizure medications.    - Please follow up with your PCP in one week.    - Please make an appointment with neurology Dr. Ornelas in one week.     - Do not drive, and discuss it with neurologist when you can start driving.

## 2024-11-05 NOTE — PLAN OF CARE
Problem: Discharge Planning  Goal: Discharge to home or other facility with appropriate resources  Outcome: Progressing  Flowsheets (Taken 11/5/2024 0759 by Tg Nguyen, RN)      Problem: Safety - Adult  Goal: Free from fall injury  11/5/2024 1431 by Renetta Khan, RN  Outcome: Progressing  All fall precautions in place. Bed locked and in lowest position with alarm on. Overbed table and personal belonings within reach. Call light within reach and patient instructed to use call light for assistance. Non-skid socks on.      Problem: Skin/Tissue Integrity  Goal: Absence of new skin breakdown  Description: 1.  Monitor for areas of redness and/or skin breakdown  2.  Assess vascular access sites hourly  3.  Every 4-6 hours minimum:  Change oxygen saturation probe site  4.  Every 4-6 hours:  If on nasal continuous positive airway pressure, respiratory therapy assess nares and determine need for appliance change or resting period.  11/5/2024 1431 by Renetta Khan, RN  Outcome: Progressing      Problem: Pain  Goal: Verbalizes/displays adequate comfort level or baseline comfort level  Outcome: Progressing  Patient denies any pain

## 2024-11-05 NOTE — DISCHARGE SUMMARY
INTERNAL MEDICINE DEPARTMENT AT THE [facility]  DISCHARGE SUMMARY    Patient ID: Erwin Doss                                             Discharge Date: 11/5/2024   Patient's PCP: Javier Chavez MD                                          Discharge Physician: No att. providers found  Admit Date: 11/3/2024   Admitting Physician: Deanna Rojas MD    PROBLEMS DURING HOSPITALIZATION:  Present on Admission:   Encephalopathy acute      HPI:  Mr. Erwin Doss is a 54 y.o. male with a MHx significant for high-grade glioma s/p R craniotomy with resection (11/2023) with recurrence requiring resection and gamma tile placement (08/22/2024), HTN, seizure disorder, HTN who presents with reports of seizures. Patient states he had one of his focal seizures on the day of presentation on 10/4, which he describes as facial tics like right sided mouth and eye twitching. He denies any new headaches, fever, chills, shortness of breath, cough, dysuria, hematuria, polyuria. This is notably his 4th presentation to the ED for the same issue since 09/16. The first time he left AMA before being seen by neurology or neurosurgery. Second time he declined admission. Third time his case was discussed with on call NSGY and the neurology team who recommended outpatient follow up and an Ativan taper. Spouse says he's been acting weird since 10/31, speech has been slurred. Told his wife that he thought he was going to have a seizure but she does not report seeing any of his facial tics. Has been taking both his Keppra and his Vimpat, then was being tapered off Ativan with the last dose being on 10/30. After patient has seizures he does usually have left-sided weakness and dysarthria. Doesn't report any other symptoms. Patient lives at home with his wife and daughter. Reports former alcohol use but nothing in the past 6 months or so. Currently uses tobacco, dip, about one can per day. Denies any current or former illicit substance use

## 2024-11-07 NOTE — PROGRESS NOTES
Internal Medicine Progress Note    Date: 11/4/2024   Patient: Erwin Doss   [facility] Day: 0      CC: Seizures       Interval Hx   Patient seen and examined this morning while he was resting in bed with wife at bedside. Denies having any headache, vision changes, focal weakness, sensory changes. Alert and oriented x3. Intermittent waxing and wanning of orientation. Labs okay. Wbc 3.7 which is chronically low.     HPI: Mr. Erwin Doss is a 54 y.o. male with a MHx significant for high-grade glioma s/p R craniotomy with resection (11/2023) with recurrence requiring resection and gamma tile placement (08/22/2024), HTN, seizure disorder, HTN who presents with reports of seizures. Patient states he had one of his focal seizures yesterday, which he describes as facial tics like right sided mouth and eye twitching. He denies any new headaches, fever, chills, shortness of breath, cough, dysuria, hematuria, polyuria. This is notably his 4th presentation to the ED for the same issue since 09/16. The first time he left AMA before being seen by neurology or neurosurgery. Second time he declined admission. Third time his case was discussed with on call NSGY and the neurology team who recommended outpatient follow up and an Ativan taper. Spouse says he's been acting weird since 10/31, speech has been slurred. Told his wife today that he thought he was going to have a seizure but she does not report seeing any of his facial tics. Has been taking both his Keppra and his Vimpat, then was being tapered off Ativan with the last dose being on 10/30. After patient has seizures he does usually have left-sided weakness and dysarthria. Doesn't report any other symptoms.     Patient lives at home with his wife and daughter. Reports former alcohol use but nothing in the past 6 months or so. Currently uses tobacco, dip, about one can per day. Denies any current or former illicit substance use including cocaine, amphetamines, and IV 
     Internal Medicine Progress Note    Date: 11/5/2024   Patient: Erwin Doss   [facility] Day: 1      CC: Seizures       Interval Hx   Patient seen and examined this morning.  Denies any worsening headache, new vision changes, fever, focal weakness, sensory changes, tingling/numbness, no chest pain, SOB, abdominal pain, bowel and bladder changes.  MRI results came back and largely unchanged from previous 1.  UA not suggestive of UTI.  No acute overnight events.  Vital has been stable, except intermittent increase in blood pressure this morning 155/104, however within normal limits overnight.  Labs has been stable.  Having some hallucinations intermittently, seeing of the person in the room.  This morning active, alert, oriented x 3.  Intermittently disoriented to time, does not remember the year however not oriented to month and at the date.     HPI: Mr. Erwin Doss is a 54 y.o. male with a MHx significant for high-grade glioma s/p R craniotomy with resection (11/2023) with recurrence requiring resection and gamma tile placement (08/22/2024), HTN, seizure disorder, HTN who presents with reports of seizures. Patient states he had one of his focal seizures yesterday, which he describes as facial tics like right sided mouth and eye twitching. He denies any new headaches, fever, chills, shortness of breath, cough, dysuria, hematuria, polyuria. This is notably his 4th presentation to the ED for the same issue since 09/16. The first time he left AMA before being seen by neurology or neurosurgery. Second time he declined admission. Third time his case was discussed with on call NSGY and the neurology team who recommended outpatient follow up and an Ativan taper. Spouse says he's been acting weird since 10/31, speech has been slurred. Told his wife today that he thought he was going to have a seizure but she does not report seeing any of his facial tics. Has been taking both his Keppra and his Vimpat, then was being 
  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  Treatment: labs, imaging, Neuro consult, Vimpat, Keppra, medical management    Thank you,  Deisi Barr RN CDS  Options provided:  -- Encephalopathy due to brain neoplasm  -- Metabolic encephalopathy due to, please document cause  -- Encephalopathy due to other, Please specify cause.  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          
4 Eyes Skin Assessment     NAME:  Erwin Doss  YOB: 1970  MEDICAL RECORD NUMBER:  [id number]    The patient is being assessed for  Admission    I agree that at least one RN has performed a thorough Head to Toe Skin Assessment on the patient. ALL assessment sites listed below have been assessed.      Areas assessed by both nurses:    Head, Face, Ears, Shoulders, Back, Chest, Arms, Elbows, Hands, Sacrum. Buttock, Coccyx, Ischium, and Legs. Feet and Heels      Blanchable redness to bilat heels and buttocks   Does the Patient have a Wound? No noted wound(s)       Kendell Prevention initiated by RN: No  Wound Care Orders initiated by RN: No    Pressure Injury (Stage 3,4, Unstageable, DTI, NWPT, and Complex wounds) if present, place Wound referral order by RN under : No    New Ostomies, if present place, Ostomy referral order under : No     Nurse 1 eSignature: Electronically signed by Mikaela Arauz RN on 11/4/24 at 4:06 AM EST    **SHARE this note so that the co-signing nurse can place an eSignature**    Nurse 2 eSignature: {Esignature:[id number]}    
Called patient's wife, Annette Singh, at her listed number with the intention of gathering additional history. Had to leave a voicemail.    Addendum: She called back, additional history gathered, see HPI.    Eleazar Desouza, DO  Internal Medicine, PGY-2  11/04/24  2:05 AM  
No acute events overnight. VSS. Continue plan of care     
Patient is alert and oriented x2-3, and is disoriented at times. VSS on room air. Medications given per MAR, no side effects noted. Patient ambulating x1 with gait belt and walker. Voiding well via BRP.     Patient is currently off the floor for an MRI.  
Patient is alert and oriented x2. VSS on RA. Ambulating x1 with walker and gait belt. Rolling walker delivered to bedside. Voiding via BRP. 1 bowel movement this shift. Denies pain. IV removed without complications. Discharge instructions reviewed with pt. IV removed without complications. Wife at bedside to take patient home.  
Physical Therapy  Facility/Department: [facility] ORTHO/NEURO  Physical Therapy Initial Assessment /Treatment    Name: Erwin Doss  : 1970  MRN: [id number]  Date of Service: 2024    Discharge Recommendations:  24 hour supervision or assist   PT Equipment Recommendations  Equipment Needed: Yes  Mobility Devices: Walker  Walker: Rolling      Patient Diagnosis(es): The encounter diagnosis was Altered mental status, unspecified altered mental status type.  Past Medical History:  has a past medical history of Glioblastoma (HCC), History of snuff use, Hx of radiation therapy, Hx of smoking, Hypertension, Marijuana use, Medical marijuana use, Seizure (HCC), Use of cane as ambulatory aid, Vapes non-nicotine containing substance, and Wears glasses.  Past Surgical History:  has a past surgical history that includes craniotomy (Right, 2023) and craniotomy (Right, 2024).    Assessment  Body Structures, Functions, Activity Limitations Requiring Skilled Therapeutic Intervention: Decreased functional mobility ;Decreased balance  Assessment: Pt is 54 y.o. male with h/o glioblastoma s/p craniotomy 2023 and Aug 2024 admit with acute encephalopathy.  Pt is from home with wife who provides 24hr supervision.  Pt is typically indep with transfers and amb.  Today, pt requires use of RW for safe amb.  Requires min cues initially for use of walker.  Anticipate good improvement in amb with further practice.  Wife states she is able to provide 24hr assist at d/c.  Rec RW.  Will f/u if pt does not d/c home today.  Treatment Diagnosis: impaired balance and gait  Therapy Prognosis: Good  Decision Making: Low Complexity  Requires PT Follow-Up: Yes    Plan  Physical Therapy Plan  General Plan:  (2-5)  Current Treatment Recommendations: Balance training, Functional mobility training, Gait training, Transfer training, Patient/Caregiver education & training, Therapeutic activities, Equipment evaluation, education, & 
Pt wants to leave AMA but family member is refusing to take him home at this time.   
Pt wants to leave AMA with wife. Educated him on the importance of staying for MRI. Neuro NP and attending resident have been notified.   
appears to have improved when compared to the CT exams from September and October.  3. Large surgical defect with adjacent nodular areas of enhancement posteriorly which appear to be slightly more pronounced on the prior. Small foci of enhancement along the right frontal white matter adjacent to the ventricle appears similar to prior exam.  4. Diffuse abnormal FLAIR signal appears very similar to the prior examination with similar involvement of the corpus callosum.       Labs:  Recent Labs     11/05/24 0623   WBC 4.0   HGB 15.9   HCT 45.6          Recent Labs     11/05/24 0623      K 3.7      CO2 23   BUN 17   CREATININE 1.1   GLUCOSE 108*   CALCIUM 9.5   PHOS 3.8   MG 2.30       No results for input(s): \"PROTIME\", \"INR\", \"APTT\" in the last 72 hours.    Patient Active Problem List    Diagnosis Date Noted    Encephalopathy acute 11/04/2024    GBM (glioblastoma multiforme) ([facility]) 08/22/2024    Breakthrough seizure ([facility]) 04/02/2024    Hematoma of scalp 12/11/2023    Focal seizures ([facility]) 12/10/2023    S/P craniotomy 12/01/2023    Brain mass 11/28/2023    Nonintractable generalized idiopathic epilepsy without status epilepticus ([facility]) 11/28/2023    Seizures ([facility]) 10/29/2023    Seizure ([facility]) 09/08/2023    Encephalitis 09/08/2023    Abnormal brain MRI 09/08/2023    Primary hypertension 09/08/2023    Smoking 09/08/2023    Cannabis dependence ([facility]) 09/08/2023    Alcohol use 09/08/2023    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 male w/ history of GBM s/p resection x 2 who presented with L hand numbness and some reports of dysarthria, different from his typical seizure. MRI shows expected post-op changes with no acute abnormalities     Plan:  Neurologic exams frequency: Q4H  For change in exam MUST contact neurosurgery team along with critical care or primary team  Seizure management per Neurology team  Pain: Managed by medical team  Advance diet 
drying)?: A Little  How much help is needed for toileting (which includes using toilet, bedpan, or urinal)?: A Little  How much help is needed for putting on and taking off regular upper body clothing?: None  How much help is needed for taking care of personal grooming?: A Little  How much help for eating meals?: None  AM-[facility] Inpatient Daily Activity Raw Score: 20  AM-[facility] Inpatient ADL T-Scale Score : 42.03  ADL Inpatient CMS 0-100% Score: 38.32  ADL Inpatient CMS G-Code Modifier : CJ    Tinneti Score       Goals  Short Term Goals  Time Frame for Short Term Goals: by dc  Short Term Goal 1: Pt will complete toileting with spvn  Short Term Goal 2: Pt will complete LB dressing spvn  Short Term Goal 3: Pt will tolerate 5 minutes in stance for grooming task  Patient Goals   Patient goals : to go home      Therapy Time   Individual Concurrent Group Co-treatment   Time In 1120         Time Out 1150         Minutes 30         Timed Code Treatment Minutes: 15 mins + 15 min eval=30 Minutes       Di Yoder, OT

## (undated) DEVICE — ANES EXTENSION SET 90IN-LF: Brand: MEDLINE INDUSTRIES, INC.

## (undated) DEVICE — 3M™ TEGADERM™ TRANSPARENT FILM DRESSING FRAME STYLE, 1624W, 2-3/8 IN X 2-3/4 IN (6 CM X 7 CM), 100/CT 4CT/CASE: Brand: 3M™ TEGADERM™

## (undated) DEVICE — SEALANT SURG 13 YR DURA AUTOSPRAY ADHERUS NUS106] SURGICAL ONE]

## (undated) DEVICE — BLADE ES ELASTOMERIC COAT INSUL DURABLE BEND UPTO 90DEG

## (undated) DEVICE — COVER XR CASS W21XL40IN UNIV ADH MICROSHIELD

## (undated) DEVICE — DRESSING FOAM DISK DIA1IN H 7MM HYDRPHLC CHG IMPREG IN SL

## (undated) DEVICE — SUTURE VICRYL + SZ 2-0 L18IN ABSRB VLT L26MM SH 1/2 CIR VCP775D

## (undated) DEVICE — APPLICATOR MEDICATED 10.5 CC SOLUTION HI LT ORNG CHLORAPREP

## (undated) DEVICE — SPONGE,NEURO,.75"X.75",XR,STRL,LF,10/PK: Brand: MEDLINE

## (undated) DEVICE — SEALANT SURG 13 YR DURA AUTOSPRAY ADHERUS

## (undated) DEVICE — AGENT HEMOSTATIC SURGIFLOW MATRIX KIT W/THROMBIN

## (undated) DEVICE — GLOVE SURG SZ 8 L11.6IN THK9.8MIL STRW LTX POLYMER BEAD CUF

## (undated) DEVICE — STAPLER SKIN H3.9MM WIRE DIA0.58MM CRWN 6.9MM 35 STPL ROT

## (undated) DEVICE — CODMAN® SURGICAL PATTIES 1/4" X 1/4" (0.64CM X 0.64CM): Brand: CODMAN®

## (undated) DEVICE — SPONGE,LAP,18"X18",DLX,XR,ST,5/PK,40/PK: Brand: MEDLINE

## (undated) DEVICE — TOOL MR8-9BA50 MR8 9CM BALL 5MM: Brand: MIDAS REX MR8

## (undated) DEVICE — BLADE CLIPPER SURG SENSICLIP

## (undated) DEVICE — SOLUTION IV 500ML 0.9% SOD CHL PH 5 INJ USP VIAFLX PLAS

## (undated) DEVICE — SOLUTION IV 1000ML 0.9% SOD CHL

## (undated) DEVICE — TOOL MR8-F2/7TA23 MR8 F2/7CM TAPER 2.3MM: Brand: MIDAS REX MR8

## (undated) DEVICE — SUTURE VICRYL + SZ 2-0 L18IN ABSRB UD CT1 L36MM 1/2 CIR VCP839D

## (undated) DEVICE — BANDAGE,GAUZE,BULKEE II,4.5"X4.1YD,STRL: Brand: MEDLINE

## (undated) DEVICE — GARMENT,MEDLINE,DVT,INT,CALF,MED, GEN2: Brand: MEDLINE

## (undated) DEVICE — SURGIFOAM SPNG SZ 100

## (undated) DEVICE — COTTON BALLS, DOUBLE STRUNG: Brand: DEROYAL

## (undated) DEVICE — GOWN,SIRUS,POLYRNF,BRTHSLV,LG,30/CS: Brand: MEDLINE

## (undated) DEVICE — 4-PORT MANIFOLD: Brand: NEPTUNE 2

## (undated) DEVICE — SPHERES FOR BRAINLAB

## (undated) DEVICE — SUTURE NRLN SZ 4-0 L18IN NONABSORBABLE BLK L13MM TF 1/2 CIR C584D

## (undated) DEVICE — SPONGE,NEURO,0.5"X0.5",XR,STRL,10/PK: Brand: MEDLINE

## (undated) DEVICE — DRESSING GERM DIA1IN CNTR H DIA7MM BLU CHG ANTIMIC PROTCT

## (undated) DEVICE — HOOK RETRACT STERIL 12MM S STL BLNT E STAY LONE STAR

## (undated) DEVICE — TOWEL,STOP FLAG GOLD N-W: Brand: MEDLINE

## (undated) DEVICE — ADAPTER LAB INTMED LUER 17GA

## (undated) DEVICE — COVER LT HNDL CAM BLU DISP W/ SURG CTRL

## (undated) DEVICE — NEPTUNE E-SEP SMOKE EVACUATION PENCIL, COATED, 70MM BLADE, PUSH BUTTON SWITCH: Brand: NEPTUNE E-SEP

## (undated) DEVICE — SHEET,DRAPE,53X77,STERILE: Brand: MEDLINE

## (undated) DEVICE — IRRIGATION SUCTION CASSETTE: Brand: SONOPET IQ

## (undated) DEVICE — AGENT HEMOSTATIC SURG ORIGINAL ABS 2X14IN LOOSE KNIT 12/CA

## (undated) DEVICE — SUTURE VCRL + SZ 2-0 L18IN ABSRB UD CT1 L36MM 1/2 CIR VCP839D

## (undated) DEVICE — SSC BONE WAX: Brand: SSC BONE WAX

## (undated) DEVICE — 12CM IQ BARRACUDA: Brand: SONOPET IQ

## (undated) DEVICE — DRAIN SURG 10FR END PERF 1/8IN SIL RND SMOOTH HEAT POLISHED

## (undated) DEVICE — CRANI: Brand: MEDLINE INDUSTRIES, INC.

## (undated) DEVICE — NEURO SPONGES: Brand: DEROYAL

## (undated) DEVICE — CODMAN® SURGICAL PATTIES 1/2" X 1/2" (1.27CM X 1.27CM): Brand: CODMAN®

## (undated) DEVICE — TUBING, SUCTION, 1/4" X 12', STRAIGHT: Brand: MEDLINE

## (undated) DEVICE — Device

## (undated) DEVICE — AGENT HEMSTAT W2XL4IN OXIDIZED REGENERATED CELOS ABSRB SFT

## (undated) DEVICE — SUTURE VICRYL SZ 2-0 L18IN ABSRB UD CT-1 L36MM 1/2 CIR J839D

## (undated) DEVICE — SUTURE VCRL SZ 2-0 L18IN ABSRB UD CT-1 L36MM 1/2 CIR J839D

## (undated) DEVICE — TUBING

## (undated) DEVICE — CODMAN® SURGICAL PATTIES 3/4" X 3/4" (1.91CM X 1.91CM): Brand: CODMAN®

## (undated) DEVICE — CODMAN® RANEY SCALP CLIPS 20 UNITS OF 10 CLIPS: Brand: CODMAN®

## (undated) DEVICE — TRAP FLUID